# Patient Record
Sex: MALE | Race: WHITE | NOT HISPANIC OR LATINO | Employment: OTHER | URBAN - METROPOLITAN AREA
[De-identification: names, ages, dates, MRNs, and addresses within clinical notes are randomized per-mention and may not be internally consistent; named-entity substitution may affect disease eponyms.]

---

## 2018-01-15 ENCOUNTER — OFFICE VISIT (OUTPATIENT)
Dept: ORTHOPEDICS | Facility: CLINIC | Age: 63
End: 2018-01-15
Payer: COMMERCIAL

## 2018-01-15 VITALS — RESPIRATION RATE: 16 BRPM | DIASTOLIC BLOOD PRESSURE: 89 MMHG | SYSTOLIC BLOOD PRESSURE: 142 MMHG | HEART RATE: 83 BPM

## 2018-01-15 DIAGNOSIS — M25.531 ARTHRALGIA OF RIGHT FOREARM: Primary | ICD-10-CM

## 2018-01-15 NOTE — LETTER
1/15/2018      RE: Aníbal Goldstein  [address] [facility] [address]        Subjective:   Aníbal Goldstein is a 62 year old male who is c/o right wrist pain and numbness into whole hand. Overuse 3-4 weeks ago. He drives trucks for a living. Hx of carpal tunnel release in left wrist about 3 years ago. Has tried using braces but this is not fully effective. Sachin lifts heavy objects as part of his job and he has had difficulty lifting large bulky object due to the R hand symptoms.  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  He  has a past surgical history that includes hernia repair, inguinal rt/lt; KNEE SCOPE,SINGLE MENISECTOMY (2/9/2009); and rotator cuff repair rt/lt (02/18/10).  His family history includes DIABETES in his father.  He reports that he has never smoked. He has never used smokeless tobacco. He reports that he drinks about 1.0 oz of alcohol per week  He reports that he does not use illicit drugs.      ALLERGIES: He is allergic to no known allergies.    CURRENT MEDICATIONS: He currently has no medications in their medication list.     REVIEW OF SYSTEMS: 3 point review of systems is negative except as noted above.     Exam:   /89 (BP Location: Right arm, Patient Position: Sitting, Cuff Size: Adult Large)  Pulse 83  Resp 16           CONSTITUTIONAL: healthy, alert and no distress  HEAD: Normocephalic. No masses,  lesions, tenderness or abnormalities  SKIN: no suspicious lesions or rashes  GAIT: normal  NEUROLOGIC: Non-focal  PSYCHIATRIC: affect normal/bright and mentation appears normal.    MUSCULOSKELETAL:   R wrist, no wasting  AROM is full, perhaps subjective decrease in  strength  + tinnels and + phalens with numbness to the volar thumb and index finger    No paracervical tenderess AROM full with neg spurlings.    L elbow no swelling AROM without deficit  Tender lateral epicondyle and worse with ext.     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    Driss Chu MD CAQ

## 2018-01-15 NOTE — MR AVS SNAPSHOT
After Visit Summary   1/15/2018    Aníbal Goldstein    MRN: [id number]           Patient Information     Date Of Birth          1955        Visit Information        Provider Department      1/15/2018 6:15 PM Driss Chu MD [facility]        Today's Diagnoses     Arthralgia of right forearm    -  1       Follow-ups after your visit        Additional Services     ORTHOPEDICS ADULT REFERRAL       Your provider has referred you to: ortho hand to discuss median neuropathy post release  Please be aware that coverage of these services is subject to the terms and limitations of your health insurance plan.  Call member services at your health plan with any benefit or coverage questions.      Please bring the following to your appointment:    >>   Any x-rays, CTs or MRIs which have been performed.  Contact the facility where they were done to arrange for  prior to your scheduled appointment.    >>   List of current medications   >>   This referral request   >>   Any documents/labs given to you for this referral                  Future tests that were ordered for you today     Open Future Orders        Priority Expected Expires Ordered    EMG ([facility]) Routine  2/14/2018 1/15/2018            Who to contact     Please call your clinic at [phone] to:    Ask questions about your health    Make or cancel appointments    Discuss your medicines    Learn about your test results    Speak to your doctor   If you have compliments or concerns about an experience at your clinic, or if you wish to file a complaint, please contact [facility] Patient Relations at [phone] or email us at [email or web address]         Additional Information About Your Visit        MyChart Information     [employer] is an electronic gateway that provides easy, online access to your medical records. With [employer], you can request a clinic appointment, read your  test results, renew a prescription or communicate with your care team.     To sign up for [employer] visit the website at [email or web address]   You will be asked to enter the access code listed below, as well as some personal information. Please follow the directions to create your username and password.     Your access code is: [email or web address]  Expires: 2018  6:30 AM     Your access code will  in 90 days. If you need help or a new code, please contact your [facility] or call [phone] for assistance.        Care EveryWhere ID     This is your Care EveryWhere ID. This could be used by other organizations to access your [place] medical records  [id number]        Your Vitals Were     Pulse Respirations                83 16           Blood Pressure from Last 3 Encounters:   01/15/18 142/89   13 118/62   13 127/78    Weight from Last 3 Encounters:   13 196 lb (88.9 kg)   12 207 lb (93.9 kg)   01/10/12 207 lb (93.9 kg)              We Performed the Following     ORTHOPEDICS ADULT REFERRAL        Primary Care Provider Office Phone # Fax #    Morgan Omid Fierro [phone]        [facility] [address] [place] [id number]        Equal Access to Services     [facility]: Hadii aad ku hadasho Soomaali, waaxda luqadaha, qaybta kaalmada adeegyada, waxay idiin hayaan paul malik . So [facility] [phone].    ATENCIÓN: Si habla español, tiene a santos disposición servicios gratuitos de asistencia lingüística. Llame al [phone].    We comply with applicable federal civil rights laws and [place] laws. We do not discriminate on the basis of race, color, national origin, age, disability, sex, sexual orientation, or gender identity.            Thank you!     Thank you for choosing [facility]  for your care. Our goal is always to provide you with excellent care. Hearing back from our patients is one way we can  continue to improve our services. Please take a few minutes to complete the written survey that you may receive in the mail after your visit with us. Thank you!             Your Updated Medication List - Protect others around you: Learn how to safely use, store and throw away your medicines at www.disposemymeds.org.      Notice  As of 1/15/2018  7:09 PM    You have not been prescribed any medications.

## 2018-01-16 NOTE — PROGRESS NOTES
Subjective:   Aníbal Goldstein is a 62 year old male who is c/o right wrist pain and numbness into whole hand. Overuse 3-4 weeks ago. He drives trucks for a living. Hx of carpal tunnel release in left wrist about 3 years ago. Has tried using braces but this is not fully effective. Sachin lifts heavy objects as part of his job and he has had difficulty lifting large bulky object due to the R hand symptoms.  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  He  has a past surgical history that includes hernia repair, inguinal rt/lt; KNEE SCOPE,SINGLE MENISECTOMY (2/9/2009); and rotator cuff repair rt/lt (02/18/10).  His family history includes DIABETES in his father.  He reports that he has never smoked. He has never used smokeless tobacco. He reports that he drinks about 1.0 oz of alcohol per week  He reports that he does not use illicit drugs.      ALLERGIES: He is allergic to no known allergies.    CURRENT MEDICATIONS: He currently has no medications in their medication list.     REVIEW OF SYSTEMS: 3 point review of systems is negative except as noted above.     Exam:   /89 (BP Location: Right arm, Patient Position: Sitting, Cuff Size: Adult Large)  Pulse 83  Resp 16           CONSTITUTIONAL: healthy, alert and no distress  HEAD: Normocephalic. No masses, lesions, tenderness or abnormalities  SKIN: no suspicious lesions or rashes  GAIT:  normal  NEUROLOGIC: Non-focal  PSYCHIATRIC: affect normal/bright and mentation appears normal.    MUSCULOSKELETAL:   R wrist, no wasting  AROM is full, perhaps subjective decrease in  strength  + tinnels and + phalens with numbness to the volar thumb and index finger    No paracervical tenderess AROM full with neg spurlings.    L elbow no swelling AROM without deficit  Tender lateral epicondyle and worse with ext.     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    Driss Chu MD CAQ

## 2018-01-17 ENCOUNTER — TELEPHONE (OUTPATIENT)
Dept: ORTHOPEDICS | Facility: CLINIC | Age: 63
End: 2018-01-17

## 2018-01-17 NOTE — TELEPHONE ENCOUNTER
I was calling to let patient know there was a miscommunication in instructions I gave him the night prior. I told him previously to schedule with a hand surgeon here, however Dr. Chu recommends that he sees Dr. Delgado at the [facility] since it is closer to him. The pt verbalized understanding.

## 2018-01-22 ENCOUNTER — PRE VISIT (OUTPATIENT)
Dept: ORTHOPEDICS | Facility: CLINIC | Age: 63
End: 2018-01-22

## 2018-01-22 DIAGNOSIS — M79.641 RIGHT HAND PAIN: Primary | ICD-10-CM

## 2018-01-22 NOTE — TELEPHONE ENCOUNTER
Pt reports being seen for : Right wrist pain and numbness.  1. Do you have recent xrays (if not seen in EPIC)? No -  Patient informed that they will have x-rays done before appointment and to arrive at least 30 minutes before MD appointment. Xrays ordered per standing orders.  2. Do you have any MRI's (if not seen in EPIC)?No.   3. Have you had surgery in the past for the same issue?Had left side done in 2013  4. Are you being referred by another provider? Yes: Dr. Chu  If yes-Records in Epic.  5. Is this work comp or MVA related? No  No EMG.    Left message for patient to call back for previsit  Melisa Thornton RN

## 2018-01-25 ENCOUNTER — RADIANT APPOINTMENT (OUTPATIENT)
Dept: GENERAL RADIOLOGY | Facility: OTHER | Age: 63
End: 2018-01-25
Attending: ORTHOPAEDIC SURGERY
Payer: COMMERCIAL

## 2018-01-25 DIAGNOSIS — M79.641 RIGHT HAND PAIN: ICD-10-CM

## 2018-01-25 PROCEDURE — 73130 X-RAY EXAM OF HAND: CPT | Mod: RT

## 2018-02-13 NOTE — TELEPHONE ENCOUNTER
Pt had left side done about 3 years ago with [facility]. No complications. Left elbow pain is more like tennis elbow, no numbness or tingling. Only wanting to be seen for right hand/wrist.    Tiago Cline RN

## 2018-02-19 ENCOUNTER — TELEPHONE (OUTPATIENT)
Dept: ORTHOPEDICS | Facility: CLINIC | Age: 63
End: 2018-02-19

## 2018-02-19 ENCOUNTER — OFFICE VISIT (OUTPATIENT)
Dept: ORTHOPEDICS | Facility: CLINIC | Age: 63
End: 2018-02-19
Attending: FAMILY MEDICINE
Payer: COMMERCIAL

## 2018-02-19 VITALS
BODY MASS INDEX: 33.27 KG/M2 | SYSTOLIC BLOOD PRESSURE: 132 MMHG | HEIGHT: 67 IN | WEIGHT: 212 LBS | DIASTOLIC BLOOD PRESSURE: 74 MMHG | OXYGEN SATURATION: 97 % | HEART RATE: 83 BPM

## 2018-02-19 DIAGNOSIS — G56.01 CARPAL TUNNEL SYNDROME OF RIGHT WRIST: Primary | ICD-10-CM

## 2018-02-19 PROCEDURE — 99214 OFFICE O/P EST MOD 30 MIN: CPT | Performed by: ORTHOPAEDIC SURGERY

## 2018-02-19 ASSESSMENT — PAIN SCALES - GENERAL: PAINLEVEL: MILD PAIN (2)

## 2018-02-19 NOTE — MR AVS SNAPSHOT
After Visit Summary   2/19/2018    Aníbal Goldstein    MRN: [id number]           Patient Information     Date Of Birth          1955        Visit Information        Provider Department      2/19/2018 3:30 PM Britney Delgado MD [facility]        Care Instructions      [facility]  [address] [place] [address]  Phone [phone]  Fax [phone]    SURGICAL INFORMATION & INSTRUCTIONS  Dr. Britney Delgado  Name of Surgery: Right open carpal tunnel release    Date of Surgery:     A surgery scheduler will contact you within 1 week of your office visit with the surgeon.  If you don't receive a phone call, please call [phone].    Arrival Time:     Time of Surgery:      Please arrive early so that we can prepare you for surgery, if you arrive later than your scheduled arrival time your surgery may be cancelled.  Please note that scheduled times may change.      Location of Surgery:     ? [address] [address]  2nd floor check-in  Phone [phone]  Fax [phone]  [email or web address]    Prior to surgery    ? Call your insurance company  Ask if you need pre-approval for your surgery.  If pre-approval is needed, please call our surgery scheduler for assistance with the pre-approval process.   If you do not have insurance, please let us know.     ? Pre-Op Phone Call  You will receive a pre-op phone call 1-3 days before surgery to review your eating and drinking restrictions, review medications, and confirm surgery times.      ? 7-10 days BEFORE surgery  ? Stop taking aspirin, Plavix or aspirin products 10 days before surgery or as directed by your doctor.  ? Stop taking non-steroidal anti-inflammatory medications (naproxen/Aleve, ibuprofen/Advil/Motrin, celecoxib/Celebrex, meloxicam/Mobic) 1 week before surgery or as directed by your surgeon.  This will reduce the risk of bleeding during  surgery.  ? Stop taking fish oil (Omega-3-fatty acid) 1 week before surgery.  ? It is OK to take acetaminophen (Tylenol) up until 2 hours prior to surgery.  ? Take prescription medications as directed by your doctor. Discuss which medications to take or hold prior to your surgery, with your primary care doctor.  ? If you have diabetes, ask your primary care doctor or endocrinologist how you should take your medication(s).    ? 24 hours BEFORE surgery  Stop drinking alcohol (beer, wine, liquor) at least 24-hours before and after your surgery.     ? Evening BEFORE surgery  - Take a shower - to help wash away bacteria (germs) from your skin.  It s normal to have bacteria on your skin and skin protects us from these germs.  When you have surgery, we cut the skin.  Sometimes germs get into the cuts and cause infection (illness caused by germs).  By following the showering instructions and using the special soap, you will lower the number of germs on your skin.  This decreases your chance of an infection.    - Buy or get 8 ounces of antiseptic surgical soap called 4% CHG.  Common brands of this soap are Hibiclens and Exidine.    - You can find it this soap at your local pharmacy, clinic or retail store.  If you have trouble finding it, ask your pharmacist to help you find the right substitute.    - Do not shave within 12 inches of your incision (surgical cut) area for at least 3 days before surgery.  Shaving can make small cuts in the skin. This puts you at a higher risk of infection.    Items you will need for each shower:   - Newly washed towel  - 4 ounces of one of the recommended soaps    Follow these instructions, the evening before surgery  - Wash your hair and body with your regular shampoo and soap. Make sure you rinse the shampoo and soap from your hair and body.  - Using clean hands, apply about 2 ounces of soap gently on your skin from the neck to your toes. Use on your groin area last. Do not use this soap on  your face or head. If you get any soap in your eyes, ears or mouth, rinse right away.  - Once the soap has been on your skin for at least 1 minute, rinse off completely and repeat washing with the surgical soap one more time.  - Rinse well and dry off using a clean towel.  If you feel any tingling, itching or other irritation, rinse right away. It is normal to feel some coolness on the skin after using the antiseptic soap. Your skin may feel a bit dry after the shower, but do not use any lotions, creams or moisturizers. Do not use hair spray or other products in your hair.  - Dress in freshly washed clothes or pajamas. Use fresh pillowcases and sheets on your bed.    ? Day of Surgery  - Take another shower          Follow these instructions:      - Wash your hair and body with your regular shampoo and soap. Make sure you rinse the shampoo and soap from your hair and body.  - Using clean hands, apply about 2 ounces of soap gently on your skin from the neck to your toes. Use on your groin area last. Do not use this soap on your face or head. If you get any soap in your eyes, ears or mouth, rinse right away.  - Once the soap has been on your skin for at least 1 minute, rinse off completely and repeat washing with the surgical soap one more time.  - Rinse well and dry off using a clean towel.  If you feel any tingling, itching or other irritation, rinse right away. It is normal to feel some coolness on the skin after using the antiseptic soap. Your skin may feel a bit dry after the shower, but do not use any lotions, creams or moisturizers. Do not use hair spray or other products in your hair.  - Dress in freshly washed clothes.  - Do not wear deodorant, cologne, lotion, makeup, nail polish or jewelry to surgery.    ? If there is any change in your health PRIOR to your surgery, please contact your surgeon's office.  Such as a fever, body aches, fatigue, any flu-like symptoms, rash, or any new injury to related body  part.    ? Arrange for someone to drive you home after surgery.    will need to be a responsible adult (18 years or older) that will provide transportation to and from surgery and stay in the waiting room during your surgery. You may not drive yourself or take public transportation to and from surgery.    ? Arrange for someone to stay with you for 24 hours after you go home.   This person must be a responsible adult (18 years or older).    ? Bring these items to the hospital/surgery center:   ? Insurance card(s)  ? Money for parking and co-pays, if needed  ? A list of all the medications you take, including vitamins, minerals, herbs and over the counter medications.    ? A copy of your Advance Health Care Directive, if you have one.  This tells us what treatment(s) you would or would not want, and who would make health care decisions, if you could no longer speak for yourself.    ? A case for glasses, contact lenses, hearing aids or dentures.   ? Your inhaler or CPAP machine, if you use these at home    ? Leave extra cash, jewelry and other valuables at home.       ? Other information:   Sleep Apnea: Let your nurse know if you have a history of sleep apnea, only if you are having surgery at the [facility].    When you arrive for surgery  When you get to the surgery center/hospital, you will:  - Check in. If you are under age 18, you must be with a parent or legal guardian.  - Sign consent forms, if you haven t already. These forms state that you know the risks and benefits of surgery. When you sign the forms, you give us permission to do the surgery. Do not sign them unless you understand what will happen during and after your surgery. If you have any questions about your surgery, ask to speak with your doctor before you sign the forms. If you don t understand the answers, ask again.  - Receive a copy of the Patient s Bill of Rights. If you do not receive a copy, please ask for one.  - Change  into hospital clothes. Your belongings will be placed in a bag. We will return them to you after surgery.  - Meet with the anesthesia provider. He or she will tell you what kind of anesthesia (medicine) will be used to keep you comfortable during surgery.  - Remember: it s okay to remind doctors and nurses to wash their hands before touching you.  - In most cases, your surgeon will use a marker to write his or her initials on the surgery site. This ensures that the exact site is operated on.  - For safety reasons, we will ask you the same questions many times. For example, we may ask your name and birth date over and over again.  - Friends and family can stay with you until it s time for surgery. While you re in surgery, they will be in the waiting area. Please note that cell phones are not allowed in some patient care areas.  - If you have questions about what will happen in the operating room, talk to your care team.  - You will meet with an anesthesiologist, before your surgery.  He or she may reference types of anesthesia commonly used for surgeries:   o General:  This involves the use of an IV for injection of medication and anesthesia. You are put into a sleep and have a breathing tube to assist you with breathing.  o Sedation:  You are asleep, but not so deply that you need a breathing tube.   o Local or Regional: a nerve is injected to numb the surgical area.  o Spinal: you are numbed from the waist down with medicine injected into your back.  o Femoral Nerve Block:  Anesthesia injected into the groin of leg which you are having surgery on.      After surgery  We will move you to a recovery room, where we will watch you closely. If you have any pain or discomfort, tell your nurse. He or she will try to make you comfortable.    - If you are staying overnight, we will move you to your hospital room after you are awake.  - If you are going home, we will move you to another room. Friends and family may be able  to join you. The length of time you spend in recovery depend on the type of medicine you received, your medical condition, the type of surgery you had, or your response to the anesthesia given during your procedure.  - When you are discharged from the recovery room, the nurses will review instructions with you and your caregiver.  - Please wash your hands every time you touch the wound or change bandages or dressings.  - Do not submerge the wound in water.  You may not use a bathtub or hot tub until the wound is closed. The wait time frame is generally 2-3 weeks, but any open area can be a source of incoming bacteria, so it is better to be on the safe side and avoid water submersion until your wound is fully healed.  Keep all dressings clean and dry.   - If you had surgery on your arm or leg, elevate it as much as possible to help reduce swelling.  - You may put ice on the surgical area for comfort, keeping ice on area for up to 20 minutes then off for 40 minutes.  You may do this the first few days after surgery to help reduce pain and swelling.    - Drink at least 8-10 glasses of liquid each day to help your body heal.  - Keep your lungs clear by coughing and taking deep breaths every couple hours.  This is especially important the first 48 hours after surgery.    - Notify your doctor if you have any of the following:   o Fever of 101 F or higher  o Numbness and/or tingling  o Increased pain, redness or swelling  o Drainage from wound  o Prolonged or uncontrolled bleeding  o Difficulty with movement    Follow-up Appointments, in Clinic  If you don't already have an appointment scheduled, please call to set up an appointment at [phone].      ? Nurse Only appointment (2 weeks post op)  ? Post-Op appointments with provider (6 weeks post op) as needed    Dealing with pain  A nurse will check your comfort level often during your stay. He or she will work with you to manage your pain.  It s expected that you will  have pain after surgery.  Our goal is to reduce or minimize pain by:   - Remember pain is real. There are many ways to control pain. We can help you decide what works best for you.  - Ask for pain medicine when you need it.  Don t try to  tough it out --this can make you feel worse. Always take your medicine as ordered.  - Medicine doesn t work the same for everyone. If your medicine isn t working, tell your nurse. There may be other medicines or treatments we can try.  - Medication Refills.  If you need refills on your pain medication, please call the clinic as soon as possible.  It may take 72-business hours to obtain a refill.  Refills must be picked up at check-in 2, [facility] or mailed to a pharmacy of your choice.    - It is expected that you will wean off the pain medications in a timely manner.   - Constipation is a common side effect of pain medication, decreased activity and anesthesia from surgery.  Take a stool softener as prescribed by your doctor at the time of discharge.  You may also use over the counter medications as needed.  Be sure to increase your fiber (fruits and vegetables) and your water intake.      Please call the clinic at [phone], if you experience any problems or have questions.  If you are having an emergency, always call [phone] or seek immediate evaluation at the Emergency Room.    Thank you for selecting the [facility] for your care!  ---------------------------------------------          Follow-ups after your visit        Who to contact     If you have questions or need follow up information about today's clinic visit or your schedule please contact [facility] directly at [phone].  Normal or non-critical lab and imaging results will be communicated to you by MyChart, letter or phone within 4 business days after the clinic has received the results. If you do not hear from us within 7 days, please contact the clinic  "through [address] or phone. If you have a critical or abnormal lab result, we will notify you by phone as soon as possible.  Submit refill requests through [address] or call your pharmacy and they will forward the refill request to us. Please allow 3 business days for your refill to be completed.          Additional Information About Your Visit        [address] Information     [address] is an electronic gateway that provides easy, online access to your medical records. With [address], you can request a clinic appointment, read your test results, renew a prescription or communicate with your care team.     To sign up for [address] visit the website at [email or web address]   You will be asked to enter the access code listed below, as well as some personal information. Please follow the directions to create your username and password.     Your access code is: [email or web address]  Expires: 2018  6:30 AM     Your access code will  in 90 days. If you need help or a new code, please contact your [facility] or call [phone] for assistance.        Care EveryWhere ID     This is your Care EveryWhere ID. This could be used by other organizations to access your [place] medical records  [id number]        Your Vitals Were     Pulse Height Pulse Oximetry BMI (Body Mass Index)          83 1.702 m (5' 7\") 97% 33.2 kg/m2         Blood Pressure from Last 3 Encounters:   18 132/74   01/15/18 142/89   13 118/62    Weight from Last 3 Encounters:   18 96.2 kg (212 lb)   13 88.9 kg (196 lb)   12 93.9 kg (207 lb)              Today, you had the following     No orders found for display       Primary Care Provider Office Phone # Fax #    Morgan Omid Fierro [phone]       [address] [place] [address]        Equal Access to Services     EARNEST AMES AH: Hadii jovany carsono Sorita, waaxda luqadaha, qaybta kaalmada adeegyada, isra mercado " derek lojaboboyo ayersasadsteven bar. So [facility] [phone].    ATENCIÓN: Si habla español, tiene a santos disposición servicios gratuitos de asistencia lingüística. Barbraame al [phone].    We comply with applicable federal civil rights laws and [place] laws. We do not discriminate on the basis of race, color, national origin, age, disability, sex, sexual orientation, or gender identity.            Thank you!     Thank you for choosing [facility]  for your care. Our goal is always to provide you with excellent care. Hearing back from our patients is one way we can continue to improve our services. Please take a few minutes to complete the written survey that you may receive in the mail after your visit with us. Thank you!             Your Updated Medication List - Protect others around you: Learn how to safely use, store and throw away your medicines at www.disposemymeds.org.      Notice  As of 2/19/2018  4:02 PM    You have not been prescribed any medications.

## 2018-02-19 NOTE — PROGRESS NOTES
Date of Service: Feb 19, 2018    Chief Complaint:   Chief Complaint   Patient presents with     Consult     Right wrist pain and numbness     History of Present Illness: Aníbal Goldstein is a 62 year old, right handed male who presents today for further evaluation of possible right sided carpal tunnel syndrome. The patient has a history of left sided carpal tunnel release approximately three years ago at [facility] after experiencing intermittent hand numbness. He states he has been experiencing similar symptoms of hand numbness on the right side that acutely worsened after he was chopping wood approximately one month ago. His numbness is present during the day.  He has been using a splint and specifically wearing it at night with some relief. He notes his symptoms are worst when doing heavy lifting or securing straps on his truck.    Review of Systems: A 14-point review of systems was obtained on the intake form and scanned into the medical record. Review of systems is negative.    Past Medical History:  Past Medical History:   Diagnosis Date     Cervicalgia 1/2008     Pain in joint, shoulder region 1/2008     Past Surgical History:  Past Surgical History:   Procedure Laterality Date     HC KNEE SCOPE,MED/LAT MENISECTOMY  2/9/2009    Right knee     HERNIA REPAIR, INGUINAL RT/LT      lt ?     ROTATOR CUFF REPAIR RT/LT  02/18/10    Right side. Done at [facility].   Previous left open carpal tunnel release 15 years ago.    MEDICATIONS:  No current outpatient prescriptions on file.    ALLERGIES:  Allergies   Allergen Reactions     No Known Allergies      Social History:  Patient lives with his wife.  Works as .  Negative tobacco use.  Occasional alcohol use.      Family History:  Family History   Problem Relation Age of Onset     DIABETES Father      Adult onset   Negative for bleeding or clotting disorders or adverse reactions to anesthesia.    Physical examination:  Blood pressure 132/74, pulse 83,  "height 1.702 m (5' 7\"), weight 96.2 kg (212 lb), SpO2 97 %.  Pain is rated 2 out of 10 on the visual analog scale.  QUICKDASH: 65.91   Strength: The patient's  strength at 3 levels is (55,70,50) pounds on the right, versus (75,85,60) pounds on the left.   GENERAL: Healthy-appearing adult male in no acute distress.  Alert and oriented times three.  HEENT: Head normocephalic and atraumatic.  Extra-ocular movements intact.  Neck: Full range of motion without pain.  Respiratory: Breathing regular and non-labored.  Right upper extremity: Full shoulder, elbow, forearm, and wrist range of motion. Positive for Tinel's at the wrist. Negative for carpal compression. Negative for Phalen's test. Two point discrimination is 6 mm in the small finger and 5 mm in all other digits. 5/5 finger abduction, EPL, and FPL. Patient's neurovascular exam is intact and all digits are warm and well perfused with capillary refill in less than 2 seconds. No thenar atrophy.  Skin: Intact without any rashes or abrasions.    Radiographs: Three views of the right hand were available for review, dated 1/25/18.  These demonstrated degenerative changes at the DIP joints of the index and small fingers.  No acute osseous abnormalities.    Assessment: 62 year old, right handed male with right sided carpal tunnel syndrome.    Plan: Mr. Goldstein and I discussed his ongoing hand numbness which I believe is secondary to carpal tunnel syndrome. I discussed non-operative and operative treatment options with the patient. Specifically, we discussed possibly attaining a nerve study, but I don't think it is necessary to confirm the carpal tunnel diagnosis. I have described the procedure, post-operative protocol, and expected outcomes. I also discussed the risks of surgery which include, but are not limited to, bleeding, infection, nerve or vessel damage, wound healing problems, persistent pain, persistet numbness, and the possibility for further surgery.  " Anesthetic risks are rare but include an adverse reaction to local anesthesia.  After a full discussion of risks, benefits, and alternatives to surgery, the patient has elected to proceed.  We will look for a mutually convenient date to schedule. He is agreeable to the plan and all of his questions were answered at this time.    I, Britney Delgado MD, have reviewed the above note and agree with the scribe's notation as written.   I, Kei Elkins, am serving as a scribe to document services personally performed by Britney Delgado MD, based upon my observations and the provider's statements to me. All documentation has been reviewed by the aforementioned doctor prior to being entered into the official medical record.

## 2018-02-19 NOTE — NURSING NOTE
"Aníbal Goldstein's goals for this visit include: Evaluate right hand CT  He requests these members of his care team be copied on today's visit information: yes    PCP: Morgan Fierro    Referring Provider:  Driss Chu MD  [address] [place] [address]    Chief Complaint   Patient presents with     Consult     Right wrist pain and numbness       Initial /74  Pulse 83  Ht 1.702 m (5' 7\")  Wt 96.2 kg (212 lb)  SpO2 97%  BMI 33.2 kg/m2 Estimated body mass index is 33.2 kg/(m^2) as calculated from the following:    Height as of this encounter: 1.702 m (5' 7\").    Weight as of this encounter: 96.2 kg (212 lb).  Medication Reconciliation: complete     Hand Evaluation    Pain Score (1-10): Mild Pain (2) (Right wrist )  Hand Dominance: Right  QuickDASH Disability Score: 65.91              Force:   R hand  level 1 force: 24.9 kg (55 lb)  R hand  level 3 force: 31.8 kg (70 lb)  R hand  level 5 force: 22.7 kg (50 lb)  L hand   level 1 force: 34 kg (75 lb)  L hand  level 3 force: 38.6 kg (85 lb)  L hand  level 5 force: 27.2 kg (60 lb)        "

## 2018-02-19 NOTE — PATIENT INSTRUCTIONS
[facility]  [address] [place] [address]  Phone [phone]  Fax [phone]    SURGICAL INFORMATION & INSTRUCTIONS  Dr. Britney Delgado  Name of Surgery: Right open carpal tunnel release    Date of Surgery:     A surgery scheduler will contact you within 1 week of your office visit with the surgeon.  If you don't receive a phone call, please call [phone].    Arrival Time:     Time of Surgery:      Please arrive early so that we can prepare you for surgery, if you arrive later than your scheduled arrival time your surgery may be cancelled.  Please note that scheduled times may change.      Location of Surgery:     ? [facility]  [address] [place] [address]  2nd floor check-in  Phone [phone]  Fax [phone]  [email or web address]    Prior to surgery    ? Call your insurance company  Ask if you need pre-approval for your surgery.  If pre-approval is needed, please call our surgery scheduler for assistance with the pre-approval process.   If you do not have insurance, please let us know.     ? Pre-Op Phone Call  You will receive a pre-op phone call 1-3 days before surgery to review your eating and drinking restrictions, review medications, and confirm surgery times.      ? 7-10 days BEFORE surgery  ? Stop taking aspirin, Plavix or aspirin products 10 days before surgery or as directed by your doctor.  ? Stop taking non-steroidal anti-inflammatory medications (naproxen/Aleve, ibuprofen/Advil/Motrin, celecoxib/Celebrex, meloxicam/Mobic) 1 week before surgery or as directed by your surgeon.  This will reduce the risk of bleeding during surgery.  ? Stop taking fish oil (Omega-3-fatty acid) 1 week before surgery.  ? It is OK to take acetaminophen (Tylenol) up until 2 hours prior to surgery.  ? Take prescription medications as directed by your doctor. Discuss which medications to take or hold prior to your surgery, with your primary care  doctor.  ? If you have diabetes, ask your primary care doctor or endocrinologist how you should take your medication(s).    ? 24 hours BEFORE surgery  Stop drinking alcohol (beer, wine, liquor) at least 24-hours before and after your surgery.     ? Evening BEFORE surgery  - Take a shower - to help wash away bacteria (germs) from your skin.  It s normal to have bacteria on your skin and skin protects us from these germs.  When you have surgery, we cut the skin.  Sometimes germs get into the cuts and cause infection (illness caused by germs).  By following the showering instructions and using the special soap, you will lower the number of germs on your skin.  This decreases your chance of an infection.    - Buy or get 8 ounces of antiseptic surgical soap called 4% CHG.  Common brands of this soap are Hibiclens and Exidine.    - You can find it this soap at your local pharmacy, clinic or retail store.  If you have trouble finding it, ask your pharmacist to help you find the right substitute.    - Do not shave within 12 inches of your incision (surgical cut) area for at least 3 days before surgery.  Shaving can make small cuts in the skin. This puts you at a higher risk of infection.    Items you will need for each shower:   - Newly washed towel  - 4 ounces of one of the recommended soaps    Follow these instructions, the evening before surgery  - Wash your hair and body with your regular shampoo and soap. Make sure you rinse the shampoo and soap from your hair and body.  - Using clean hands, apply about 2 ounces of soap gently on your skin from the neck to your toes. Use on your groin area last. Do not use this soap on your face or head. If you get any soap in your eyes, ears or mouth, rinse right away.  - Once the soap has been on your skin for at least 1 minute, rinse off completely and repeat washing with the surgical soap one more time.  - Rinse well and dry off using a clean towel.  If you feel any tingling,  itching or other irritation, rinse right away. It is normal to feel some coolness on the skin after using the antiseptic soap. Your skin may feel a bit dry after the shower, but do not use any lotions, creams or moisturizers. Do not use hair spray or other products in your hair.  - Dress in freshly washed clothes or pajamas. Use fresh pillowcases and sheets on your bed.    ? Day of Surgery  - Take another shower          Follow these instructions:      - Wash your hair and body with your regular shampoo and soap. Make sure you rinse the shampoo and soap from your hair and body.  - Using clean hands, apply about 2 ounces of soap gently on your skin from the neck to your toes. Use on your groin area last. Do not use this soap on your face or head. If you get any soap in your eyes, ears or mouth, rinse right away.  - Once the soap has been on your skin for at least 1 minute, rinse off completely and repeat washing with the surgical soap one more time.  - Rinse well and dry off using a clean towel.  If you feel any tingling, itching or other irritation, rinse right away. It is normal to feel some coolness on the skin after using the antiseptic soap. Your skin may feel a bit dry after the shower, but do not use any lotions, creams or moisturizers. Do not use hair spray or other products in your hair.  - Dress in freshly washed clothes.  - Do not wear deodorant, cologne, lotion, makeup, nail polish or jewelry to surgery.    ? If there is any change in your health PRIOR to your surgery, please contact your surgeon's office.  Such as a fever, body aches, fatigue, any flu-like symptoms, rash, or any new injury to related body part.    ? Arrange for someone to drive you home after surgery.    will need to be a responsible adult (18 years or older) that will provide transportation to and from surgery and stay in the waiting room during your surgery. You may not drive yourself or take public transportation to and from  surgery.    ? Arrange for someone to stay with you for 24 hours after you go home.   This person must be a responsible adult (18 years or older).    ? Bring these items to the hospital/surgery center:   ? Insurance card(s)  ? Money for parking and co-pays, if needed  ? A list of all the medications you take, including vitamins, minerals, herbs and over the counter medications.    ? A copy of your Advance Health Care Directive, if you have one.  This tells us what treatment(s) you would or would not want, and who would make health care decisions, if you could no longer speak for yourself.    ? A case for glasses, contact lenses, hearing aids or dentures.   ? Your inhaler or CPAP machine, if you use these at home    ? Leave extra cash, jewelry and other valuables at home.       ? Other information:   Sleep Apnea: Let your nurse know if you have a history of sleep apnea, only if you are having surgery at the [facility].    When you arrive for surgery  When you get to the surgery center/hospital, you will:  - Check in. If you are under age 18, you must be with a parent or legal guardian.  - Sign consent forms, if you haven t already. These forms state that you know the risks and benefits of surgery. When you sign the forms, you give us permission to do the surgery. Do not sign them unless you understand what will happen during and after your surgery. If you have any questions about your surgery, ask to speak with your doctor before you sign the forms. If you don t understand the answers, ask again.  - Receive a copy of the Patient s Bill of Rights. If you do not receive a copy, please ask for one.  - Change into hospital clothes. Your belongings will be placed in a bag. We will return them to you after surgery.  - Meet with the anesthesia provider. He or she will tell you what kind of anesthesia (medicine) will be used to keep you comfortable during surgery.  - Remember: it s okay to remind doctors and  nurses to wash their hands before touching you.  - In most cases, your surgeon will use a marker to write his or her initials on the surgery site. This ensures that the exact site is operated on.  - For safety reasons, we will ask you the same questions many times. For example, we may ask your name and birth date over and over again.  - Friends and family can stay with you until it s time for surgery. While you re in surgery, they will be in the waiting area. Please note that cell phones are not allowed in some patient care areas.  - If you have questions about what will happen in the operating room, talk to your care team.  - You will meet with an anesthesiologist, before your surgery.  He or she may reference types of anesthesia commonly used for surgeries:   o General:  This involves the use of an IV for injection of medication and anesthesia. You are put into a sleep and have a breathing tube to assist you with breathing.  o Sedation:  You are asleep, but not so deply that you need a breathing tube.   o Local or Regional: a nerve is injected to numb the surgical area.  o Spinal: you are numbed from the waist down with medicine injected into your back.  o Femoral Nerve Block:  Anesthesia injected into the groin of leg which you are having surgery on.      After surgery  We will move you to a recovery room, where we will watch you closely. If you have any pain or discomfort, tell your nurse. He or she will try to make you comfortable.    - If you are staying overnight, we will move you to your hospital room after you are awake.  - If you are going home, we will move you to another room. Friends and family may be able to join you. The length of time you spend in recovery depend on the type of medicine you received, your medical condition, the type of surgery you had, or your response to the anesthesia given during your procedure.  - When you are discharged from the recovery room, the nurses will review instructions  with you and your caregiver.  - Please wash your hands every time you touch the wound or change bandages or dressings.  - Do not submerge the wound in water.  You may not use a bathtub or hot tub until the wound is closed. The wait time frame is generally 2-3 weeks, but any open area can be a source of incoming bacteria, so it is better to be on the safe side and avoid water submersion until your wound is fully healed.  Keep all dressings clean and dry.   - If you had surgery on your arm or leg, elevate it as much as possible to help reduce swelling.  - You may put ice on the surgical area for comfort, keeping ice on area for up to 20 minutes then off for 40 minutes.  You may do this the first few days after surgery to help reduce pain and swelling.    - Drink at least 8-10 glasses of liquid each day to help your body heal.  - Keep your lungs clear by coughing and taking deep breaths every couple hours.  This is especially important the first 48 hours after surgery.    - Notify your doctor if you have any of the following:   o Fever of 101 F or higher  o Numbness and/or tingling  o Increased pain, redness or swelling  o Drainage from wound  o Prolonged or uncontrolled bleeding  o Difficulty with movement    Follow-up Appointments, in Clinic  If you don't already have an appointment scheduled, please call to set up an appointment at [phone].      ? Nurse Only appointment (2 weeks post op)  ? Post-Op appointments with provider (6 weeks post op) as needed    Dealing with pain  A nurse will check your comfort level often during your stay. He or she will work with you to manage your pain.  It s expected that you will have pain after surgery.  Our goal is to reduce or minimize pain by:   - Remember pain is real. There are many ways to control pain. We can help you decide what works best for you.  - Ask for pain medicine when you need it.  Don t try to  tough it out --this can make you feel worse. Always take your  medicine as ordered.  - Medicine doesn t work the same for everyone. If your medicine isn t working, tell your nurse. There may be other medicines or treatments we can try.  - Medication Refills.  If you need refills on your pain medication, please call the clinic as soon as possible.  It may take 72-business hours to obtain a refill.  Refills must be picked up at check-in 2, [facility] or mailed to a pharmacy of your choice.    - It is expected that you will wean off the pain medications in a timely manner.   - Constipation is a common side effect of pain medication, decreased activity and anesthesia from surgery.  Take a stool softener as prescribed by your doctor at the time of discharge.  You may also use over the counter medications as needed.  Be sure to increase your fiber (fruits and vegetables) and your water intake.      Please call the clinic at [phone], if you experience any problems or have questions.  If you are having an emergency, always call 911 or seek immediate evaluation at the Emergency Room.    Thank you for selecting the [facility] for your care!  ---------------------------------------------

## 2018-02-19 NOTE — TELEPHONE ENCOUNTER
Procedure: Right open carpal tunnel release  Facility: [facility]  Length: 30 minute(s)  Surgeon: Sandy CLARK  Anesthesia: Local Only  BMI: There is no height or weight on file to calculate BMI. (If over 43 for general or 45 for MAC cannot be scheduled at [facility])   Pre-op Appointments needed: none  Post-op appointments needed: 2 weeks nurse only (on a Monday when Sandy is in clinic)   needed:  No  Surgery packet/instructions given to patient?  Yes     Tiago Cline, RN

## 2018-02-20 NOTE — TELEPHONE ENCOUNTER
Date Scheduled: 3/1/18  Facility: [facility]  Surgeon: MD Sandy   Post-op appointment scheduled: Yes (2 week nurse only)   scheduled?: Not Applicable  Surgery packet/instructions confirmed received?  Yes  Special Considerations: none  Tiago Cline RN

## 2018-03-01 ENCOUNTER — HOSPITAL ENCOUNTER (OUTPATIENT)
Facility: AMBULATORY SURGERY CENTER | Age: 63
Discharge: HOME | End: 2018-03-01
Attending: ORTHOPAEDIC SURGERY | Admitting: ORTHOPAEDIC SURGERY
Payer: COMMERCIAL

## 2018-03-01 VITALS
SYSTOLIC BLOOD PRESSURE: 135 MMHG | TEMPERATURE: 98.1 F | OXYGEN SATURATION: 96 % | HEART RATE: 84 BPM | DIASTOLIC BLOOD PRESSURE: 70 MMHG | RESPIRATION RATE: 16 BRPM

## 2018-03-01 DIAGNOSIS — G56.01 CARPAL TUNNEL SYNDROME OF RIGHT WRIST: Primary | ICD-10-CM

## 2018-03-01 PROCEDURE — 64721 CARPAL TUNNEL SURGERY: CPT | Mod: RT

## 2018-03-01 PROCEDURE — G8918 PT W/O PREOP ORDER IV AB PRO: HCPCS

## 2018-03-01 PROCEDURE — G8907 PT DOC NO EVENTS ON DISCHARG: HCPCS

## 2018-03-01 PROCEDURE — 64721 CARPAL TUNNEL SURGERY: CPT | Mod: RT | Performed by: ORTHOPAEDIC SURGERY

## 2018-03-01 RX ORDER — HYDROCODONE BITARTRATE AND ACETAMINOPHEN 5; 325 MG/1; MG/1
1-2 TABLET ORAL EVERY 4 HOURS PRN
Qty: 10 TABLET | Refills: 0 | Status: SHIPPED | OUTPATIENT
Start: 2018-03-01 | End: 2021-01-04

## 2018-03-01 NOTE — IP AVS SNAPSHOT
MRN:[id number]                      After Visit Summary   3/1/2018    Aníbal Goldstein    MRN: [id number]           Thank you!     Thank you for choosing [facility] for your care. Our goal is always to provide you with excellent care. Hearing back from our patients is one way we can continue to improve our services. Please take a few minutes to complete the written survey that you may receive in the mail after you visit with us. Thank you!        Patient Information     Date Of Birth          1955        About your hospital stay     You were admitted on:  March 1, 2018 You last received care in the:  [facility]    You were discharged on:  March 1, 2018       Who to Call     For medical emergencies, please call 911.  For non-urgent questions about your medical care, please call your primary care provider or clinic, [phone]  For questions related to your surgery, please call your surgery clinic        Attending Provider     Provider Britney Moise MD Orthopedics       Primary Care Provider Office Phone # Fax #    Morgan Omid Fierro [phone]      After Care Instructions      Diet as Tolerated       Return to diet before surgery, unless instructed otherwise.            Discharge Instructions       Review outpatient procedure discharge instructions with patient as directed by Provider            Dressing Change       Keep dressing clean, dry, and intact for five days.  Then ok to remove dressing, get incision wet in shower, and transition to removable wrist brace.  No soaking incision until sutures removed.            Ice to affected area       Ice pack to surgical site every 15 minutes per hour for 24 hours            Notify Provider       For signs and symptoms of infection: Fever greater than 101, redness, swelling, heat at site, drainage, pus.            Return to clinic       Return to clinic in 2 weeks            Weight bearing -  "Partial       No lifting more than a coffee cup.  Finger ROM encouraged beginning today.                  Your next 10 appointments already scheduled     Mar 12, 2018  4:00 PM CDT   Nurse Only with MG NURSE ONLY ORTHO   [facility]    [address] [place] [id number] [phone]              Pending Results     No orders found from 2018 to 3/2/2018.            Admission Information     Date & Time Provider Department Dept. Phone    3/1/2018 Britney Delgado MD [facility] [phone]      Your Vitals Were     Blood Pressure Pulse Temperature Respirations Pulse Oximetry       135/70 84 98.1  F (36.7  C) (Temporal) 16 96%       MyChart Information     [employer] lets you send messages to your doctor, view your test results, renew your prescriptions, schedule appointments and more. To sign up, go to [email or web address] . Click on \"Log in\" on the left side of the screen, which will take you to the Welcome page. Then click on \"Sign up Now\" on the right side of the page.     You will be asked to enter the access code listed below, as well as some personal information. Please follow the directions to create your username and password.     Your access code is: [email or web address]  Expires: 2018  6:30 AM     Your access code will  in 90 days. If you need help or a new code, please call your [facility] clinic or [phone].        Care EveryWhere ID     This is your Care EveryWhere ID. This could be used by other organizations to access your [facility] medical records  [id number]        Equal Access to Services     [facility]: Hadii jovany Rawls, wamateoda ciaran, qaybta kaalisra sanchez. So [facility] [phone].    ATENCIÓN: Si habla español, tiene a santos disposición servicios gratuitos de asistencia lingüística. Llame al [phone].    We comply with applicable federal civil " rights laws and [place] laws. We do not discriminate on the basis of race, color, national origin, age, disability, sex, sexual orientation, or gender identity.               Review of your medicines      START taking        Dose / Directions    HYDROcodone-acetaminophen 5-325 MG per tablet   Commonly known as:  NORCO   Used for:  Carpal tunnel syndrome of right wrist        Dose:  1-2 tablet   Take 1-2 tablets by mouth every 4 hours as needed for other (Moderate to Severe Pain)   Quantity:  10 tablet   Refills:  0            Where to get your medicines      Some of these will need a paper prescription and others can be bought over the counter. Ask your nurse if you have questions.     Bring a paper prescription for each of these medications     HYDROcodone-acetaminophen 5-325 MG per tablet                Protect others around you: Learn how to safely use, store and throw away your medicines at www.disposemymeds.org.        Information about OPIOIDS     PRESCRIPTION OPIOIDS: WHAT YOU NEED TO KNOW    Prescription opioids can be used to help relieve moderate to severe pain and are often prescribed following a surgery or injury, or for certain health conditions. These medications can be an important part of treatment but also come with serious risks. It is important to work with your health care provider to make sure you are getting the safest, most effective care.    WHAT ARE THE RISKS AND SIDE EFFECTS OF OPIOID USE?  Prescription opioids carry serious risks of addiction and overdose, especially with prolonged use. An opioid overdose, often marked by slowed breathing can cause sudden death. The use of prescription opioids can have a number of side effects as well, even when taken as directed:      Tolerance - meaning you might need to take more of a medication for the same pain relief    Physical dependence - meaning you have symptoms of withdrawal when a medication is stopped    Increased sensitivity to  pain    Constipation    Nausea, vomiting, and dry mouth    Sleepiness and dizziness    Confusion    Depression    Low levels of testosterone that can result in lower sex drive, energy, and strength    Itching and sweating    RISKS ARE GREATER WITH:    History of drug misuse, substance use disorder, or overdose    Mental health conditions (such as depression or anxiety)    Sleep apnea    Older age (65 years or older)    Pregnancy    Avoid alcohol while taking prescription opioids.   Also, unless specifically advised by your health care provider, medications to avoid include:    Benzodiazepines (such as Xanax or Valium)    Muscle relaxants (such as Soma or Flexeril)    Hypnotics (such as Ambien or Lunesta)    Other prescription opioids    KNOW YOUR OPTIONS:  Talk to your health care provider about ways to manage your pain that do not involve prescription opioids. Some of these options may actually work better and have fewer risks and side effects:    Pain relievers such as acetaminophen, ibuprofen, and naproxen    Some medications that are also used for depression or seizures    Physical therapy and exercise    Cognitive behavioral therapy, a psychological, goal-directed approach, in which patients learn how to modify physical, behavioral, and emotional triggers of pain and stress    IF YOU ARE PRESCRIBED OPIOIDS FOR PAIN:    Never take opioids in greater amounts or more often than prescribed    Follow up with your primary health care provider and work together to create a plan on how to manage your pain.    Talk about ways to help manage your pain that do not involve prescription opioids    Talk about all concerns and side effects    Help prevent misuse and abuse    Never sell or share prescription opioids    Never use another person's prescription opioids    Store prescription opioids in a secure place and out of reach of others (this may include visitors, children, friends, and family)    Visit  www.cdc.gov/drugoverdose to learn about risks of opioid abuse and overdose    If you believe you may be struggling with addiction, tell your health care provider and ask for guidance or call [facility]'s National Helpline at 8-003-894-HELP    LEARN MORE / www.cdc.gov/drugoverdose/prescribing/guideline.html    Safely dispose of unused prescription opioids: Find your local drug take-back programs and more information about the importance of safe disposal at www.doseofreality.mn.gov             Medication List: This is a list of all your medications and when to take them. Check marks below indicate your daily home schedule. Keep this list as a reference.      Medications           Morning Afternoon Evening Bedtime As Needed    HYDROcodone-acetaminophen 5-325 MG per tablet   Commonly known as:  NORCO   Take 1-2 tablets by mouth every 4 hours as needed for other (Moderate to Severe Pain)

## 2018-03-01 NOTE — BRIEF OP NOTE
Orthopedic Brief Operative Note    March 1, 2018    Pre-operative diagnosis: Carpal tunnel syndrome, right   Post-operative diagnosis: Same   Procedure: Right open carpal tunnel release   Surgeon: Britney Delgado   Assistant(s): None   Anesthesia: Local anesthesia   Estimated blood loss: 1cc   Total IV fluids: None   Drains: None   Specimens: None   Implants: None   Findings: Hyperemic nerve   Complications: None   Condition: Stable   Weight bearing status: Partial weight bearing (30 - 50%)   Activity: Activity as tolerated  Patient may move about with assist as indicated or with supervision   Plan: Elevate  Ice  Finger ROM  Splint x 4 days   Follow-up: 3/12/18    Britney Delgado MD  [phone]

## 2018-03-01 NOTE — IP AVS SNAPSHOT
[facility]    [address] [place] [address]    Phone:  [phone]                                       After Visit Summary   3/1/2018    Aníbal Goldstein    MRN: [id number]           After Visit Summary Signature Page     I have received my discharge instructions, and my questions have been answered. I have discussed any challenges I see with this plan with the nurse or doctor.    ..........................................................................................................................................  Patient/Patient Representative Signature      ..........................................................................................................................................  Patient Representative Print Name and Relationship to Patient    ..................................................               ................................................  Date                                            Time    ..........................................................................................................................................  Reviewed by Signature/Title    ...................................................              ..............................................  Date                                                            Time

## 2018-03-01 NOTE — OP NOTE
PREOPERATIVE DIAGNOSIS:  Right carpal tunnel syndrome.      POSTOPERATIVE DIAGNOSIS:  Right carpal tunnel syndrome.      PROCEDURE:  Right open carpal tunnel release.      ATTENDING SURGEON:  Britney Delgado MD      ASSISTANT:  None.      ANESTHESIA:  Local anesthesia only consisting of 6 mL of 0.5% Marcaine plain and 1% lidocaine plain in a 1:1 ratio.      TOURNIQUET TIME:  12 minutes.      ESTIMATED BLOOD LOSS:  1 mL.      SPECIMENS:  None.      DRAINS:  None.      IMPLANTS:  None.      COMPLICATIONS:  None apparent.      BRIEF HISTORY:  Aníbal Goldstein is a 62-year-old right-hand dominant male who presented with a history of numbness and tingling in a median nerve distribution.  He has undergone a trial of splinting and anti-inflammatories, but has had persistence of symptoms.  I had a discussion with the patient regarding open carpal tunnel release.  I described the procedure, post-operative protocol and expected outcomes.  We discussed the risks, benefits and alternatives to surgery.  Risks discussed include bleeding, infection, nerve or vessel damage, wound healing problems, persistent pain, persistent numbness, and the possibility for further surgery.  Anesthetic risks are rare but include an adverse systemic reaction to local anesthesia.  After a full discussion of risks, benefits, and alternatives to surgery, the patient elected to proceed and informed consent was obtained.    INTRAOPERATIVE FINDINGS: There was no hook of hamate fractures or retained lumbricals.  The median nerve was hyperemic.  The ligament was thickened.     DESCRIPTION OF PROCEDURE:  The patient was identified in the preoperative holding area.  The consent was reviewed and signed and the operative site was identified and marked.  The patient was brought to the operating room and transferred to the operating table in a supine position.  A tourniquet was applied to the operative forearm and the arm was placed onto an arm table.  A timeout  was performed, verifying the correct patient, procedure, site and side.  No preoperative prophylactic antibiotics were given.  The skin and subcutaneous tissue were injected with local anesthesia in line with the planned surgical incision.  The right arm was then prepped and draped in a standard sterile fashion.  A second timeout was performed, verifying the correct operative site.  An Esmarch was used to exsanguinate the limb and the tourniquet was inflated to 250 mmHg. A longitudinal incision was made in line with the radial aspect of the ring finger from the distal wrist flexion crease to James's line.  This was taken down sharply to the level of the palmar fascia.  The palmar fascia was incised in line with the skin incision.  Hemostasis was achieved.  The transverse fibers of the transverse carpal ligament were identified and released from their ulnar attachment in both a proximal and distal direction.  Distally, the release was completed until the palmar fat was present and there was greater than 1 cm diastasis of the flaps.  Proximally, the tenotomy scissors were used to bluntly dissect above and below the transverse carpal ligament and it was released under direct visualization into the distal forearm.  Palpation and visualization confirmed complete release of the ligament.  The carpal tunnel was then inspected with findings as listed above.  The tourniquet was deflated.  Hemostasis was achieved.  The wound was thoroughly irrigated with normal saline.  The skin was closed with interrupted 4-0 nylon.  A soft sterile dressing was applied followed by a well-padded volar resting splint.  The patient tolerated the procedure well and there were no immediate complications.  He was brought to the recovery room in stable condition.  All sponge and needle counts were correct at the end of the case.      POSTOPERATIVE PLAN:  The patient will be discharged to home today with oral pain medications.  The patient will  elevate and ice.  The splint should remain clean, dry, and intact for 5 days.  After that time, the splint may be removed and the incision may get wet in the shower. The removable wrist brace should be worn.  Finger range of motion is encouraged. The patient will follow-up with my nurse in 11 days for a wound check and suture removal.         KEAGAN ARROYO MD

## 2018-03-12 ENCOUNTER — ALLIED HEALTH/NURSE VISIT (OUTPATIENT)
Dept: NURSING | Facility: CLINIC | Age: 63
End: 2018-03-12
Payer: COMMERCIAL

## 2018-03-12 DIAGNOSIS — Z48.02 ENCOUNTER FOR REMOVAL OF SUTURES: ICD-10-CM

## 2018-03-12 DIAGNOSIS — G56.01 CARPAL TUNNEL SYNDROME OF RIGHT WRIST: Primary | ICD-10-CM

## 2018-03-12 PROCEDURE — 99024 POSTOP FOLLOW-UP VISIT: CPT

## 2018-03-12 NOTE — MR AVS SNAPSHOT
"              After Visit Summary   3/12/2018    Aníbal Goldstein    MRN: [id number]           Patient Information     Date Of Birth          1955        Visit Information        Provider Department      3/12/2018 4:00 PM MG NURSE ONLY [facility]        Today's Diagnoses     Carpal tunnel syndrome of right wrist    -  1    Encounter for removal of sutures          Care Instructions      Suture or Staple Removal  You were seen today for a suture or staple removal. Your wound is healing as expected. The wound has healed well enough that the sutures or staples can be removed. The wound will continue to heal for the next few months.  At this time there is no sign of infection.   Home care    If you have pain, take pain medicine as advised by your healthcare provider.     Keep your wound clean and protected by covering it with a bandage for the next week or so.     Wash your hands with soap and warm water before and after caring for your wound. This helps prevent infection.    Clean the wound gently with soap and warm water daily or as directed by your child s health care provider. Do not use iodine, alcohol, or other cleansers on the wound.  Gently pat it dry. Put on a new bandage, if needed. Do not reuse bandages.    If the area gets wet, gently pat it dry with a clean cloth. Replace the wet bandage with a dry one.    Check the wound daily for signs of infection. (These are listed under \"When to seek medical advice\" below.)    You may shower and bathe as usual. Swimming is now permitted.  Follow-up care  Follow up with your healthcare provider as advised.  When to seek medical advice   Call your healthcare provider if any of the following occur:    Wound reopens or bleeds    Signs of an infection, such as:    Increasing redness or swelling around the wound    Increased warmth from the wound    Worsening pain    Red streaking lines away from the wound    Fluid draining from the " wound    Fever of 100.4 F (38 C) or higher, or as directed by your child's healthcare provider  Date Last Reviewed: 2015-2017 [employer]. [address] [place] [address], [place] [address]. All rights reserved. This information is not intended as a substitute for professional medical care. Always follow your healthcare professional's instructions.    Thanks for coming today.  [facility]  [address] [place] [address]    To schedule future appointments in Ortho Clinic, you may call [phone].    To schedule ordered imaging by your provider:   Call Central Imaging [phone]    To schedule an injection ordered by your provider:  Call Central Imaging Injection scheduling line: [phone]  [employer] available online at:  [email or web address]    Please call if any further questions or concerns ([phone]).  Clinic hours 8 am to 5 pm.    Return to clinic (call) if symptoms worsen or fail to improve.              Follow-ups after your visit        Who to contact     If you have questions or need follow up information about today's clinic visit or your schedule please contact [facility] directly at [phone].  Normal or non-critical lab and imaging results will be communicated to you by Thoughtlyhart, letter or phone within 4 business days after the clinic has received the results. If you do not hear from us within 7 days, please contact the clinic through Thoughtlyhart or phone. If you have a critical or abnormal lab result, we will notify you by phone as soon as possible.  Submit refill requests through Goojitsu or call your pharmacy and they will forward the refill request to us. Please allow 3 business days for your refill to be completed.          Additional Information About Your Visit        Goojitsu Information     Goojitsu is an electronic gateway that provides easy, online access to your medical records. With Goojitsu, you can request  a clinic appointment, read your test results, renew a prescription or communicate with your care team.     To sign up for FlxOnehart visit the website at [email or web address]   You will be asked to enter the access code listed below, as well as some personal information. Please follow the directions to create your username and password.     Your access code is: [email or web address]  Expires: 2018  7:30 AM     Your access code will  in 90 days. If you need help or a new code, please contact your [facility] or call [phone] for assistance.        Care EveryWhere ID     This is your Care EveryWhere ID. This could be used by other organizations to access your [facility] medical records  [id number]         Blood Pressure from Last 3 Encounters:   18 135/70   18 132/74   01/15/18 142/89    Weight from Last 3 Encounters:   18 96.2 kg (212 lb)   13 88.9 kg (196 lb)   12 93.9 kg (207 lb)              Today, you had the following     No orders found for display       Primary Care Provider Office Phone # Fax #    Morgan Omid Fierro [phone]       [address] [place]            [place] [address]        Equal Access to Services     EARNEST AMES AH: Hadii aad ku hadasho Soomaali, waaxda luqadaha, qaybta kaalmada adeegyada, waxay idiin hayaan paul malik . So [facility] [phone].    ATENCIÓN: Si habla español, tiene a santos disposición servicios gratuitos de asistencia lingüística. Llame al [phone].    We comply with applicable federal civil rights laws and [place] laws. We do not discriminate on the basis of race, color, national origin, age, disability, sex, sexual orientation, or gender identity.            Thank you!     Thank you for choosing [facility]  for your care. Our goal is always to provide you with excellent care. Hearing back from our patients is one way we can continue to improve our services.  Please take a few minutes to complete the written survey that you may receive in the mail after your visit with us. Thank you!             Your Updated Medication List - Protect others around you: Learn how to safely use, store and throw away your medicines at www.disposemymeds.org.          This list is accurate as of 3/12/18 11:59 PM.  Always use your most recent med list.                   Brand Name Dispense Instructions for use Diagnosis    HYDROcodone-acetaminophen 5-325 MG per tablet    NORCO    10 tablet    Take 1-2 tablets by mouth every 4 hours as needed for other (Moderate to Severe Pain)    Carpal tunnel syndrome of right wrist

## 2018-03-13 NOTE — PATIENT INSTRUCTIONS
"  Suture or Staple Removal  You were seen today for a suture or staple removal. Your wound is healing as expected. The wound has healed well enough that the sutures or staples can be removed. The wound will continue to heal for the next few months.  At this time there is no sign of infection.   Home care    If you have pain, take pain medicine as advised by your healthcare provider.     Keep your wound clean and protected by covering it with a bandage for the next week or so.     Wash your hands with soap and warm water before and after caring for your wound. This helps prevent infection.    Clean the wound gently with soap and warm water daily or as directed by your child s health care provider. Do not use iodine, alcohol, or other cleansers on the wound.  Gently pat it dry. Put on a new bandage, if needed. Do not reuse bandages.    If the area gets wet, gently pat it dry with a clean cloth. Replace the wet bandage with a dry one.    Check the wound daily for signs of infection. (These are listed under \"When to seek medical advice\" below.)    You may shower and bathe as usual. Swimming is now permitted.  Follow-up care  Follow up with your healthcare provider as advised.  When to seek medical advice   Call your healthcare provider if any of the following occur:    Wound reopens or bleeds    Signs of an infection, such as:    Increasing redness or swelling around the wound    Increased warmth from the wound    Worsening pain    Red streaking lines away from the wound    Fluid draining from the wound    Fever of 100.4 F (38 C) or higher, or as directed by your child's healthcare provider  Date Last Reviewed: 9/27/2015 2000-2017 [employer]. [address] [place] [address], [place] [address]. All rights reserved. This information is not intended as a substitute for professional medical care. Always follow your healthcare professional's instructions.    Thanks for coming today.  Ortho/Sports Medicine " [address] [address]    To schedule future appointments in Ortho Clinic, you may call [phone].    To schedule ordered imaging by your provider:   Call Central Imaging [phone]    To schedule an injection ordered by your provider:  Call Central Imaging Injection scheduling line: [phone]  MyChart available online at:  [email or web address]    Please call if any further questions or concerns ([phone]).  Clinic hours 8 am to 5 pm.    Return to clinic (call) if symptoms worsen or fail to improve.

## 2018-03-13 NOTE — PROGRESS NOTES
Aníbal DAVIES Javondmitriyjosé miguel comes into clinic today at the request of Dr. Delgado for removal of suture(s).    The patient is status post right open carpal tunnel release, DOS: 3/1/18.   There has been no history of infection or drainage.    O: 4 suture(s) are seen located at the base of the right palm. The wound is healing well with no signs of infection.    A: Suture removal.    P: All sutures were easily removed today. Routine wound care discussed. The patient will follow up as needed, per post op plan. If there are any concerns or signs and symptoms of infection, patient will follow up in clinic. Patient is agreeable with plan.    Tiago Cline RN

## 2019-01-15 ENCOUNTER — OFFICE VISIT (OUTPATIENT)
Dept: URGENT CARE | Facility: RETAIL CLINIC | Age: 64
End: 2019-01-15
Payer: COMMERCIAL

## 2019-01-15 VITALS
TEMPERATURE: 97.7 F | OXYGEN SATURATION: 99 % | HEART RATE: 78 BPM | SYSTOLIC BLOOD PRESSURE: 149 MMHG | DIASTOLIC BLOOD PRESSURE: 88 MMHG

## 2019-01-15 DIAGNOSIS — J20.9 ACUTE BRONCHITIS WITH SYMPTOMS GREATER THAN 10 DAYS: Primary | ICD-10-CM

## 2019-01-15 PROCEDURE — 99213 OFFICE O/P EST LOW 20 MIN: CPT | Performed by: FAMILY MEDICINE

## 2019-01-15 RX ORDER — CEPHALEXIN 500 MG/1
500 CAPSULE ORAL 2 TIMES DAILY
Qty: 20 CAPSULE | Refills: 0 | Status: SHIPPED | OUTPATIENT
Start: 2019-01-15 | End: 2019-01-25

## 2019-01-15 NOTE — PROGRESS NOTES
SUBJECTIVE:  Aníbal Goldstein is a 63 year old male who presents to the clinic today with a chief complaint of cough  for 2 week(s).  His cough is described as productive of yellow sputum.    The patient's symptoms are moderate and worsening.  Associated symptoms include nasal congestion and malaise. The patient's symptoms are exacerbated by lying down  Patient has been using OTC cough suppressants  to improve symptoms.    Past Medical History:   Diagnosis Date     Cervicalgia 1/2008     Pain in joint, shoulder region 1/2008     Current Outpatient Medications   Medication Sig Dispense Refill     Acetaminophen (TYLENOL PO)        cephALEXin (KEFLEX) 500 MG capsule Take 1 capsule (500 mg) by mouth 2 times daily for 10 days 20 capsule 0     Pseudoeph-Doxylamine-DM-APAP (NYQUIL PO)        HYDROcodone-acetaminophen (NORCO) 5-325 MG per tablet Take 1-2 tablets by mouth every 4 hours as needed for other (Moderate to Severe Pain) (Patient not taking: Reported on 1/15/2019) 10 tablet 0     History   Smoking Status     Never Smoker   Smokeless Tobacco     Never Used       ROS  Review of systems negative except as stated above.    OBJECTIVE:  /88 (BP Location: Left arm)   Pulse 78   Temp 97.7  F (36.5  C) (Oral)   SpO2 99%   GENERAL APPEARANCE: healthy, alert and no distress  EYES: EOMI,  PERRL, conjunctiva clear  HENT: ear canals and TM's normal.  Nose and mouth without ulcers, erythema or lesions  NECK: supple, nontender, no lymphadenopathy  RESP: rhonchi throughout  CV: regular rates and rhythm, normal S1 S2, no murmur noted  ABDOMEN:  soft, nontender, no HSM or masses and bowel sounds normal  NEURO: Normal strength and tone, sensory exam grossly normal,  normal speech and mentation  SKIN: no suspicious lesions or rashes    ASSESSMENT:    Acute Bronchitis    PLAN:  Cephalexin, fluids, rest.  Symptomatic measures encouraged, humidified air, plenty of fluids.  Follow up with primary care provider if no  improvement.

## 2021-01-04 ENCOUNTER — OFFICE VISIT (OUTPATIENT)
Dept: FAMILY MEDICINE | Facility: CLINIC | Age: 66
End: 2021-01-04
Payer: COMMERCIAL

## 2021-01-04 ENCOUNTER — ANCILLARY PROCEDURE (OUTPATIENT)
Dept: GENERAL RADIOLOGY | Facility: CLINIC | Age: 66
End: 2021-01-04
Attending: NURSE PRACTITIONER
Payer: COMMERCIAL

## 2021-01-04 VITALS
DIASTOLIC BLOOD PRESSURE: 68 MMHG | HEIGHT: 67 IN | TEMPERATURE: 98.1 F | WEIGHT: 213 LBS | SYSTOLIC BLOOD PRESSURE: 132 MMHG | BODY MASS INDEX: 33.43 KG/M2 | HEART RATE: 92 BPM | OXYGEN SATURATION: 96 %

## 2021-01-04 DIAGNOSIS — M25.562 CHRONIC PAIN OF LEFT KNEE: ICD-10-CM

## 2021-01-04 DIAGNOSIS — M62.89 HAMSTRING TIGHTNESS: ICD-10-CM

## 2021-01-04 DIAGNOSIS — G89.29 CHRONIC PAIN OF LEFT KNEE: ICD-10-CM

## 2021-01-04 DIAGNOSIS — M25.562 CHRONIC PAIN OF LEFT KNEE: Primary | ICD-10-CM

## 2021-01-04 DIAGNOSIS — Z23 NEED FOR VACCINATION: ICD-10-CM

## 2021-01-04 DIAGNOSIS — G89.29 CHRONIC PAIN OF LEFT KNEE: Primary | ICD-10-CM

## 2021-01-04 PROCEDURE — 90471 IMMUNIZATION ADMIN: CPT | Performed by: NURSE PRACTITIONER

## 2021-01-04 PROCEDURE — 73562 X-RAY EXAM OF KNEE 3: CPT | Mod: LT | Performed by: RADIOLOGY

## 2021-01-04 PROCEDURE — 99214 OFFICE O/P EST MOD 30 MIN: CPT | Mod: 25 | Performed by: NURSE PRACTITIONER

## 2021-01-04 PROCEDURE — 90715 TDAP VACCINE 7 YRS/> IM: CPT | Performed by: NURSE PRACTITIONER

## 2021-01-04 PROCEDURE — 90472 IMMUNIZATION ADMIN EACH ADD: CPT | Performed by: NURSE PRACTITIONER

## 2021-01-04 PROCEDURE — 90750 HZV VACC RECOMBINANT IM: CPT | Performed by: NURSE PRACTITIONER

## 2021-01-04 ASSESSMENT — PAIN SCALES - GENERAL: PAINLEVEL: NO PAIN (1)

## 2021-01-04 ASSESSMENT — MIFFLIN-ST. JEOR: SCORE: 1714.79

## 2021-01-04 NOTE — PROGRESS NOTES
"  Subjective     Aníbal Goldstein is a 65 year old male who presents to clinic today for the following health issues accompanied by his self:    HPI     Concern - Worsening hip pain   Onset: years   Description: bilateral hip pain   Intensity: moderate  Therapies tried and outcome: None    Began having bilateral hip pain a couple of years ago. Pain is worse in the mornings when getting out of bed. Walking for a longer period of time also makes the pain worse. Says the more he uses it the more he feels it.     Has hip discomfort almost all day, every day. Feels like a dull constant pain. On a 0-10 pain scale, pain is hardly a 1 today and some days it gets up to a 5.   Pain does not wake him up at night. Is a  so sits most days when working.   Denies any trauma or injury.    Tylenol and ibuprofen doesn't help the discomfort. Heat helps temporarily.   Rest helps, but then it is hard to move after sitting all day.     Has also been having bilateral knee pain for years. He says knee pain almost bothers him more than the hips. Left knee is worse than his right.    No numbness/tingling down legs. No swelling or warmth. No fevers.    Review of Systems   Constitutional, HEENT, cardiovascular, pulmonary, gi and gu systems are negative, except as otherwise noted.      Objective    /68   Pulse 92   Temp 98.1  F (36.7  C) (Temporal)   Ht 1.702 m (5' 7\")   Wt 96.6 kg (213 lb)   SpO2 96%   BMI 33.36 kg/m    Body mass index is 33.36 kg/m .     Physical Exam   GENERAL: healthy, alert and no distress  RESP: lungs clear to auscultation - no rales, rhonchi or wheezes  CV: regular rate and rhythm, normal S1 S2, no S3 or S4, no murmur, click or rub, no peripheral edema  MS: decreased range of motion with bilateral hip flexion, joint line tenderness with moderate palpation and patellar grinding to left knee. Medial left knee has mild swelling. Bilateral gluteal and hamstring muscle tightness.Bilateral lower extremity " exam shows normal strength and muscle mass without  erythema, induration, or nodules, no evidence of joint instability. Mild ataxic gait.  SKIN: no suspicious lesions or rashes  NEURO: Normal strength and tone, mentation intact and speech normal  BACK: no CVA tenderness, no paralumbar tenderness  PSYCH: mentation appears normal, affect normal/bright      Xray - Reviewed and interpreted by me and Anaya Cervantes.   Left knee- mild soft tissue swelling and joint space narrowing with mild arthritic changes.      Assessment & Plan   Aníbal was seen today for musculoskeletal problem.    Diagnoses and all orders for this visit:    Chronic pain of left knee  -     Cancel: XR Knee Standing 1v Ap Bilateral & 2v Left; Future  -     XR Knee Standing AP Bilat [place] Bilat Lat Left; Future    Tdap boosted today and second dose of Shingrax given.    Reviewed x-ray imaging with him in the room.    Educated on the importance of weight loss and the impact it has on bilateral lower extremities, both knees and hips.   Healthy diet and exercise discussed with patient due to BMI. Mediterranean diet and DASH diet encouraged and to limit salt and trans fat intake. Increase fruit and vegetable intake. Increase in exercise routine to 150 minutes of exercise per week and 2 sessions of strength training. Can also do 10 minute intervals of exercise throughout the day.     Encouraged daily stretching for gluteal and hamstring tightness. Plans to do exercises at home.  Suggested the use of heat or ice for comfort. Can take ibuprofen for pain or tylenol.     If left knee becomes red and swollen-advised to rest, use ice, compression sleeve, and elevation.     Plans to call if he would like a physical therapy referral for bilateral knee/hip pain. He will also follow up if pain does not get better with weight loss and at home stretching exercises. Will then further consider orthopedic referral for joint injection or synvisc.    BMI:   Estimated body  "mass index is 33.36 kg/m  as calculated from the following:    Height as of this encounter: 1.702 m (5' 7\").    Weight as of this encounter: 96.6 kg (213 lb).   Weight management plan: Discussed healthy diet and exercise guidelines       Follow up for annual health maintenance exam. Will get fasting labs at that time.       Patient seen by Abbey Collins RN, FNP student. Patient examined and note authored by BRIAN Walter, CNP.    BRIAN Herrera CNP  [facility]    "

## 2021-01-05 NOTE — PROGRESS NOTES
"  Aníbal Goldstein is a 65 year old male who presents to clinic today for the following health issues accompanied by his self:         HPI          Concern - Worsening hip pain     Onset: years     Description: bilateral hip pain     Intensity: moderate    Therapies tried and outcome: None         Began having bilateral hip pain a couple of years ago. Pain is worse in the mornings when getting out of bed. Walking for a longer period of time also makes the pain worse. Says the more he uses it the more he feels it.          Has hip discomfort almost all day, every day. Feels like a dull constant pain. On a 0-10 pain scale, pain is hardly a 1 today and some days it gets up to a 5.     Pain does not wake him up at night. Is a  so sits most days when working.     Denies any trauma or injury.         Tylenol and ibuprofen doesn't help the discomfort. Heat helps temporarily.     Rest helps, but then it is hard to move after sitting all day.          Has also been having bilateral knee pain for years. He says knee pain almost bothers him more than the hips. Left knee is worse than his right.         No numbness/tingling down legs. No swelling or warmth. No fevers.         Review of Systems     Constitutional, HEENT, cardiovascular, pulmonary, gi and gu systems are negative, except as otherwise noted.                   Objective           /68   Pulse 92   Temp 98.1  F (36.7  C) (Temporal)   Ht 1.702 m (5' 7\")   Wt 96.6 kg (213 lb)   SpO2 96%   BMI 33.36 kg/m    Body mass index is 33.36 kg/m .          Physical Exam     GENERAL: healthy, alert and no distress    RESP: lungs clear to auscultation - no rales, rhonchi or wheezes    CV: regular rate and rhythm, normal S1 S2, no S3 or S4, no murmur, click or rub, no peripheral edema    MS: decreased range of motion with bilateral hip flexion, joint line tenderness with moderate palpation and patellar grinding to left knee. Medial left knee has mild swelling. " Bilateral gluteal and hamstring muscle tightness.Bilateral lower extremity exam shows normal strength and muscle mass without  erythema, induration, or nodules, no evidence of joint instability. Mild ataxic gait.    SKIN: no suspicious lesions or rashes    NEURO: Normal strength and tone, mentation intact and speech normal    BACK: no CVA tenderness, no paralumbar tenderness    PSYCH: mentation appears normal, affect normal/bright              Xray - Reviewed and interpreted by me and Anaya Cervantes.   Left knee- mild soft tissue swelling and joint space narrowing with mild arthritic changes.              Assessment & Plan     Aníbal was seen today for musculoskeletal problem.         Diagnoses and all orders for this visit:     Hamstring tightness    Chronic pain of left knee    -     Cancel: XR Knee Standing 1v Ap Bilateral & 2v Left; Future    -     XR Knee Standing AP Bilat [place] Bilat Lat Left; Future         Tdap boosted today and second dose of Shingrax given.         Reviewed x-ray imaging with him in the room.         Educated on the importance of weight loss and the impact it has on bilateral lower extremities, both knees and hips.     Healthy diet and exercise discussed with patient due to BMI. Mediterranean diet and DASH diet encouraged and to limit salt and trans fat intake. Increase fruit and vegetable intake. Increase in exercise routine to 150 minutes of exercise per week and 2 sessions of strength training. Can also do 10 minute intervals of exercise throughout the day.          Encouraged daily stretching for gluteal and hamstring tightness. Plans to do exercises at home.    Suggested the use of heat or ice for comfort. Can take ibuprofen for pain or tylenol.          If left knee becomes red and swollen-advised to rest, use ice, compression sleeve, and elevation.          Plans to call if he would like a physical therapy referral for bilateral knee/hip pain. He will also follow up if pain does not  "get better with weight loss and at home stretching exercises. Will then further consider orthopedic referral for joint injection or synvisc.         BMI:     Estimated body mass index is 33.36 kg/m  as calculated from the following:      Height as of this encounter: 1.702 m (5' 7\").      Weight as of this encounter: 96.6 kg (213 lb).     Weight management plan: Discussed healthy diet and exercise guidelines               Follow up for annual health maintenance exam. Will get fasting labs at that time.               Patient seen by Abbey Collins RN, FNP student. Patient examined and note authored by BRIAN Walter, CNP.         BRIAN Herrera CNP    [employer] [facility]  "

## 2021-03-13 ENCOUNTER — HEALTH MAINTENANCE LETTER (OUTPATIENT)
Age: 66
End: 2021-03-13

## 2021-03-23 ENCOUNTER — OFFICE VISIT (OUTPATIENT)
Dept: FAMILY MEDICINE | Facility: OTHER | Age: 66
End: 2021-03-23
Payer: COMMERCIAL

## 2021-03-23 VITALS
TEMPERATURE: 98.7 F | DIASTOLIC BLOOD PRESSURE: 88 MMHG | OXYGEN SATURATION: 95 % | HEART RATE: 84 BPM | SYSTOLIC BLOOD PRESSURE: 134 MMHG

## 2021-03-23 DIAGNOSIS — K21.9 GASTROESOPHAGEAL REFLUX DISEASE, UNSPECIFIED WHETHER ESOPHAGITIS PRESENT: ICD-10-CM

## 2021-03-23 DIAGNOSIS — R05.9 COUGH: Primary | ICD-10-CM

## 2021-03-23 PROCEDURE — 99213 OFFICE O/P EST LOW 20 MIN: CPT | Performed by: PHYSICIAN ASSISTANT

## 2021-03-23 NOTE — PROGRESS NOTES
Assessment & Plan     Cough  Symptoms are consistent with Viral URI, cough initially started at least 10 days ago and has been improving since then, much less coughing however now has some reflux symptoms he is treating with PPI, see below.  As far as cold, offered his COVID testing, he declined at this time.  He is at end of his isolation requirements any ways and with no fever and his symptoms significantly improved he can resume normal activities, encouraged to continue to mask. Discussed post infectious coughs can last 3-5 weeks afterwards but should continue to improve.     Wife has upcoming procedure but that is not for > 2 weeks still and she will be tested for COVID prior to procedure and her testing will fall 14 days from the end of his 10 day isolation period so it should be accurate, encouraged them to mask in common spaces, continue to sanitize and wipe down surfaces and distance if possible in the meantime.     Gastroesophageal reflux disease, unspecified whether esophagitis present  Continue PPI, can increase OTC PPI to BID or 40 mg daily.  If not improving follow-up in clinic.       Return in about 1 week (around 3/30/2021) for If not improving, sooner if worse or new concerns.    Options for treatment and follow-up care were reviewed with the patient and/or guardian. Patient and/or guardian engaged in the decision making process and verbalized understanding of the options discussed and agreed with the final plan.    CORONA Enciso [facility]AR[facility] Spangler is a 65 year old who presents for the following health issues     HPI     Acute Illness  Acute illness concerns: cough  Onset/Duration: 10 days  Symptoms:  Fever: no, feels warm in the morning at times.   Chills/Sweats: no  Headache (location?): no  Sinus Pressure: no  Conjunctivitis:  no  Ear Pain: no  Rhinorrhea: no  Congestion: no  Sore Throat: no  Cough: YES, productive, less and less over time since  it started. It is improving. Maybe worse in the morning, not really sure, hasn't really coughed much today at all.   Wheeze: no, no shortness of breath   Decreased Appetite: no  Nausea: no  Vomiting: no  Diarrhea: no  Loss of taste/smell: no  Rashes: no  Fatigue/Achiness: no - it's all normal for him.   Sick/Strep Exposure: no  Therapies tried and outcome: Prilosec    - Noticed more reflux after eating.  Started to take some prilosec. No vomiting.  No changes to bowel movements.       Review of Systems   Constitutional, HEENT, cardiovascular, pulmonary, gi and gu systems are negative, except as otherwise noted.      Objective    /88   Pulse 84   Temp 98.7  F (37.1  C)   SpO2 95%   There is no height or weight on file to calculate BMI.  Physical Exam   GENERAL: healthy, alert and no distress  EYES: Eyes grossly normal to inspection, PERRL and conjunctivae and sclerae normal  HENT: ear canals and TM's normal, nose and mouth without ulcers or lesions  NECK: no adenopathy, no asymmetry, masses, or scars and thyroid normal to palpation  RESP: lungs clear to auscultation - no rales, rhonchi or wheezes  CV: regular rate and rhythm, normal S1 S2, no S3 or S4, no murmur, click or rub, no peripheral edema and peripheral pulses strong  MS: no gross musculoskeletal defects noted, no edema

## 2021-03-31 ENCOUNTER — OFFICE VISIT (OUTPATIENT)
Dept: FAMILY MEDICINE | Facility: CLINIC | Age: 66
End: 2021-03-31
Payer: COMMERCIAL

## 2021-03-31 ENCOUNTER — ANCILLARY PROCEDURE (OUTPATIENT)
Dept: GENERAL RADIOLOGY | Facility: CLINIC | Age: 66
End: 2021-03-31
Attending: FAMILY MEDICINE
Payer: COMMERCIAL

## 2021-03-31 VITALS
BODY MASS INDEX: 32.49 KG/M2 | HEIGHT: 67 IN | SYSTOLIC BLOOD PRESSURE: 136 MMHG | TEMPERATURE: 98.3 F | WEIGHT: 207 LBS | HEART RATE: 82 BPM | OXYGEN SATURATION: 94 % | RESPIRATION RATE: 16 BRPM | DIASTOLIC BLOOD PRESSURE: 84 MMHG

## 2021-03-31 DIAGNOSIS — R05.9 COUGH: ICD-10-CM

## 2021-03-31 DIAGNOSIS — R05.9 COUGH: Primary | ICD-10-CM

## 2021-03-31 PROCEDURE — 99213 OFFICE O/P EST LOW 20 MIN: CPT | Performed by: FAMILY MEDICINE

## 2021-03-31 PROCEDURE — 71046 X-RAY EXAM CHEST 2 VIEWS: CPT | Performed by: RADIOLOGY

## 2021-03-31 RX ORDER — FLUTICASONE PROPIONATE 110 UG/1
1 AEROSOL, METERED RESPIRATORY (INHALATION) 2 TIMES DAILY
Qty: 12 G | Refills: 0 | Status: SHIPPED | OUTPATIENT
Start: 2021-03-31 | End: 2022-10-03

## 2021-03-31 RX ORDER — FAMOTIDINE 20 MG/1
20 TABLET, FILM COATED ORAL
Qty: 30 TABLET | Refills: 1 | Status: SHIPPED | OUTPATIENT
Start: 2021-03-31 | End: 2021-04-07

## 2021-03-31 ASSESSMENT — MIFFLIN-ST. JEOR: SCORE: 1687.58

## 2021-03-31 NOTE — PROGRESS NOTES
Assessment & Plan   1. Cough: Multiple weeks duration of cough.  Wheezing on exam.  No history of COPD or asthma.  Patient is non-smoker.  Possibly due to reflux as previously thought especially since symptoms are worse at night and when lying flat.  Recommend adding Pepcid at night.  Could consider testing for H. pylori.  Given wheezing on exam we will also will trial Flovent inhaler for 1 month.  Follow-up if not improving within 2 weeks.  At which time would consider formal PFTs and endoscopy.  Patient agreeable plan.  - XR Chest 2 Views; Future  - fluticasone (FLOVENT HFA) 110 MCG/ACT inhaler; Inhale 1 puff into the lungs 2 times daily  Dispense: 12 g; Refill: 0  - famotidine (PEPCID) 20 MG tablet; Take 1 tablet (20 mg) by mouth nightly as needed (reflux)  Dispense: 30 tablet; Refill: 1     Return in about 2 weeks (around 4/14/2021), or if symptoms worsen or fail to improve.    Jaime Navarrete MD  [facility]     This chart is completed utilizing dictation software; typos and/or incorrect word substitutions may unintentionally occur.      Subjective     Aníbal Goldstein is a 65 year old male who presents to clinic today for the following health issues    HPI     GERD/Heartburn  Onset/Duration: 2 weeks   Description: Pain in the center of chest. Sharp pains with coughing.  Intensity: severe pain while laying down.   Progression of Symptoms: worsening  Accompanying Signs & Symptoms:  Does it feel like food gets stuck or trouble swallowing: no  Nausea: no  Vomiting (bloody?): no  Abdominal Pain: no  Black-Tarry stools: no  Bloody stools: no  History:  Previous similar episodes: no  Previous ulcers: no  Precipitating factors:   Caffeine use: YES. 1-2 cups of coffee per day, but patient quit for 1 week.   Alcohol use: 0-1 per week.   NSAID/Aspirin use: no  Tobacco use: no  Worse with  Spaghetti Sauce.    Therapies tried and outcome:             Medications: Omeprazole (Prilosec)  ", Tums, and angel seizer     Patient has ongoing cough.  Feels a deep in his central chest.  Worse with lying down.  Is not having any significant heartburn sensation however.  Has only had 2 good nights without coughing.  Is getting distressed due to lack of sleep.  Has tried omeprazole twice daily instead of once daily without much relief.    Patient will hear some wheezing.    No other specific concerns.    Review of Systems   Constitutional, HEENT, lymph, Derm, cardiovascular, pulmonary, gi and gu systems are negative, except as otherwise noted.      Objective    /84   Pulse 82   Temp 98.3  F (36.8  C) (Temporal)   Resp 16   Ht 1.702 m (5' 7\")   Wt 93.9 kg (207 lb)   SpO2 94%   BMI 32.42 kg/m    Body mass index is 32.42 kg/m .  Physical Exam   General: Appears well and in no acute distress.  Cardiovascular: Regular rate and rhythm, normal S1 and S2 without murmur. No extra heartsounds or friction rub. Radial pulses present and equal bilaterally.  Respiratory: Bilateral wheezing over lower lobes and right upper lobe.  Cough with exhalation.  Musculoskeletal: No gross extremity deformities. No peripheral edema. Normal muscle bulk.     X-ray: Appears clear overall.  No significant opacity concerning for pneumonia, pneumothorax, pleural effusion.  Wait formal radiology read.      "

## 2021-03-31 NOTE — PATIENT INSTRUCTIONS
Patient Education     Chronic Cough with Uncertain Cause (Adult)  Everyone has had a cough as part of the common cold, flu, or bronchitis. This kind of cough occurs along with an achy feeling, low-grade fever, nasal and sinus congestion, and a scratchy or sore throat. This usually gets better in 2 to 3 weeks. A cough that lasts longer than 3 weeks may be due to other causes. Your healthcare provider may refer to this as a chronic cough.   If your cough does not improve over the next 2 weeks, further testing may be needed. Follow up with your healthcare provider as advised. Cough suppressants may be recommended. Based on your exam today, the exact cause of your cough is not certain. Below are some common causes for persistent cough.   Smoker's cough  Smoker s cough doesn t go away. If you continue to smoke, it only gets worse. The cough is from irritation in the air passages. Talk to your healthcare provider about quitting. Medicines or nicotine-replacement products, like gum or the patch, may make quitting easier.   Postnasal drip  A cough that is worse at night may be due to postnasal drip. Excess mucus in the nose drains from the back of your nose to your throat. This triggers the cough reflex. Postnasal drip may be due to a sinus infection or allergy. Common allergens include dust, tobacco smoke (both inhaled and secondhand smoke), environmental pollutants, pollen, mold, pets, cleaning agents, room deodorizers, and chemical fumes. Over-the-counter antihistamines or decongestants may be helpful for allergies. A sinus infection may requires antibiotic treatment. See your healthcare provider if symptoms continue.     Medicines  Certain prescribed medicines can cause a chronic cough in some people:    ACE inhibitors for high blood pressure. These include benazepril, captopril, enalapril, fosinopril, lisinopril, quinapril, ramipril, and others.    Beta-blockers for high blood pressure and other conditions. These  include propranolol, atenolol, metoprolol, nadolol, and others.  Let your healthcare provider know if you are taking any of these. The chronic cough may mean your medicine needs to be changed.   Asthma  Cough may be the only sign of mild asthma. You may have tests to find out if asthma is causing your cough. You may also take asthma medicine on a trial basis.   Acid reflux (heartburn, GERD)  The esophagus is the tube that carries food from the mouth to the stomach. A valve at its lower end prevents stomach acids from flowing upward. If this valve does not work properly, acid from the stomach enters the esophagus. This may cause a burning pain in the upper abdomen or lower chest, belching, or cough. Symptoms are often worse when lying flat. Avoid eating or drinking before bedtime. Try using extra pillows to raise your upper body, or place 4-inch blocks under the head of your bed. You may try an over-the-counter (OTC) antacid or an acid-blocking medicine such as famotidine, cimetidine, esomeprazole, lansoprazole, or omeprazole. Stronger medicines for this condition can be prescribed by your healthcare provider. Ask your healthcare provider which OTC medicine to use. Depending on your current medicines, some OTC medicines may cause drug interactions and should be avoided.   Follow-up care  Follow up with your healthcare provider, or as advised, if your cough does not improve. Further testing may be needed.   Note: If an X-ray was taken, a specialist will review it. You will be notified of any new findings that may affect your care.   When to seek medical advice  Call your healthcare provider right away if any of these occur:    Mild wheezing or difficulty breathing    Fever of 100.4 F (38 C) or higher, or as directed by your healthcare provider    Unexpected weight loss    Coughing up large amounts of colored sputum or blood-tinged sputum    Night sweats (sheets and pajamas get soaking wet)  Call 911  Call 911 if any  of these occur:     Coughing up blood    Moderate to severe trouble breathing or wheezing  Henry last reviewed this educational content on 6/1/2018 2000-2020 [employer]. All rights reserved. This information is not intended as a substitute for professional medical care. Always follow your healthcare professional's instructions.

## 2021-04-02 ENCOUNTER — TELEPHONE (OUTPATIENT)
Dept: FAMILY MEDICINE | Facility: OTHER | Age: 66
End: 2021-04-02

## 2021-04-02 DIAGNOSIS — R05.9 COUGH: Primary | ICD-10-CM

## 2021-04-02 RX ORDER — INHALER, ASSIST DEVICES
SPACER (EA) MISCELLANEOUS
Qty: 1 EACH | Refills: 0 | Status: ON HOLD | OUTPATIENT
Start: 2021-04-02 | End: 2022-10-06

## 2021-04-02 NOTE — TELEPHONE ENCOUNTER
Pt requested Chamber for Inhaler  We have Aerochamber or Opticedsonber  Araceli , [facility] [phone]

## 2021-04-07 ENCOUNTER — OFFICE VISIT (OUTPATIENT)
Dept: FAMILY MEDICINE | Facility: CLINIC | Age: 66
End: 2021-04-07
Payer: COMMERCIAL

## 2021-04-07 VITALS
SYSTOLIC BLOOD PRESSURE: 120 MMHG | TEMPERATURE: 97.1 F | HEART RATE: 79 BPM | BODY MASS INDEX: 33.27 KG/M2 | WEIGHT: 207 LBS | RESPIRATION RATE: 14 BRPM | OXYGEN SATURATION: 96 % | DIASTOLIC BLOOD PRESSURE: 72 MMHG | HEIGHT: 66 IN

## 2021-04-07 DIAGNOSIS — Z12.11 SPECIAL SCREENING FOR MALIGNANT NEOPLASMS, COLON: ICD-10-CM

## 2021-04-07 DIAGNOSIS — E78.5 HYPERLIPIDEMIA LDL GOAL <100: ICD-10-CM

## 2021-04-07 DIAGNOSIS — Z12.5 SCREENING FOR PROSTATE CANCER: ICD-10-CM

## 2021-04-07 DIAGNOSIS — Z00.00 ANNUAL PHYSICAL EXAM: Primary | ICD-10-CM

## 2021-04-07 DIAGNOSIS — Z13.1 SCREENING FOR DIABETES MELLITUS: ICD-10-CM

## 2021-04-07 LAB
ANION GAP SERPL CALCULATED.3IONS-SCNC: 3 MMOL/L (ref 3–14)
BUN SERPL-MCNC: 17 MG/DL (ref 7–30)
CALCIUM SERPL-MCNC: 8.9 MG/DL (ref 8.5–10.1)
CHLORIDE SERPL-SCNC: 104 MMOL/L (ref 94–109)
CHOLEST SERPL-MCNC: 206 MG/DL
CO2 SERPL-SCNC: 28 MMOL/L (ref 20–32)
CREAT SERPL-MCNC: 0.86 MG/DL (ref 0.66–1.25)
GFR SERPL CREATININE-BSD FRML MDRD: >90 ML/MIN/{1.73_M2}
GLUCOSE SERPL-MCNC: 91 MG/DL (ref 70–99)
HDLC SERPL-MCNC: 65 MG/DL
LDLC SERPL CALC-MCNC: 110 MG/DL
NONHDLC SERPL-MCNC: 141 MG/DL
POTASSIUM SERPL-SCNC: 4 MMOL/L (ref 3.4–5.3)
PSA SERPL-ACNC: 0.93 UG/L (ref 0–4)
SODIUM SERPL-SCNC: 135 MMOL/L (ref 133–144)
TRIGL SERPL-MCNC: 154 MG/DL

## 2021-04-07 PROCEDURE — G0103 PSA SCREENING: HCPCS | Performed by: FAMILY MEDICINE

## 2021-04-07 PROCEDURE — 99396 PREV VISIT EST AGE 40-64: CPT | Performed by: FAMILY MEDICINE

## 2021-04-07 PROCEDURE — 80061 LIPID PANEL: CPT | Performed by: FAMILY MEDICINE

## 2021-04-07 PROCEDURE — 36415 COLL VENOUS BLD VENIPUNCTURE: CPT | Performed by: FAMILY MEDICINE

## 2021-04-07 PROCEDURE — 80048 BASIC METABOLIC PNL TOTAL CA: CPT | Performed by: FAMILY MEDICINE

## 2021-04-07 ASSESSMENT — ENCOUNTER SYMPTOMS
HEMATURIA: 0
DIARRHEA: 0
CONSTIPATION: 0
HEMATOCHEZIA: 0
EYE PAIN: 0
DIZZINESS: 0
CHILLS: 0
NERVOUS/ANXIOUS: 0
COUGH: 1
ABDOMINAL PAIN: 0

## 2021-04-07 ASSESSMENT — MIFFLIN-ST. JEOR: SCORE: 1674.57

## 2021-04-07 NOTE — PROGRESS NOTES
SUBJECTIVE:   CC: Aníbal Goldstein is an 65 year old male who presents for preventative health visit.     Patient has been advised of split billing requirements and indicates understanding: Yes  Healthy Habits:     Getting at least 3 servings of Calcium per day:  Yes    Bi-annual eye exam:  NO    Dental care twice a year:  Yes    Sleep apnea or symptoms of sleep apnea:  None    Diet:  Regular (no restrictions)    Frequency of exercise:  None    Taking medications regularly:  Yes    PHQ-2 Total Score: 0    Additional concerns today:  No    Today's PHQ-2 Score:   PHQ-2 ( 1999 Pfizer) 4/7/2021   Q1: Little interest or pleasure in doing things 0   Q2: Feeling down, depressed or hopeless 0   PHQ-2 Score 0   Q1: Little interest or pleasure in doing things Not at all   Q2: Feeling down, depressed or hopeless Not at all   PHQ-2 Score 0     Abuse: Current or Past(Physical, Sexual or Emotional)- No  Do you feel safe in your environment? Yes    Have you ever done Advance Care Planning? (For example, a Health Directive, POLST, or a discussion with a medical provider or your loved ones about your wishes): No, advance care planning information given to patient to review.  Patient plans to discuss their wishes with loved ones or provider.       Social History     Tobacco Use     Smoking status: Never Smoker     Smokeless tobacco: Never Used   Substance Use Topics     Alcohol use: Yes     Alcohol/week: 1.7 standard drinks     Comment: 0-1 per week      Alcohol Use 4/7/2021   Prescreen: >3 drinks/day or >7 drinks/week? No   Prescreen: >3 drinks/day or >7 drinks/week? -   No flowsheet data found.    Last PSA:   PSA   Date Value Ref Range Status   01/09/2009 0.69 0 - 4 ug/L Final     Reviewed orders with patient. Reviewed health maintenance and updated orders accordingly - Yes  BP Readings from Last 3 Encounters:   04/07/21 120/72   03/31/21 136/84   03/23/21 134/88    Wt Readings from Last 3 Encounters:   04/07/21 93.9 kg (207 lb)    03/31/21 93.9 kg (207 lb)   01/04/21 96.6 kg (213 lb)            Patient Active Problem List   Diagnosis     Cervicalgia     Pain in joint, shoulder region     CARDIOVASCULAR SCREENING; LDL GOAL LESS THAN 160     Past Surgical History:   Procedure Laterality Date     HC KNEE SCOPE,MED/LAT MENISECTOMY  2/9/2009    Right knee     HERNIA REPAIR, INGUINAL RT/LT      lt ?     RELEASE CARPAL TUNNEL Right 3/1/2018    Procedure: RELEASE CARPAL TUNNEL;  Right carpal tunnel release;  Surgeon: Britney Delgado MD;  Location: [facility] OR     ROTATOR CUFF REPAIR RT/LT  02/18/10    Right side. Done at [facility].       Social History     Tobacco Use     Smoking status: Never Smoker     Smokeless tobacco: Never Used   Substance Use Topics     Alcohol use: Yes     Alcohol/week: 1.7 standard drinks     Comment: 0-1 per week      Family History   Problem Relation Age of Onset     Diabetes Father         Adult onset     Alzheimer Disease Father      Alzheimer Disease Mother      Colon Cancer No family hx of      Prostate Cancer No family hx of      Breast Cancer No family hx of      Ulcerative Colitis No family hx of      Crohn's Disease No family hx of          Current Outpatient Medications   Medication Sig Dispense Refill     Acetaminophen (TYLENOL PO)        fluticasone (FLOVENT HFA) 110 MCG/ACT inhaler Inhale 1 puff into the lungs 2 times daily 12 g 0     spacer (OPTICHAMBER FOX) holding chamber Use with inhaler 1 each 0     Allergies   Allergen Reactions     No Known Allergies      Reviewed and updated as needed this visit by clinical staff  Tobacco  Allergies  Meds  Problems  Med Hx  Surg Hx  Fam Hx  Soc Hx        Reviewed and updated as needed this visit by Provider  Tobacco  Allergies  Meds  Problems  Med Hx  Surg Hx  Fam Hx         Past Medical History:   Diagnosis Date     Cervicalgia 1/2008     Pain in joint, shoulder region 1/2008      Past Surgical History:   Procedure Laterality Date      "HC KNEE SCOPE,MED/LAT MENISECTOMY  2/9/2009    Right knee     HERNIA REPAIR, INGUINAL RT/LT      lt ?     RELEASE CARPAL TUNNEL Right 3/1/2018    Procedure: RELEASE CARPAL TUNNEL;  Right carpal tunnel release;  Surgeon: Britney Delgado MD;  Location: [facility] OR     ROTATOR CUFF REPAIR RT/LT  02/18/10    Right side. Done at [facility].       Review of Systems   Constitutional: Negative for chills.   HENT: Negative for congestion and ear pain.    Eyes: Negative for pain.   Respiratory: Positive for cough.    Cardiovascular: Negative for chest pain.   Gastrointestinal: Negative for abdominal pain, constipation, diarrhea and hematochezia.   Genitourinary: Negative for hematuria.   Neurological: Negative for dizziness.   Psychiatric/Behavioral: The patient is not nervous/anxious.      Coughing improved with trial inhaler given.    OBJECTIVE:   /72   Pulse 79   Temp 97.1  F (36.2  C) (Temporal)   Resp 14   Ht 1.681 m (5' 6.18\")   Wt 93.9 kg (207 lb)   SpO2 96%   BMI 33.23 kg/m      Physical Exam  GENERAL: Obese male. Healthy, alert and no distress  EYES: Eyes grossly normal to inspection, PERRL and conjunctivae and sclerae normal  HENT: ear canals and TM's normal, nose and mouth without ulcers or lesions  NECK: no adenopathy, no asymmetry, masses, or scars and thyroid normal to palpation  RESP: lungs clear to auscultation - no rales, rhonchi or wheezes  CV: regular rate and rhythm, normal S1 S2, no S3 or S4, no murmur, click or rub, no peripheral edema and peripheral pulses strong  ABDOMEN: soft, nontender, no hepatosplenomegaly, no masses and bowel sounds normal  RECTAL: normal sphincter tone, no rectal masses, prostate normal size, smooth, nontender without nodules or masses  MS: no gross musculoskeletal defects noted, no edema  SKIN: no suspicious lesions or rashes  NEURO: Normal strength and tone, mentation intact and speech normal  PSYCH: mentation appears normal, affect " "normal/bright    Diagnostic Test Results: Labs pending    ASSESSMENT/PLAN:   1. Annual physical exam: Sal[place] personal health and safety. Up-to-date on vaccines. Working getting Covid vaccine. Routine screenings ordered. Elects to undergo colonoscopy and PSA screenings. Plan for diabetes and check his cholesterol. Follow-up in 1 year for physical exam.  - GASTROENTEROLOGY ADULT REF PROCEDURE ONLY; Future  - Lipid panel reflex to direct LDL Non-fasting  - Basic metabolic panel  (Ca, Cl, CO2, Creat, Gluc, K, Na, BUN)  - PSA, screen    2. Hyperlipidemia LDL goal <100: Previously elevated LDL in the 170s. This was back in 2009. No check since that time.  - Lipid panel reflex to direct LDL Non-fasting    3. Screening for diabetes mellitus:   - Basic metabolic panel  (Ca, Cl, CO2, Creat, Gluc, K, Na, BUN)    4. Special screening for malignant neoplasms, colon:   - GASTROENTEROLOGY ADULT REF PROCEDURE ONLY; Future    5. Screening for prostate cancer: After informed decision-making process patient elects to undergo prostate cancer screening with JUANITA and PSA screen.  - PSA, screen    Patient has been advised of split billing requirements and indicates understanding: Yes  COUNSELING:   Reviewed preventive health counseling, as reflected in patient instructions       Regular exercise       Healthy diet/nutrition       Vision screening       Hearing screening       Colon cancer screening       Prostate cancer screening    Estimated body mass index is 33.23 kg/m  as calculated from the following:    Height as of this encounter: 1.681 m (5' 6.18\").    Weight as of this encounter: 93.9 kg (207 lb).     Weight management plan: Discussed healthy diet and exercise guidelines    He reports that he has never smoked. He has never used smokeless tobacco.    Counseling Resources:  ATP IV Guidelines  Pooled Cohorts Equation Calculator  FRAX Risk Assessment  ICSI Preventive Guidelines  Dietary Guidelines for Americans, 2010  USDA's " MyPlate  ASA Prophylaxis  Lung CA Screening    Jaime Navarrete MD  [facility]    This chart is completed utilizing dictation software; typos and/or incorrect word substitutions may unintentionally occur.

## 2021-04-09 ENCOUNTER — MYC MEDICAL ADVICE (OUTPATIENT)
Dept: FAMILY MEDICINE | Facility: CLINIC | Age: 66
End: 2021-04-09

## 2021-04-09 ENCOUNTER — TELEPHONE (OUTPATIENT)
Dept: SURGERY | Facility: CLINIC | Age: 66
End: 2021-04-09

## 2021-04-09 NOTE — TELEPHONE ENCOUNTER
Location: [facility]  Is this a cancellation or reschedule? No  The name of the procedure: Colonoscopy  Provider scheduled with: [facility]  Date 5/17/21, Time 7:45am

## 2021-04-10 ENCOUNTER — MYC MEDICAL ADVICE (OUTPATIENT)
Dept: FAMILY MEDICINE | Facility: CLINIC | Age: 66
End: 2021-04-10

## 2021-05-02 DIAGNOSIS — Z11.59 ENCOUNTER FOR SCREENING FOR OTHER VIRAL DISEASES: Primary | ICD-10-CM

## 2021-05-10 RX ORDER — SODIUM, POTASSIUM,MAG SULFATES 17.5-3.13G
2 SOLUTION, RECONSTITUTED, ORAL ORAL SEE ADMIN INSTRUCTIONS
Qty: 354 ML | Refills: 0 | Status: ON HOLD | OUTPATIENT
Start: 2021-05-10 | End: 2022-10-06

## 2021-05-14 DIAGNOSIS — Z11.59 ENCOUNTER FOR SCREENING FOR OTHER VIRAL DISEASES: ICD-10-CM

## 2021-05-14 LAB
LABORATORY COMMENT REPORT: NORMAL
SARS-COV-2 RNA RESP QL NAA+PROBE: NEGATIVE
SARS-COV-2 RNA RESP QL NAA+PROBE: NORMAL
SPECIMEN SOURCE: NORMAL
SPECIMEN SOURCE: NORMAL

## 2021-05-14 PROCEDURE — U0005 INFEC AGEN DETEC AMPLI PROBE: HCPCS | Performed by: SURGERY

## 2021-05-14 PROCEDURE — U0003 INFECTIOUS AGENT DETECTION BY NUCLEIC ACID (DNA OR RNA); SEVERE ACUTE RESPIRATORY SYNDROME CORONAVIRUS 2 (SARS-COV-2) (CORONAVIRUS DISEASE [COVID-19]), AMPLIFIED PROBE TECHNIQUE, MAKING USE OF HIGH THROUGHPUT TECHNOLOGIES AS DESCRIBED BY CMS-2020-01-R: HCPCS | Performed by: SURGERY

## 2021-05-17 ENCOUNTER — HOSPITAL ENCOUNTER (OUTPATIENT)
Facility: AMBULATORY SURGERY CENTER | Age: 66
Discharge: HOME | End: 2021-05-17
Attending: SURGERY | Admitting: SURGERY
Payer: COMMERCIAL

## 2021-05-17 VITALS
SYSTOLIC BLOOD PRESSURE: 133 MMHG | OXYGEN SATURATION: 97 % | TEMPERATURE: 97.1 F | RESPIRATION RATE: 22 BRPM | HEART RATE: 70 BPM | DIASTOLIC BLOOD PRESSURE: 82 MMHG

## 2021-05-17 DIAGNOSIS — Z12.11 COLON CANCER SCREENING: Primary | ICD-10-CM

## 2021-05-17 LAB — COLONOSCOPY: NORMAL

## 2021-05-17 PROCEDURE — 45385 COLONOSCOPY W/LESION REMOVAL: CPT

## 2021-05-17 PROCEDURE — 45385 COLONOSCOPY W/LESION REMOVAL: CPT | Mod: PT | Performed by: SURGERY

## 2021-05-17 PROCEDURE — 99152 MOD SED SAME PHYS/QHP 5/>YRS: CPT | Mod: 59 | Performed by: SURGERY

## 2021-05-17 PROCEDURE — G8907 PT DOC NO EVENTS ON DISCHARG: HCPCS

## 2021-05-17 PROCEDURE — G8918 PT W/O PREOP ORDER IV AB PRO: HCPCS

## 2021-05-17 PROCEDURE — 88305 TISSUE EXAM BY PATHOLOGIST: CPT | Performed by: PATHOLOGY

## 2021-05-17 RX ORDER — NALOXONE HYDROCHLORIDE 0.4 MG/ML
0.2 INJECTION, SOLUTION INTRAMUSCULAR; INTRAVENOUS; SUBCUTANEOUS
Status: DISCONTINUED | OUTPATIENT
Start: 2021-05-17 | End: 2021-05-18 | Stop reason: HOSPADM

## 2021-05-17 RX ORDER — FLUMAZENIL 0.1 MG/ML
0.2 INJECTION, SOLUTION INTRAVENOUS
Status: ACTIVE | OUTPATIENT
Start: 2021-05-17 | End: 2021-05-17

## 2021-05-17 RX ORDER — NALOXONE HYDROCHLORIDE 0.4 MG/ML
0.4 INJECTION, SOLUTION INTRAMUSCULAR; INTRAVENOUS; SUBCUTANEOUS
Status: DISCONTINUED | OUTPATIENT
Start: 2021-05-17 | End: 2021-05-18 | Stop reason: HOSPADM

## 2021-05-17 RX ORDER — FENTANYL CITRATE 50 UG/ML
INJECTION, SOLUTION INTRAMUSCULAR; INTRAVENOUS PRN
Status: DISCONTINUED | OUTPATIENT
Start: 2021-05-17 | End: 2021-05-17 | Stop reason: HOSPADM

## 2021-05-17 RX ORDER — LIDOCAINE 40 MG/G
CREAM TOPICAL
Status: DISCONTINUED | OUTPATIENT
Start: 2021-05-17 | End: 2021-05-18 | Stop reason: HOSPADM

## 2021-05-17 RX ORDER — ONDANSETRON 4 MG/1
4 TABLET, ORALLY DISINTEGRATING ORAL EVERY 6 HOURS PRN
Status: DISCONTINUED | OUTPATIENT
Start: 2021-05-17 | End: 2021-05-18 | Stop reason: HOSPADM

## 2021-05-17 RX ORDER — ONDANSETRON 2 MG/ML
4 INJECTION INTRAMUSCULAR; INTRAVENOUS EVERY 6 HOURS PRN
Status: DISCONTINUED | OUTPATIENT
Start: 2021-05-17 | End: 2021-05-18 | Stop reason: HOSPADM

## 2021-05-17 RX ORDER — ONDANSETRON 2 MG/ML
4 INJECTION INTRAMUSCULAR; INTRAVENOUS
Status: DISCONTINUED | OUTPATIENT
Start: 2021-05-17 | End: 2021-05-18 | Stop reason: HOSPADM

## 2021-05-17 RX ORDER — PROCHLORPERAZINE MALEATE 10 MG
10 TABLET ORAL EVERY 6 HOURS PRN
Status: DISCONTINUED | OUTPATIENT
Start: 2021-05-17 | End: 2021-05-18 | Stop reason: HOSPADM

## 2021-05-19 LAB — COPATH REPORT: NORMAL

## 2021-10-23 ENCOUNTER — HEALTH MAINTENANCE LETTER (OUTPATIENT)
Age: 66
End: 2021-10-23

## 2022-06-04 ENCOUNTER — HEALTH MAINTENANCE LETTER (OUTPATIENT)
Age: 67
End: 2022-06-04

## 2022-10-03 ENCOUNTER — VIRTUAL VISIT (OUTPATIENT)
Dept: FAMILY MEDICINE | Facility: CLINIC | Age: 67
End: 2022-10-03
Payer: COMMERCIAL

## 2022-10-03 DIAGNOSIS — U07.1 INFECTION DUE TO 2019 NOVEL CORONAVIRUS: Primary | ICD-10-CM

## 2022-10-03 PROCEDURE — 99213 OFFICE O/P EST LOW 20 MIN: CPT | Mod: 95 | Performed by: FAMILY MEDICINE

## 2022-10-03 NOTE — PROGRESS NOTES
Aníbal is a 67 year old who is being evaluated via a billable video visit.  Video dysfunctional.  Converted to telephone visit    How would you like to obtain your AVS? MyChart  If the video visit is dropped, the invitation should be resent by: Text to cell phone: [phone]  Will anyone else be joining your video visit? No      Assessment & Plan   Problem List Items Addressed This Visit     Infection due to 2019 novel coronavirus - Primary     Discussed treatment isolation masking future immunizations           Relevant Medications    nirmatrelvir and ritonavir (PAXLOVID) therapy pack                        No follow-ups on file.   Follow-up Visit   Expected date:  Dec 03, 2022 (Approximate)      Follow Up Appointment Details:     Follow-up with whom?: PCP    Follow-Up for what?: Medicare Wellness    Welcome or Annual?: Annual Wellness    How?: In Person    Is this an as-needed follow-up?: No                    Pepe Irwin MD  [facility]    Subjective   Aníbal is a 67 year old, presenting for the following health issues:  Covid Concern    HPI     COVID-19 Symptom Review  How many days ago did these symptoms start? Saturday    Are any of the following symptoms significant for you?    New or worsening difficulty breathing? No    Worsening cough? Yes, I am coughing up mucus once in awhilr    Fever or chills? Yes, I felt feverish or had chills.    Headache: YES    Sore throat: YES    Chest pain: No    Diarrhea: No    Body aches? YES- real bad Saturday and sunday    What treatments has patient tried? Asa, tylenol and suddafed   Does patient live in a nursing home, group home, or shelter? YES  Does patient have a way to get food/medications during quarantined? Yes, I have a friend or family member who can help me.                  Review of Systems   As described      Objective    Vitals - Patient Reported  Weight (Patient Reported): 86.2 kg (190 lb)  Height (Patient Reported): 170.2 cm  "(5' 7\")  BMI (Based on Pt Reported Ht/Wt): 29.76    Vitals:  No vitals were obtained today due to virtual visit.    Physical Exam   No respiratory abnormalities heard                Video-Visit Details        Telephone visit.  10 minutes    Pepe Irwin MD      "

## 2022-10-03 NOTE — PATIENT INSTRUCTIONS
[facility]: [phone] (M-F 9a-5p)    Isolate for 5 days total  You can go out and about with a mask for 5 days after that  Get the updated booster after 2 months

## 2022-10-04 ENCOUNTER — APPOINTMENT (OUTPATIENT)
Dept: MRI IMAGING | Facility: CLINIC | Age: 67
End: 2022-10-04
Attending: EMERGENCY MEDICINE
Payer: COMMERCIAL

## 2022-10-04 ENCOUNTER — APPOINTMENT (OUTPATIENT)
Dept: CT IMAGING | Facility: CLINIC | Age: 67
End: 2022-10-04
Attending: EMERGENCY MEDICINE
Payer: COMMERCIAL

## 2022-10-04 ENCOUNTER — HOSPITAL ENCOUNTER (EMERGENCY)
Facility: CLINIC | Age: 67
Discharge: ANOTHER HOSPITAL | End: 2022-10-05
Attending: EMERGENCY MEDICINE | Admitting: EMERGENCY MEDICINE
Payer: COMMERCIAL

## 2022-10-04 DIAGNOSIS — U07.1 INFECTION DUE TO 2019 NOVEL CORONAVIRUS: ICD-10-CM

## 2022-10-04 DIAGNOSIS — H53.9 VISION CHANGES: ICD-10-CM

## 2022-10-04 DIAGNOSIS — H53.9 MONOCULAR VISUAL DISTURBANCE: ICD-10-CM

## 2022-10-04 LAB
ANION GAP SERPL CALCULATED.3IONS-SCNC: 2 MMOL/L (ref 3–14)
APTT PPP: 26 SECONDS (ref 22–38)
BASOPHILS # BLD AUTO: 0 10E3/UL (ref 0–0.2)
BASOPHILS NFR BLD AUTO: 1 %
BUN SERPL-MCNC: 17 MG/DL (ref 7–30)
CALCIUM SERPL-MCNC: 8.1 MG/DL (ref 8.5–10.1)
CHLORIDE BLD-SCNC: 106 MMOL/L (ref 94–109)
CO2 SERPL-SCNC: 29 MMOL/L (ref 20–32)
CREAT SERPL-MCNC: 0.77 MG/DL (ref 0.66–1.25)
CRP SERPL-MCNC: 13.2 MG/L (ref 0–8)
EOSINOPHIL # BLD AUTO: 0.2 10E3/UL (ref 0–0.7)
EOSINOPHIL NFR BLD AUTO: 6 %
ERYTHROCYTE [DISTWIDTH] IN BLOOD BY AUTOMATED COUNT: 12.8 % (ref 10–15)
ERYTHROCYTE [SEDIMENTATION RATE] IN BLOOD BY WESTERGREN METHOD: 10 MM/HR (ref 0–20)
FLUAV RNA SPEC QL NAA+PROBE: NEGATIVE
FLUBV RNA RESP QL NAA+PROBE: NEGATIVE
GFR SERPL CREATININE-BSD FRML MDRD: >90 ML/MIN/1.73M2
GLUCOSE BLD-MCNC: 90 MG/DL (ref 70–99)
GLUCOSE BLDC GLUCOMTR-MCNC: 118 MG/DL (ref 70–99)
HCT VFR BLD AUTO: 43.9 % (ref 40–53)
HGB BLD-MCNC: 15 G/DL (ref 13.3–17.7)
IMM GRANULOCYTES # BLD: 0 10E3/UL
IMM GRANULOCYTES NFR BLD: 0 %
INR PPP: 0.99 (ref 0.85–1.15)
LYMPHOCYTES # BLD AUTO: 1.5 10E3/UL (ref 0.8–5.3)
LYMPHOCYTES NFR BLD AUTO: 34 %
MCH RBC QN AUTO: 30.9 PG (ref 26.5–33)
MCHC RBC AUTO-ENTMCNC: 34.2 G/DL (ref 31.5–36.5)
MCV RBC AUTO: 90 FL (ref 78–100)
MONOCYTES # BLD AUTO: 0.8 10E3/UL (ref 0–1.3)
MONOCYTES NFR BLD AUTO: 19 %
NEUTROPHILS # BLD AUTO: 1.8 10E3/UL (ref 1.6–8.3)
NEUTROPHILS NFR BLD AUTO: 40 %
NRBC # BLD AUTO: 0 10E3/UL
NRBC BLD AUTO-RTO: 0 /100
PLATELET # BLD AUTO: 211 10E3/UL (ref 150–450)
POTASSIUM BLD-SCNC: 3.9 MMOL/L (ref 3.4–5.3)
RBC # BLD AUTO: 4.86 10E6/UL (ref 4.4–5.9)
SARS-COV-2 RNA RESP QL NAA+PROBE: POSITIVE
SODIUM SERPL-SCNC: 137 MMOL/L (ref 133–144)
TROPONIN I SERPL HS-MCNC: 6 NG/L
WBC # BLD AUTO: 4.4 10E3/UL (ref 4–11)

## 2022-10-04 PROCEDURE — 250N000009 HC RX 250: Performed by: EMERGENCY MEDICINE

## 2022-10-04 PROCEDURE — C9803 HOPD COVID-19 SPEC COLLECT: HCPCS | Performed by: EMERGENCY MEDICINE

## 2022-10-04 PROCEDURE — 85652 RBC SED RATE AUTOMATED: CPT | Performed by: EMERGENCY MEDICINE

## 2022-10-04 PROCEDURE — 99207 PR SERVICE NOT STAFFED W/SUPERV PROV: CPT | Performed by: STUDENT IN AN ORGANIZED HEALTH CARE EDUCATION/TRAINING PROGRAM

## 2022-10-04 PROCEDURE — 85025 COMPLETE CBC W/AUTO DIFF WBC: CPT | Performed by: EMERGENCY MEDICINE

## 2022-10-04 PROCEDURE — 250N000009 HC RX 250: Performed by: FAMILY MEDICINE

## 2022-10-04 PROCEDURE — 250N000011 HC RX IP 250 OP 636: Performed by: EMERGENCY MEDICINE

## 2022-10-04 PROCEDURE — 93010 ELECTROCARDIOGRAM REPORT: CPT | Performed by: EMERGENCY MEDICINE

## 2022-10-04 PROCEDURE — 70450 CT HEAD/BRAIN W/O DYE: CPT

## 2022-10-04 PROCEDURE — 99285 EMERGENCY DEPT VISIT HI MDM: CPT | Mod: CS,25 | Performed by: EMERGENCY MEDICINE

## 2022-10-04 PROCEDURE — 99285 EMERGENCY DEPT VISIT HI MDM: CPT | Mod: CS | Performed by: EMERGENCY MEDICINE

## 2022-10-04 PROCEDURE — 70496 CT ANGIOGRAPHY HEAD: CPT

## 2022-10-04 PROCEDURE — 84484 ASSAY OF TROPONIN QUANT: CPT | Performed by: EMERGENCY MEDICINE

## 2022-10-04 PROCEDURE — 86140 C-REACTIVE PROTEIN: CPT | Performed by: EMERGENCY MEDICINE

## 2022-10-04 PROCEDURE — 80048 BASIC METABOLIC PNL TOTAL CA: CPT | Performed by: EMERGENCY MEDICINE

## 2022-10-04 PROCEDURE — 70498 CT ANGIOGRAPHY NECK: CPT

## 2022-10-04 PROCEDURE — 96374 THER/PROPH/DIAG INJ IV PUSH: CPT | Mod: 59 | Performed by: EMERGENCY MEDICINE

## 2022-10-04 PROCEDURE — 70551 MRI BRAIN STEM W/O DYE: CPT

## 2022-10-04 PROCEDURE — 85730 THROMBOPLASTIN TIME PARTIAL: CPT | Performed by: EMERGENCY MEDICINE

## 2022-10-04 PROCEDURE — 250N000013 HC RX MED GY IP 250 OP 250 PS 637: Performed by: EMERGENCY MEDICINE

## 2022-10-04 PROCEDURE — 87636 SARSCOV2 & INF A&B AMP PRB: CPT | Performed by: EMERGENCY MEDICINE

## 2022-10-04 PROCEDURE — 85610 PROTHROMBIN TIME: CPT | Performed by: EMERGENCY MEDICINE

## 2022-10-04 PROCEDURE — 250N000013 HC RX MED GY IP 250 OP 250 PS 637: Performed by: FAMILY MEDICINE

## 2022-10-04 PROCEDURE — 93005 ELECTROCARDIOGRAM TRACING: CPT | Performed by: EMERGENCY MEDICINE

## 2022-10-04 PROCEDURE — 36415 COLL VENOUS BLD VENIPUNCTURE: CPT | Performed by: EMERGENCY MEDICINE

## 2022-10-04 RX ORDER — ASPIRIN 81 MG/1
325 TABLET, CHEWABLE ORAL ONCE
Status: COMPLETED | OUTPATIENT
Start: 2022-10-04 | End: 2022-10-04

## 2022-10-04 RX ORDER — IOPAMIDOL 755 MG/ML
500 INJECTION, SOLUTION INTRAVASCULAR ONCE
Status: COMPLETED | OUTPATIENT
Start: 2022-10-04 | End: 2022-10-04

## 2022-10-04 RX ORDER — TETRACAINE HYDROCHLORIDE 5 MG/ML
1-2 SOLUTION OPHTHALMIC ONCE
Status: COMPLETED | OUTPATIENT
Start: 2022-10-04 | End: 2022-10-04

## 2022-10-04 RX ADMIN — IOPAMIDOL 70 ML: 755 INJECTION, SOLUTION INTRAVENOUS at 16:38

## 2022-10-04 RX ADMIN — BENZOCAINE 6 MG-MENTHOL 10 MG LOZENGES 1 LOZENGE: at 22:50

## 2022-10-04 RX ADMIN — SODIUM CHLORIDE 100 ML: 9 INJECTION, SOLUTION INTRAVENOUS at 16:38

## 2022-10-04 RX ADMIN — TETRACAINE HYDROCHLORIDE 1 DROP: 5 SOLUTION OPHTHALMIC at 22:41

## 2022-10-04 RX ADMIN — ASPIRIN 81 MG CHEWABLE TABLET 324 MG: 81 TABLET CHEWABLE at 19:28

## 2022-10-04 RX ADMIN — PROCHLORPERAZINE EDISYLATE 10 MG: 5 INJECTION INTRAMUSCULAR; INTRAVENOUS at 20:24

## 2022-10-04 ASSESSMENT — ACTIVITIES OF DAILY LIVING (ADL)
ADLS_ACUITY_SCORE: 35

## 2022-10-04 NOTE — ED TRIAGE NOTES
Pt presents with right eye pain and vision change at 1200. Pt states he saw gray curtain across eye. Pt alert and orientated. Pt states 1.5 hours ago eye feels fine. Pt mentions he did test POSITIVE for covid on Saturday.      Triage Assessment     Row Name 10/04/22 [id number]       Triage Assessment (Adult)    Airway WDL WDL       Respiratory WDL    Respiratory WDL WDL       Skin Circulation/Temperature WDL    Skin Circulation/Temperature WDL WDL       Cardiac WDL    Cardiac WDL WDL       Peripheral/Neurovascular WDL    Peripheral Neurovascular WDL WDL       Cognitive/Neuro/Behavioral WDL    Cognitive/Neuro/Behavioral WDL WDL

## 2022-10-04 NOTE — CONSULTS
"      [facility]    Stroke Telephone Note    I was called by Salvador Carcamo on 10/04/22 regarding patient Aníbal Goldstein. The patient is a 67 year old male with no vascular risk factors that we know of. Patient describes visual changes that took place today noon time in right eye, described as curtain coming down and whole vision in right eye was obscured. This was transient episode of 2 hours then resolved and now at baseline. No associated focal weakness or numbness or language issues.    Imaging Findings   IMPRESSION:   HEAD CT:  1.  No acute intracranial process.  2.  Incidentally noted right middle cranial fossa arachnoid cyst.  3.  Moderate mucosal thickening of the ethmoid air cells and left frontal sinus.     HEAD CTA:   1.  Normal CTA [place].     NECK CTA:    Impression  Transient episode of mono ocular vision loss    Duration 2.5 hours. Description: Curtain gradually coming down right eye obscuring whole vision. Then gradually resolving and back at baseline with no deficits.    Recommendations   Please admit patient for observation  Aspirin 325 mg now  Please obtain brain MRI  Please obtain TTE  Please do neuro checks q 4 hours  Normo tension blood pressure goals   Sugar less than 180  Please place tel consult for patient to be seen tomorrow by tele stroke    My recommendations are based on the information provided over the phone by Aníbal Goldstein's in-person providers. They are not intended to replace the clinical judgment of his in-person providers. I was not requested to personally see or examine the patient at this time.    The Stroke Staff is Dr. Loera.    Belinda Bradshaw MD  Vascular Neurology Fellow  To page me or covering stroke neurology team member, click here: AMCOM   Choose \"On Call\" tab at top, then search dropdown box for \"Neurology Adult\", select location, press Enter, then look for stroke/neuro ICU/telestroke.      "

## 2022-10-04 NOTE — ED NOTES
Patient reports approximately 2 hours of R eye problems, started around 1200 and lasted until 1400. Vision back to normal as of now. No neuro deficits noted at this time. Covid positive x 3 days.

## 2022-10-04 NOTE — ED PROVIDER NOTES
History     Chief Complaint   Patient presents with     Vision Changes Od     HPI  Aníbal Goldstein is a 67 year old male who presents for evaluation of a visual change.  At noon today he started to develop some right periorbital discomfort.  He then went on to have visual changes.  He states it felt like up gray curtain came over his visual field in the right eye.  This continued for about 2 to 2-1/2 hours he reports.  He could barely see a small rim inferiorly out of the right eye.  Vision of left eye was normal.  No speech difficulties.  Arms and legs have worked fine.  No history of stroke.  Vision is back to normal now.    Also with recent COVID.  4 days ago he began symptoms with subjective fevers, cough and malaise.  Tested +3 days ago.    Allergies:  Allergies   Allergen Reactions     No Known Allergies        Problem List:    Patient Active Problem List    Diagnosis Date Noted     Infection due to 2019 novel coronavirus 10/03/2022     Priority: Medium     CARDIOVASCULAR SCREENING; LDL GOAL LESS THAN 160 10/31/2010     Priority: Medium     Pain in joint, shoulder region      Priority: Medium     Cervicalgia      Priority: Medium        Past Medical History:    Past Medical History:   Diagnosis Date     Cervicalgia 1/2008     Pain in joint, shoulder region 1/2008       Past Surgical History:    Past Surgical History:   Procedure Laterality Date     COLONOSCOPY       COLONOSCOPY WITH CO2 INSUFFLATION N/A 5/17/2021    Procedure: COLONOSCOPY, WITH CO2 INSUFFLATION;  Surgeon: Elder Bates DO;  Location: [facility] OR     HC KNEE SCOPE,MED/LAT MENISECTOMY  2/9/2009    Right knee     HERNIA REPAIR, INGUINAL RT/LT      lt ?     RELEASE CARPAL TUNNEL Right 3/1/2018    Procedure: RELEASE CARPAL TUNNEL;  Right carpal tunnel release;  Surgeon: Britney Delgado MD;  Location: [facility] OR     ROTATOR CUFF REPAIR RT/LT  02/18/10    Right side. Done at [facility].       Family History:    Family History   Problem  Relation Age of Onset     Diabetes Father         Adult onset     Alzheimer Disease Father      Alzheimer Disease Mother      Colon Cancer No family hx of      Prostate Cancer No family hx of      Breast Cancer No family hx of      Ulcerative Colitis No family hx of      Crohn's Disease No family hx of        Social History:  Marital Status:   [2]  Social History     Tobacco Use     Smoking status: Never Smoker     Smokeless tobacco: Never Used   Vaping Use     Vaping Use: Never used   Substance Use Topics     Alcohol use: Yes     Alcohol/week: 1.7 standard drinks     Comment: 0-1 per week      Drug use: No        Medications:    Acetaminophen (TYLENOL PO)  Na Sulfate-K Sulfate-Mg Sulf (SUPREP BOWEL PREP KIT) solution  nirmatrelvir and ritonavir (PAXLOVID) therapy pack  Simethicone 125 MG TABS  spacer (OPTICHAMBER FOX) holding chamber          Review of Systems  All other systems are reviewed and are negative    Physical Exam   BP: 124/63  Pulse: 79  Temp: 98.5  F (36.9  C)  Resp: 16  Weight: 96.2 kg (212 lb)  SpO2: 97 %      Physical Exam  Vitals and nursing note reviewed.   Constitutional:       General: He is not in acute distress.     Appearance: He is well-developed. He is not diaphoretic.   HENT:      Head: Normocephalic and atraumatic.   Eyes:      General: No scleral icterus.        Right eye: No discharge.         Left eye: No discharge.      Conjunctiva/sclera: Conjunctivae normal.   Cardiovascular:      Rate and Rhythm: Normal rate and regular rhythm.      Heart sounds: Normal heart sounds. No murmur heard.  Pulmonary:      Effort: Pulmonary effort is normal. No respiratory distress.      Breath sounds: Normal breath sounds. No stridor.   Abdominal:      Palpations: Abdomen is soft.      Tenderness: There is no abdominal tenderness.   Musculoskeletal:         General: Normal range of motion.      Cervical back: Normal range of motion and neck supple.   Skin:     General: Skin is warm and dry.       Coloration: Skin is not pale.      Findings: No erythema or rash.   Neurological:      Mental Status: He is alert.      Cranial Nerves: No cranial nerve deficit.      Motor: No abnormal muscle tone.         ED Course              ED Course as of 10/04/22 2122   Tue Oct 04, 2022   1923 Patient reporting recurrence of some initial cloudy upper vision out of the right eye like things started earlier today at noon.  No other changes currently.  Have ordered aspirin and MRI.   1935 Case reviewed with Dr. Gamez with the stroke neurology service to update on the recurrence of visual changes.  She recommended an ESR and CRP to rule out things like giant cell arteritis.  Did not recommend any further treatment at this time.  We will continue with the plan for MRI and neurochecks.   2022 Upon returning from MRI, patient reports vision more dark in upper quadrants.  No quadrant deficit appreciated with finger wiggle in quadrants at this time.      Procedures          EKG: Normal sinus rhythm, normal axis.  Rate of 72 beats per minute.  No acute ST or T wave changes.  Interpreted by myself.      Critical Care time:  none               Results for orders placed or performed during the hospital encounter of 10/04/22 (from the past 24 hour(s))   Glucose by meter   Result Value Ref Range    GLUCOSE BY METER POCT 118 (H) 70 - 99 mg/dL   CBC with Platelets & Differential    Narrative    The following orders were created for panel order CBC with Platelets & Differential.  Procedure                               Abnormality         Status                     ---------                               -----------         ------                     CBC with platelets and d...[[id number]]                      Final result                 Please view results for these tests on the individual orders.   Basic metabolic panel   Result Value Ref Range    Sodium 137 133 - 144 mmol/L    Potassium 3.9 3.4 - 5.3 mmol/L    Chloride 106 94 - 109  mmol/L    Carbon Dioxide (CO2) 29 20 - 32 mmol/L    Anion Gap 2 (L) 3 - 14 mmol/L    Urea Nitrogen 17 7 - 30 mg/dL    Creatinine 0.77 0.66 - 1.25 mg/dL    Calcium 8.1 (L) 8.5 - 10.1 mg/dL    Glucose 90 70 - 99 mg/dL    GFR Estimate >90 >60 mL/min/1.73m2   INR   Result Value Ref Range    INR 0.99 0.85 - 1.15   Partial thromboplastin time   Result Value Ref Range    aPTT 26 22 - 38 Seconds   Troponin I   Result Value Ref Range    Troponin I High Sensitivity 6 <79 ng/L   CBC with platelets and differential   Result Value Ref Range    WBC Count 4.4 4.0 - 11.0 10e3/uL    RBC Count 4.86 4.40 - 5.90 10e6/uL    Hemoglobin 15.0 13.3 - 17.7 g/dL    Hematocrit 43.9 40.0 - 53.0 %    MCV 90 78 - 100 fL    MCH 30.9 26.5 - 33.0 pg    MCHC 34.2 31.5 - 36.5 g/dL    RDW 12.8 10.0 - 15.0 %    Platelet Count 211 150 - 450 10e3/uL    % Neutrophils 40 %    % Lymphocytes 34 %    % Monocytes 19 %    % Eosinophils 6 %    % Basophils 1 %    % Immature Granulocytes 0 %    NRBCs per 100 WBC 0 <1 /100    Absolute Neutrophils 1.8 1.6 - 8.3 10e3/uL    Absolute Lymphocytes 1.5 0.8 - 5.3 10e3/uL    Absolute Monocytes 0.8 0.0 - 1.3 10e3/uL    Absolute Eosinophils 0.2 0.0 - 0.7 10e3/uL    Absolute Basophils 0.0 0.0 - 0.2 10e3/uL    Absolute Immature Granulocytes 0.0 <=0.4 10e3/uL    Absolute NRBCs 0.0 10e3/uL   Erythrocyte sedimentation rate auto   Result Value Ref Range    Erythrocyte Sedimentation Rate 10 0 - 20 mm/hr   CRP inflammation   Result Value Ref Range    CRP Inflammation 13.2 (H) 0.0 - 8.0 mg/L   CT Head w/o Contrast    Narrative    EXAM: CT HEAD W/O CONTRAST, CTA HEAD NECK W CONTRAST  LOCATION: [facility]  DATE/TIME: 10/4/2022 4:53 PM    INDICATION: visual changes  COMPARISON: None.  CONTRAST: 70ml isovue 370  TECHNIQUE: Head and neck CT angiogram with IV contrast. Noncontrast head CT followed by axial helical CT images of the head and neck vessels obtained during the arterial phase of intravenous contrast  administration. Axial 2D reconstructed images and   multiplanar 3D MIP reconstructed images of the head and neck vessels were performed by the technologist. Dose reduction techniques were used. All stenosis measurements made according to NASCET criteria unless otherwise specified.    FINDINGS:   NONCONTRAST HEAD CT:   INTRACRANIAL CONTENTS: No intracranial hemorrhage, extraaxial collection, or mass effect.  No CT evidence of acute infarct. Normal parenchymal attenuation. Normal ventricles. 3.4 x 4.1 cm fluid density collection at the right middle cranial fossa likely   represents arachnoid cyst. There is mild mass effect upon the underlying right temporal lobe.    VISUALIZED ORBITS/SINUSES/MASTOIDS: No intraorbital abnormality. Moderate mucosal thickening scattered about the paranasal sinuses. No middle ear or mastoid effusion.    BONES/SOFT TISSUES: No acute abnormality.    HEAD CTA:  ANTERIOR CIRCULATION: No stenosis/occlusion, aneurysm, or high flow vascular malformation. Standard [place] anatomy.    POSTERIOR CIRCULATION: No stenosis/occlusion, aneurysm, or high flow vascular malformation. Balanced vertebral arteries supply a normal basilar artery.     DURAL VENOUS SINUSES: Expected enhancement of the major dural venous sinuses.    NECK CTA:  RIGHT CAROTID: Atherosclerotic plaque results in less than 50% stenosis in the right ICA. No dissection.    LEFT CAROTID: Atherosclerotic plaque results in less than 50% stenosis in the left ICA. No dissection.    VERTEBRAL ARTERIES: No focal stenosis or dissection. Balanced vertebral arteries.    AORTIC ARCH: Classic aortic arch anatomy with no significant stenosis at the origin of the great vessels.    NONVASCULAR STRUCTURES: Unremarkable.      Impression    IMPRESSION:   HEAD CT:  1.  No acute intracranial process.  2.  Incidentally noted right middle cranial fossa arachnoid cyst.  3.  Moderate mucosal thickening of the ethmoid air cells and left frontal  sinus.    HEAD CTA:   1.  Normal CTA [place].    NECK CTA:  1.  Normal neck CTA.   CTA Head Neck with Contrast    Narrative    EXAM: CT HEAD W/O CONTRAST, CTA HEAD NECK W CONTRAST  LOCATION: [facility]  DATE/TIME: 10/4/2022 4:53 PM    INDICATION: visual changes  COMPARISON: None.  CONTRAST: 70ml isovue 370  TECHNIQUE: Head and neck CT angiogram with IV contrast. Noncontrast head CT followed by axial helical CT images of the head and neck vessels obtained during the arterial phase of intravenous contrast administration. Axial 2D reconstructed images and   multiplanar 3D MIP reconstructed images of the head and neck vessels were performed by the technologist. Dose reduction techniques were used. All stenosis measurements made according to NASCET criteria unless otherwise specified.    FINDINGS:   NONCONTRAST HEAD CT:   INTRACRANIAL CONTENTS: No intracranial hemorrhage, extraaxial collection, or mass effect.  No CT evidence of acute infarct. Normal parenchymal attenuation. Normal ventricles. 3.4 x 4.1 cm fluid density collection at the right middle cranial fossa likely   represents arachnoid cyst. There is mild mass effect upon the underlying right temporal lobe.    VISUALIZED ORBITS/SINUSES/MASTOIDS: No intraorbital abnormality. Moderate mucosal thickening scattered about the paranasal sinuses. No middle ear or mastoid effusion.    BONES/SOFT TISSUES: No acute abnormality.    HEAD CTA:  ANTERIOR CIRCULATION: No stenosis/occlusion, aneurysm, or high flow vascular malformation. Standard [place] anatomy.    POSTERIOR CIRCULATION: No stenosis/occlusion, aneurysm, or high flow vascular malformation. Balanced vertebral arteries supply a normal basilar artery.     DURAL VENOUS SINUSES: Expected enhancement of the major dural venous sinuses.    NECK CTA:  RIGHT CAROTID: Atherosclerotic plaque results in less than 50% stenosis in the right ICA. No dissection.    LEFT CAROTID:  Atherosclerotic plaque results in less than 50% stenosis in the left ICA. No dissection.    VERTEBRAL ARTERIES: No focal stenosis or dissection. Balanced vertebral arteries.    AORTIC ARCH: Classic aortic arch anatomy with no significant stenosis at the origin of the great vessels.    NONVASCULAR STRUCTURES: Unremarkable.      Impression    IMPRESSION:   HEAD CT:  1.  No acute intracranial process.  2.  Incidentally noted right middle cranial fossa arachnoid cyst.  3.  Moderate mucosal thickening of the ethmoid air cells and left frontal sinus.    HEAD CTA:   1.  Normal CTA [place].    NECK CTA:  1.  Normal neck CTA.   MR Brain w/o Contrast    Narrative    EXAM: MR BRAIN W/O CONTRAST  LOCATION: [facility]  DATE/TIME: 10/4/2022 8:11 PM    INDICATION: Right-sided visual changes.  COMPARISON: CT head without contrast 10/04/2022.  TECHNIQUE: Routine multiplanar multisequence head MRI without intravenous contrast.    FINDINGS:  INTRACRANIAL CONTENTS: No acute or subacute infarct. There is an area of central T1 hyperintensity and peripheral T2 hypointensity with associated blooming artifact on gradient echo imaging along the superior aspect of the left cerebellar hemisphere.   There is a CSF signal region along the anterior right middle cranial fossa, most consistent with an arachnoid cyst. Scattered nonspecific T2/FLAIR hyperintensities within the cerebral white matter most consistent with mild chronic microvascular ischemic   change. Mild generalized cerebral atrophy. No hydrocephalus. Mild cerebellar atrophy.     SELLA: No abnormality accounting for technique.    OSSEOUS STRUCTURES/SOFT TISSUES: Normal marrow signal. The major intracranial vascular flow voids are maintained.     ORBITS: No abnormality accounting for technique.     SINUSES/MASTOIDS: Mild mucosal thickening scattered about the paranasal sinuses. No middle ear or mastoid effusion.       Impression     IMPRESSION:  1.  No acute infarct or intracranial hemorrhage.  2.  T2 heterogeneous focus with associated susceptibility blooming artifact on gradient echo imaging, most consistent with a cavernous malformation.  3.  Arachnoid cyst along the right anterior cranial fossa.  4.  Generalized brain atrophy and presumed microvascular ischemic changes as detailed above.   Symptomatic; Unknown Influenza A/B & SARS-CoV2 (COVID-19) Virus PCR Multiplex Nose    Specimen: Nose; Swab   Result Value Ref Range    Influenza A PCR Negative Negative    Influenza B PCR Negative Negative    SARS CoV2 PCR Positive (A) Negative    Narrative    Testing was performed using the buck SARS-CoV-2 & Influenza A/B Assay on the buck [employer] System. This test should be ordered for the detection of SARS-CoV-2 and influenza viruses in individuals who meet clinical and/or epidemiological criteria. Test performance is unknown in asymptomatic patients. This test is for in vitro diagnostic use under the FDA EUA for laboratories certified under CLIA to perform moderate and/or high complexity testing. This test has not been FDA cleared or approved. A negative result does not rule out the presence of PCR inhibitors in the specimen or target RNA in concentration below the limit of detection for the assay. If only one viral target is positive but coinfection with multiple targets is suspected, the sample should be re-tested with another FDA cleared, approved or authorized test, if coinfection would change clinical management. [facility] are certified under the Clinical Laboratory Improvement Amendments of 1988 (CLIA-88) as  qualified to perform moderate and/or high complexity laboratory testing.       Medications   Saline 100mL Bag (100 mLs Intravenous Given 10/4/22 1638)   iopamidol (ISOVUE-370) solution 500 mL (70 mLs Intravenous Given 10/4/22 1638)   aspirin (ASA) chewable tablet 325 mg (324 mg Oral Given 10/4/22 1928)   prochlorperazine  (COMPAZINE) injection 10 mg (10 mg Intravenous Given 10/4/22 2024)       Assessments & Plan (with Medical Decision Making)  67-year-old male who presented with visual changes of the right eye as described above.  Symptoms had resolved by the time he initially came here.  Case was reviewed with [facility], who felt this did not sound at the time ophthalmologic and recommended pursuing stroke or other alternative diagnoses.  He does have current COVID of note.  CT CTA of the head and neck do not reveal any significant occlusive disease.  Stroke neurology was also consulted who initially recommended admission to observation status, stroke work-up and MRI.  Patient had recurrence of cloudy vision out of the upper portion of his right eye.  MRI revealed no acute stroke.  Stroke neurology, Dr. Elmore, called back and recommended transfer more acutely where ophthalmology could see the patient.  He is endorsing cloudy vision out of the upper visual fields of his right eye alone.  No other deficits at 9 PM on exam.  He is signed out to Dr. Pierre will attempt to find ophthalmological evaluation [facility].     I have reviewed the nursing notes.    I have reviewed the findings, diagnosis, plan and need for follow up with the patient.       New Prescriptions    No medications on file       Final diagnoses:   Vision changes   Infection due to 2019 novel coronavirus       10/4/2022   [facility] EMERGENCY DEPT     Salvador Carcamo MD  10/04/22 2124

## 2022-10-05 ENCOUNTER — HOSPITAL ENCOUNTER (EMERGENCY)
Facility: CLINIC | Age: 67
End: 2022-10-05
Attending: HOSPITALIST
Payer: COMMERCIAL

## 2022-10-05 ENCOUNTER — HOSPITAL ENCOUNTER (OUTPATIENT)
Facility: CLINIC | Age: 67
Setting detail: OBSERVATION
Discharge: HOME | End: 2022-10-06
Attending: EMERGENCY MEDICINE | Admitting: STUDENT IN AN ORGANIZED HEALTH CARE EDUCATION/TRAINING PROGRAM
Payer: COMMERCIAL

## 2022-10-05 ENCOUNTER — TELEPHONE (OUTPATIENT)
Dept: OPHTHALMOLOGY | Facility: CLINIC | Age: 67
End: 2022-10-05

## 2022-10-05 VITALS
SYSTOLIC BLOOD PRESSURE: 151 MMHG | HEART RATE: 83 BPM | DIASTOLIC BLOOD PRESSURE: 73 MMHG | BODY MASS INDEX: 34.03 KG/M2 | TEMPERATURE: 98.2 F | OXYGEN SATURATION: 97 % | WEIGHT: 212 LBS | RESPIRATION RATE: 16 BRPM

## 2022-10-05 DIAGNOSIS — E78.2 MIXED HYPERLIPIDEMIA: Primary | Chronic | ICD-10-CM

## 2022-10-05 DIAGNOSIS — I10 BENIGN ESSENTIAL HYPERTENSION: Chronic | ICD-10-CM

## 2022-10-05 DIAGNOSIS — H53.121 TRANSIENT VISUAL LOSS, RIGHT: ICD-10-CM

## 2022-10-05 LAB
CHOLEST SERPL-MCNC: 199 MG/DL
GLUCOSE BLDC GLUCOMTR-MCNC: 86 MG/DL (ref 70–99)
HBA1C MFR BLD: 5.9 %
HDLC SERPL-MCNC: 51 MG/DL
LDLC SERPL CALC-MCNC: 120 MG/DL
NONHDLC SERPL-MCNC: 148 MG/DL
TRIGL SERPL-MCNC: 139 MG/DL
TROPONIN T SERPL HS-MCNC: <6 NG/L

## 2022-10-05 PROCEDURE — 84484 ASSAY OF TROPONIN QUANT: CPT | Performed by: STUDENT IN AN ORGANIZED HEALTH CARE EDUCATION/TRAINING PROGRAM

## 2022-10-05 PROCEDURE — 84484 ASSAY OF TROPONIN QUANT: CPT

## 2022-10-05 PROCEDURE — 83036 HEMOGLOBIN GLYCOSYLATED A1C: CPT

## 2022-10-05 PROCEDURE — 80061 LIPID PANEL: CPT

## 2022-10-05 PROCEDURE — C9803 HOPD COVID-19 SPEC COLLECT: HCPCS | Performed by: EMERGENCY MEDICINE

## 2022-10-05 PROCEDURE — 999N000127 HC STATISTIC PERIPHERAL IV START W US GUIDANCE

## 2022-10-05 PROCEDURE — 250N000013 HC RX MED GY IP 250 OP 250 PS 637: Performed by: EMERGENCY MEDICINE

## 2022-10-05 PROCEDURE — 120N000002 HC R&B MED SURG/OB UMMC

## 2022-10-05 PROCEDURE — 99285 EMERGENCY DEPT VISIT HI MDM: CPT | Performed by: EMERGENCY MEDICINE

## 2022-10-05 PROCEDURE — 99285 EMERGENCY DEPT VISIT HI MDM: CPT | Mod: 25 | Performed by: EMERGENCY MEDICINE

## 2022-10-05 PROCEDURE — 36415 COLL VENOUS BLD VENIPUNCTURE: CPT

## 2022-10-05 RX ORDER — ACETAMINOPHEN 325 MG/1
650 TABLET ORAL EVERY 4 HOURS PRN
Status: DISCONTINUED | OUTPATIENT
Start: 2022-10-05 | End: 2022-10-06 | Stop reason: HOSPADM

## 2022-10-05 RX ORDER — LABETALOL HYDROCHLORIDE 5 MG/ML
10-20 INJECTION, SOLUTION INTRAVENOUS EVERY 10 MIN PRN
Status: DISCONTINUED | OUTPATIENT
Start: 2022-10-05 | End: 2022-10-06 | Stop reason: HOSPADM

## 2022-10-05 RX ORDER — LIDOCAINE 40 MG/G
CREAM TOPICAL
Status: DISCONTINUED | OUTPATIENT
Start: 2022-10-05 | End: 2022-10-06 | Stop reason: HOSPADM

## 2022-10-05 RX ORDER — ASPIRIN 81 MG/1
81 TABLET, CHEWABLE ORAL ONCE
Status: COMPLETED | OUTPATIENT
Start: 2022-10-05 | End: 2022-10-05

## 2022-10-05 RX ORDER — ASPIRIN 81 MG/1
81 TABLET ORAL DAILY
Status: DISCONTINUED | OUTPATIENT
Start: 2022-10-05 | End: 2022-10-06

## 2022-10-05 RX ORDER — HYDRALAZINE HYDROCHLORIDE 20 MG/ML
10-20 INJECTION INTRAMUSCULAR; INTRAVENOUS
Status: DISCONTINUED | OUTPATIENT
Start: 2022-10-05 | End: 2022-10-06 | Stop reason: HOSPADM

## 2022-10-05 RX ORDER — ASPIRIN 81 MG/1
81 TABLET, CHEWABLE ORAL DAILY
Status: DISCONTINUED | OUTPATIENT
Start: 2022-10-05 | End: 2022-10-06

## 2022-10-05 RX ADMIN — ASPIRIN 81 MG CHEWABLE TABLET 81 MG: 81 TABLET CHEWABLE at 20:30

## 2022-10-05 ASSESSMENT — ACTIVITIES OF DAILY LIVING (ADL)
DIFFICULTY_EATING/SWALLOWING: NO
ADLS_ACUITY_SCORE: 35
TOILETING_ISSUES: NO
CONCENTRATING,_REMEMBERING_OR_MAKING_DECISIONS_DIFFICULTY: NO
DOING_ERRANDS_INDEPENDENTLY_DIFFICULTY: NO
CHANGE_IN_FUNCTIONAL_STATUS_SINCE_ONSET_OF_CURRENT_ILLNESS/INJURY: NO
ADLS_ACUITY_SCORE: 35
ADLS_ACUITY_SCORE: 35
ADLS_ACUITY_SCORE: 33
DRESSING/BATHING_DIFFICULTY: NO
ADLS_ACUITY_SCORE: 35
FALL_HISTORY_WITHIN_LAST_SIX_MONTHS: NO
WEAR_GLASSES_OR_BLIND: NO
ADLS_ACUITY_SCORE: 18
ADLS_ACUITY_SCORE: 35
WALKING_OR_CLIMBING_STAIRS_DIFFICULTY: NO
ADLS_ACUITY_SCORE: 35

## 2022-10-05 ASSESSMENT — ENCOUNTER SYMPTOMS
CONSTITUTIONAL NEGATIVE: 1
RESPIRATORY NEGATIVE: 1
EYE PAIN: 0
GASTROINTESTINAL NEGATIVE: 1
CARDIOVASCULAR NEGATIVE: 1

## 2022-10-05 ASSESSMENT — VISUAL ACUITY
OU: BASELINE
OD: 20/40
OS: 20/40

## 2022-10-05 NOTE — ED PROVIDER NOTES
SIGN OUT NOTE    Patient signed out to me at change of shift by Dr. Pierre.  He has currently been in the ED for 16 hours.    This is a 67-year-old male who presented with right periorbital discomfort (described as a pressure) and vision changes (described as a thick fog over the right eye ) for 2 to 2-1/2 hours.    He has had a CT/CTA, MRI of the brain, stroke neuro has been involved.  He has received aspirin.  Initial plan was to discharge home but in the ED he had recurrence of symptoms that again lasted for about an hour.  Stroke neurology recommended patient be transferred to facility with ophthalmology availability.    Of note, patient also COVID-positive with symptoms starting 4 days ago, positive test 3 days ago.  Overnight, ocular pressures check were noted to be 14 on the right, 20 on the left.  Ocular ultrasound did not show any obvious abnormality.  He was given a dose of Compazine for possible ocular migraine.  Otherwise he was stable overnight.    Dr. Pierre spoke with Dr. Kauffman, hospitalist, [facility].  Patient has been accepted for transfer but we are awaiting bed availability.    The patient has been questioning if he needs to be transferred versus follow-up as an outpatient.  He has not had any further vision symptoms.  He still has a little discomfort around the eye.  He usually drinks caffeine so he is going to have some cola.    I spoke with ophthalmology at [facility].  They noted they would not be able to make a formal recommendation but would see the patient either in the ED or in the hospital if he was transferred.    I spoke with the ER at [facility].  I think it would be a good idea for the patient to be transferred to be seen by ophthalmology today in the ED and then potentially discharged for follow-up.  He could also be seen by neurology at the same time.  Patient was graciously excepted for transfer to the ED.  He is going to transfer via private car with his  wife.  He is completely stable.    Clinical impression:  (H53.9) Vision changes    (U07.1) Infection due to 2019 novel coronavirus    (H53.9) Monocular visual disturbance           Cecille Powers MD  10/05/22 1555

## 2022-10-05 NOTE — ED TRIAGE NOTES
Pt presents to ER with C/O sudden onset right eye pain, blurred vision followed by almost complete loss of eyesight yesterday around noon. Was evaluated at [facility] and told to come here for Neuro and Opthalmology consult.

## 2022-10-05 NOTE — TELEPHONE ENCOUNTER
Telephone Note    I was contacted by Dr. Chaves from [facility] regarding this patient on concern for right eye vision changes and pain around the right eye. The following information was obtained via phone call with this provider.      Patient is a 67-year-old male who presents with right eye vision change in which the vision became decreased like a curtain was over the vision or a grey spot. This lasted for 2.5 hours and then the vision returned to normal while in the ED. Patient underwent stroke workup with CT and MRI imaging during this time period which was negative. The patient had another episode while in the ED several hours later and this episode lasted 1.5 hours but has since resolved.    Patient's past ocular history:   Unknown    Patient's past medical history:   No relevant medical history       I conveyed to the consulting physician that I could provide some general thoughts regarding this patient but that a formal consult and evaluation would be necessary for me to provide an active role in the patient's care. Given the reported examination findings, I emphasized the importance of ophthalmology evaluation and I offered to see the patient in the ED today at [facility].I conveyed to the provider that if there was any concern about the patient's care or my suggestions, the patient should be sent to the ED for evaluation right away.      Following communication with the patient, the provider and the patient selected to transfer the patient to [facility] or [facility] for evaluation.    Yokasta Hernandez MD  Ophthalmology Resident, PGY-2

## 2022-10-05 NOTE — ED PROVIDER NOTES
ED Provider Note  [facility]      History     Chief Complaint   Patient presents with     Eye Pain     Loss of Vision     HPI  Aníbal Goldstein is a 67 year old male who was referred from outside hospital for transient vision loss in the right eye.  Does not have a history of glaucoma has had 2 spells that sent him to the local emergency department he did not have elevated IOP he did complain of some tenderness over the right temporal artery.  He had labs done yesterday and was diagnosed with Covid-19 his sed rate at that time was 10.  He was sent here to be seen by ophthalmology and neuro stroke.  Currently his vision is at normal baseline.    Past Medical History  Past Medical History:   Diagnosis Date     Cervicalgia 1/2008     Pain in joint, shoulder region 1/2008     Past Surgical History:   Procedure Laterality Date     COLONOSCOPY       COLONOSCOPY WITH CO2 INSUFFLATION N/A 5/17/2021    Procedure: COLONOSCOPY, WITH CO2 INSUFFLATION;  Surgeon: Elder Bates DO;  Location: [facility] OR     HC KNEE SCOPE,MED/LAT MENISECTOMY  2/9/2009    Right knee     HERNIA REPAIR, INGUINAL RT/LT      lt ?     RELEASE CARPAL TUNNEL Right 3/1/2018    Procedure: RELEASE CARPAL TUNNEL;  Right carpal tunnel release;  Surgeon: Britney Delgado MD;  Location: [facility] OR     ROTATOR CUFF REPAIR RT/LT  02/18/10    Right side. Done at [facility].     Acetaminophen (TYLENOL PO)  Na Sulfate-K Sulfate-Mg Sulf (SUPREP BOWEL PREP KIT) solution  nirmatrelvir and ritonavir (PAXLOVID) therapy pack  Simethicone 125 MG TABS  spacer (OPTICHAMBER FOX) holding chamber      Allergies   Allergen Reactions     No Known Allergies      Family History  Family History   Problem Relation Age of Onset     Diabetes Father         Adult onset     Alzheimer Disease Father      Alzheimer Disease Mother      Colon Cancer No family hx of      Prostate Cancer No family hx of      Breast Cancer No family hx of       "Ulcerative Colitis No family hx of      Crohn's Disease No family hx of      Social History   Social History     Tobacco Use     Smoking status: Never Smoker     Smokeless tobacco: Never Used   Vaping Use     Vaping Use: Never used   Substance Use Topics     Alcohol use: Yes     Alcohol/week: 1.7 standard drinks     Comment: 0-1 per week      Drug use: No      Past medical history, past surgical history, medications, allergies, family history, and social history were reviewed with the patient. No additional pertinent items.       Review of Systems   Constitutional: Negative.    HENT: Negative.    Eyes: Positive for visual disturbance. Negative for pain.   Respiratory: Negative.    Cardiovascular: Negative.    Gastrointestinal: Negative.    All other systems reviewed and are negative.    A complete review of systems was performed with pertinent positives and negatives noted in the HPI, and all other systems negative.    Physical Exam   BP: (!) 161/99  Pulse: 82  Temp: 97.9  F (36.6  C)  Resp: 16  Height: 170.2 cm (5' 7\")  Weight: 98 kg (216 lb)  SpO2: 96 %  Physical Exam  Vitals and nursing note reviewed.   Constitutional:       General: He is not in acute distress.  Eyes:      Extraocular Movements: Extraocular movements intact.      Pupils: Pupils are equal, round, and reactive to light.      Comments: No temporal artery tenderness   Cardiovascular:      Rate and Rhythm: Normal rate and regular rhythm.   Musculoskeletal:      Cervical back: Neck supple.   Neurological:      General: No focal deficit present.      Mental Status: He is alert and oriented to person, place, and time.      Cranial Nerves: No cranial nerve deficit.   Psychiatric:         Mood and Affect: Mood normal.         Behavior: Behavior normal.         ED Course      Procedures       Ophthalmology and neuro stroke are aware              Results for orders placed or performed during the hospital encounter of 10/04/22   CT Head w/o Contrast     " Status: None    Narrative    EXAM: CT HEAD W/O CONTRAST, CTA HEAD NECK W CONTRAST  LOCATION: [facility]  DATE/TIME: 10/4/2022 4:53 PM    INDICATION: visual changes  COMPARISON: None.  CONTRAST: 70ml isovue 370  TECHNIQUE: Head and neck CT angiogram with IV contrast. Noncontrast head CT followed by axial helical CT images of the head and neck vessels obtained during the arterial phase of intravenous contrast administration. Axial 2D reconstructed images and   multiplanar 3D MIP reconstructed images of the head and neck vessels were performed by the technologist. Dose reduction techniques were used. All stenosis measurements made according to NASCET criteria unless otherwise specified.    FINDINGS:   NONCONTRAST HEAD CT:   INTRACRANIAL CONTENTS: No intracranial hemorrhage, extraaxial collection, or mass effect.  No CT evidence of acute infarct. Normal parenchymal attenuation. Normal ventricles. 3.4 x 4.1 cm fluid density collection at the right middle cranial fossa likely   represents arachnoid cyst. There is mild mass effect upon the underlying right temporal lobe.    VISUALIZED ORBITS/SINUSES/MASTOIDS: No intraorbital abnormality. Moderate mucosal thickening scattered about the paranasal sinuses. No middle ear or mastoid effusion.    BONES/SOFT TISSUES: No acute abnormality.    HEAD CTA:  ANTERIOR CIRCULATION: No stenosis/occlusion, aneurysm, or high flow vascular malformation. Standard [place] anatomy.    POSTERIOR CIRCULATION: No stenosis/occlusion, aneurysm, or high flow vascular malformation. Balanced vertebral arteries supply a normal basilar artery.     DURAL VENOUS SINUSES: Expected enhancement of the major dural venous sinuses.    NECK CTA:  RIGHT CAROTID: Atherosclerotic plaque results in less than 50% stenosis in the right ICA. No dissection.    LEFT CAROTID: Atherosclerotic plaque results in less than 50% stenosis in the left ICA. No dissection.    VERTEBRAL  ARTERIES: No focal stenosis or dissection. Balanced vertebral arteries.    AORTIC ARCH: Classic aortic arch anatomy with no significant stenosis at the origin of the great vessels.    NONVASCULAR STRUCTURES: Unremarkable.      Impression    IMPRESSION:   HEAD CT:  1.  No acute intracranial process.  2.  Incidentally noted right middle cranial fossa arachnoid cyst.  3.  Moderate mucosal thickening of the ethmoid air cells and left frontal sinus.    HEAD CTA:   1.  Normal CTA [place].    NECK CTA:  1.  Normal neck CTA.   CTA Head Neck with Contrast     Status: None    Narrative    EXAM: CT HEAD W/O CONTRAST, CTA HEAD NECK W CONTRAST  LOCATION: [facility]  DATE/TIME: 10/4/2022 4:53 PM    INDICATION: visual changes  COMPARISON: None.  CONTRAST: 70ml isovue 370  TECHNIQUE: Head and neck CT angiogram with IV contrast. Noncontrast head CT followed by axial helical CT images of the head and neck vessels obtained during the arterial phase of intravenous contrast administration. Axial 2D reconstructed images and   multiplanar 3D MIP reconstructed images of the head and neck vessels were performed by the technologist. Dose reduction techniques were used. All stenosis measurements made according to NASCET criteria unless otherwise specified.    FINDINGS:   NONCONTRAST HEAD CT:   INTRACRANIAL CONTENTS: No intracranial hemorrhage, extraaxial collection, or mass effect.  No CT evidence of acute infarct. Normal parenchymal attenuation. Normal ventricles. 3.4 x 4.1 cm fluid density collection at the right middle cranial fossa likely   represents arachnoid cyst. There is mild mass effect upon the underlying right temporal lobe.    VISUALIZED ORBITS/SINUSES/MASTOIDS: No intraorbital abnormality. Moderate mucosal thickening scattered about the paranasal sinuses. No middle ear or mastoid effusion.    BONES/SOFT TISSUES: No acute abnormality.    HEAD CTA:  ANTERIOR CIRCULATION: No  stenosis/occlusion, aneurysm, or high flow vascular malformation. Standard [place] anatomy.    POSTERIOR CIRCULATION: No stenosis/occlusion, aneurysm, or high flow vascular malformation. Balanced vertebral arteries supply a normal basilar artery.     DURAL VENOUS SINUSES: Expected enhancement of the major dural venous sinuses.    NECK CTA:  RIGHT CAROTID: Atherosclerotic plaque results in less than 50% stenosis in the right ICA. No dissection.    LEFT CAROTID: Atherosclerotic plaque results in less than 50% stenosis in the left ICA. No dissection.    VERTEBRAL ARTERIES: No focal stenosis or dissection. Balanced vertebral arteries.    AORTIC ARCH: Classic aortic arch anatomy with no significant stenosis at the origin of the great vessels.    NONVASCULAR STRUCTURES: Unremarkable.      Impression    IMPRESSION:   HEAD CT:  1.  No acute intracranial process.  2.  Incidentally noted right middle cranial fossa arachnoid cyst.  3.  Moderate mucosal thickening of the ethmoid air cells and left frontal sinus.    HEAD CTA:   1.  Normal CTA [place].    NECK CTA:  1.  Normal neck CTA.   MR Brain w/o Contrast     Status: None    Narrative    EXAM: MR BRAIN W/O CONTRAST  LOCATION: [facility]  DATE/TIME: 10/4/2022 8:11 PM    INDICATION: Right-sided visual changes.  COMPARISON: CT head without contrast 10/04/2022.  TECHNIQUE: Routine multiplanar multisequence head MRI without intravenous contrast.    FINDINGS:  INTRACRANIAL CONTENTS: No acute or subacute infarct. There is an area of central T1 hyperintensity and peripheral T2 hypointensity with associated blooming artifact on gradient echo imaging along the superior aspect of the left cerebellar hemisphere.   There is a CSF signal region along the anterior right middle cranial fossa, most consistent with an arachnoid cyst. Scattered nonspecific T2/FLAIR hyperintensities within the cerebral white matter most consistent with mild  chronic microvascular ischemic   change. Mild generalized cerebral atrophy. No hydrocephalus. Mild cerebellar atrophy.     SELLA: No abnormality accounting for technique.    OSSEOUS STRUCTURES/SOFT TISSUES: Normal marrow signal. The major intracranial vascular flow voids are maintained.     ORBITS: No abnormality accounting for technique.     SINUSES/MASTOIDS: Mild mucosal thickening scattered about the paranasal sinuses. No middle ear or mastoid effusion.       Impression    IMPRESSION:  1.  No acute infarct or intracranial hemorrhage.  2.  T2 heterogeneous focus with associated susceptibility blooming artifact on gradient echo imaging, most consistent with a cavernous malformation.  3.  Arachnoid cyst along the right anterior cranial fossa.  4.  Generalized brain atrophy and presumed microvascular ischemic changes as detailed above.   Glucose by meter     Status: Abnormal   Result Value Ref Range    GLUCOSE BY METER POCT 118 (H) 70 - 99 mg/dL   Basic metabolic panel     Status: Abnormal   Result Value Ref Range    Sodium 137 133 - 144 mmol/L    Potassium 3.9 3.4 - 5.3 mmol/L    Chloride 106 94 - 109 mmol/L    Carbon Dioxide (CO2) 29 20 - 32 mmol/L    Anion Gap 2 (L) 3 - 14 mmol/L    Urea Nitrogen 17 7 - 30 mg/dL    Creatinine 0.77 0.66 - 1.25 mg/dL    Calcium 8.1 (L) 8.5 - 10.1 mg/dL    Glucose 90 70 - 99 mg/dL    GFR Estimate >90 >60 mL/min/1.73m2   INR     Status: Normal   Result Value Ref Range    INR 0.99 0.85 - 1.15   Partial thromboplastin time     Status: Normal   Result Value Ref Range    aPTT 26 22 - 38 Seconds   Troponin I     Status: Normal   Result Value Ref Range    Troponin I High Sensitivity 6 <79 ng/L   CBC with platelets and differential     Status: None   Result Value Ref Range    WBC Count 4.4 4.0 - 11.0 10e3/uL    RBC Count 4.86 4.40 - 5.90 10e6/uL    Hemoglobin 15.0 13.3 - 17.7 g/dL    Hematocrit 43.9 40.0 - 53.0 %    MCV 90 78 - 100 fL    MCH 30.9 26.5 - 33.0 pg    MCHC 34.2 31.5 - 36.5 g/dL     RDW 12.8 10.0 - 15.0 %    Platelet Count 211 150 - 450 10e3/uL    % Neutrophils 40 %    % Lymphocytes 34 %    % Monocytes 19 %    % Eosinophils 6 %    % Basophils 1 %    % Immature Granulocytes 0 %    NRBCs per 100 WBC 0 <1 /100    Absolute Neutrophils 1.8 1.6 - 8.3 10e3/uL    Absolute Lymphocytes 1.5 0.8 - 5.3 10e3/uL    Absolute Monocytes 0.8 0.0 - 1.3 10e3/uL    Absolute Eosinophils 0.2 0.0 - 0.7 10e3/uL    Absolute Basophils 0.0 0.0 - 0.2 10e3/uL    Absolute Immature Granulocytes 0.0 <=0.4 10e3/uL    Absolute NRBCs 0.0 10e3/uL   Erythrocyte sedimentation rate auto     Status: Normal   Result Value Ref Range    Erythrocyte Sedimentation Rate 10 0 - 20 mm/hr   CRP inflammation     Status: Abnormal   Result Value Ref Range    CRP Inflammation 13.2 (H) 0.0 - 8.0 mg/L   Symptomatic; Unknown Influenza A/B & SARS-CoV2 (COVID-19) Virus PCR Multiplex Nose     Status: Abnormal    Specimen: Nose; Swab   Result Value Ref Range    Influenza A PCR Negative Negative    Influenza B PCR Negative Negative    SARS CoV2 PCR Positive (A) Negative    Narrative    Testing was performed using the buck SARS-CoV-2 & Influenza A/B Assay on the buck Cammy System. This test should be ordered for the detection of SARS-CoV-2 and influenza viruses in individuals who meet clinical and/or epidemiological criteria. Test performance is unknown in asymptomatic patients. This test is for in vitro diagnostic use under the FDA EUA for laboratories certified under CLIA to perform moderate and/or high complexity testing. This test has not been FDA cleared or approved. A negative result does not rule out the presence of PCR inhibitors in the specimen or target RNA in concentration below the limit of detection for the assay. If only one viral target is positive but coinfection with multiple targets is suspected, the sample should be re-tested with another FDA cleared, approved or authorized test, if coinfection would change clinical managementYadi PARR  [facility] are certified under the Clinical Laboratory Improvement Amendments of 1988 (CLIA-88) as  qualified to perform moderate and/or high complexity laboratory testing.   CBC with Platelets & Differential     Status: None    Narrative    The following orders were created for panel order CBC with Platelets & Differential.  Procedure                               Abnormality         Status                     ---------                               -----------         ------                     CBC with platelets and d...[[id number]]                      Final result                 Please view results for these tests on the individual orders.     Medications   aspirin (ASA) chewable tablet 81 mg (has no administration in time range)        Assessments & Plan (with Medical Decision Making)   67-year-old male with transient right vision loss with 2 such episodes.  Now back to normal evaluated by ophthalmology and by stroke neurology.  Plan is for him to receive 81 mg of aspirin and neurology will admit him for a complete stroke work-up.  He is currently asymptomatic.    I have reviewed the nursing notes. I have reviewed the findings, diagnosis, plan and need for follow up with the patient.    New Prescriptions    No medications on file       Final diagnoses:   Transient visual loss, right       --  Vishal Montana MD  [facility] EMERGENCY DEPARTMENT  10/5/2022     Vishal Montana MD  10/05/22 183

## 2022-10-05 NOTE — ED NOTES
"   ED SIGNOUT Note    CC:      Chief Complaint   Patient presents with     Vision Changes Od        Sign-out received from Dr. Salvador Carcamo  HPI: Aníbal Goldstein is a 67 year old male seen in the emergency department and signed out to me at shift change. Please refer to the ED provider note.  Patient presented to the ED with 2 episodes of visual disturbance with a thick \"fog\", over the right eye that started around 11:30 AM today.  It was associated with right periorbital pain described as pressure.  He reports that the symptoms lasted for about 2 hours and then started to dissipate.  He still had slight blurry vision.  He states that he was able to see a little bit of light through the lower portion of his visual field.  He did not have any other associated symptoms such as speech difficulty, numbness, tingling, weakness, etc.  Symptoms recurred again while in the ED, and again vision change associated with right periorbital discomfort occurring almost at the same time.  We did note that his blood pressure is elevated at this time.  Patient started develop COVID symptoms Saturday, and tested positive for COVID on Sunday.  He started a full dose aspirin on that day.  Patient had been prescribed Paxilovid, but did not pick it up.  Signout received from Dr. Carcamo at the change of shift.  We were on a conference call with the stroke neurologist at the [facility].  They recommended that we obtain a ocular pressure and consider doing an ocular ultrasound.  Meanwhile, patient received a dose of Compazine for possible ocular migraine.  At the time of my visit with the patient, his vision was quickly improving and he only had mild periorbital pain.      Medical records reviewed     Physical Exam: Please see ED Provider Note  Initial vitals were reviewed  Last vitals: Blood pressure 115/69, pulse 73, temperature 98.5  F (36.9  C), resp. rate 16, weight 96.2 kg (212 lb), SpO2 96 %.      Labs/Imaging:  Results for " orders placed or performed during the hospital encounter of 10/04/22 (from the past 24 hour(s))   Glucose by meter   Result Value Ref Range    GLUCOSE BY METER POCT 118 (H) 70 - 99 mg/dL   CBC with Platelets & Differential    Narrative    The following orders were created for panel order CBC with Platelets & Differential.  Procedure                               Abnormality         Status                     ---------                               -----------         ------                     CBC with platelets and d...[[id number]]                      Final result                 Please view results for these tests on the individual orders.   Basic metabolic panel   Result Value Ref Range    Sodium 137 133 - 144 mmol/L    Potassium 3.9 3.4 - 5.3 mmol/L    Chloride 106 94 - 109 mmol/L    Carbon Dioxide (CO2) 29 20 - 32 mmol/L    Anion Gap 2 (L) 3 - 14 mmol/L    Urea Nitrogen 17 7 - 30 mg/dL    Creatinine 0.77 0.66 - 1.25 mg/dL    Calcium 8.1 (L) 8.5 - 10.1 mg/dL    Glucose 90 70 - 99 mg/dL    GFR Estimate >90 >60 mL/min/1.73m2   INR   Result Value Ref Range    INR 0.99 0.85 - 1.15   Partial thromboplastin time   Result Value Ref Range    aPTT 26 22 - 38 Seconds   Troponin I   Result Value Ref Range    Troponin I High Sensitivity 6 <79 ng/L   CBC with platelets and differential   Result Value Ref Range    WBC Count 4.4 4.0 - 11.0 10e3/uL    RBC Count 4.86 4.40 - 5.90 10e6/uL    Hemoglobin 15.0 13.3 - 17.7 g/dL    Hematocrit 43.9 40.0 - 53.0 %    MCV 90 78 - 100 fL    MCH 30.9 26.5 - 33.0 pg    MCHC 34.2 31.5 - 36.5 g/dL    RDW 12.8 10.0 - 15.0 %    Platelet Count 211 150 - 450 10e3/uL    % Neutrophils 40 %    % Lymphocytes 34 %    % Monocytes 19 %    % Eosinophils 6 %    % Basophils 1 %    % Immature Granulocytes 0 %    NRBCs per 100 WBC 0 <1 /100    Absolute Neutrophils 1.8 1.6 - 8.3 10e3/uL    Absolute Lymphocytes 1.5 0.8 - 5.3 10e3/uL    Absolute Monocytes 0.8 0.0 - 1.3 10e3/uL    Absolute Eosinophils 0.2 0.0 -  0.7 10e3/uL    Absolute Basophils 0.0 0.0 - 0.2 10e3/uL    Absolute Immature Granulocytes 0.0 <=0.4 10e3/uL    Absolute NRBCs 0.0 10e3/uL   Erythrocyte sedimentation rate auto   Result Value Ref Range    Erythrocyte Sedimentation Rate 10 0 - 20 mm/hr   CRP inflammation   Result Value Ref Range    CRP Inflammation 13.2 (H) 0.0 - 8.0 mg/L   CT Head w/o Contrast    Narrative    EXAM: CT HEAD W/O CONTRAST, CTA HEAD NECK W CONTRAST  LOCATION: [facility]  DATE/TIME: 10/4/2022 4:53 PM    INDICATION: visual changes  COMPARISON: None.  CONTRAST: 70ml isovue 370  TECHNIQUE: Head and neck CT angiogram with IV contrast. Noncontrast head CT followed by axial helical CT images of the head and neck vessels obtained during the arterial phase of intravenous contrast administration. Axial 2D reconstructed images and   multiplanar 3D MIP reconstructed images of the head and neck vessels were performed by the technologist. Dose reduction techniques were used. All stenosis measurements made according to NASCET criteria unless otherwise specified.    FINDINGS:   NONCONTRAST HEAD CT:   INTRACRANIAL CONTENTS: No intracranial hemorrhage, extraaxial collection, or mass effect.  No CT evidence of acute infarct. Normal parenchymal attenuation. Normal ventricles. 3.4 x 4.1 cm fluid density collection at the right middle cranial fossa likely   represents arachnoid cyst. There is mild mass effect upon the underlying right temporal lobe.    VISUALIZED ORBITS/SINUSES/MASTOIDS: No intraorbital abnormality. Moderate mucosal thickening scattered about the paranasal sinuses. No middle ear or mastoid effusion.    BONES/SOFT TISSUES: No acute abnormality.    HEAD CTA:  ANTERIOR CIRCULATION: No stenosis/occlusion, aneurysm, or high flow vascular malformation. Standard [place] anatomy.    POSTERIOR CIRCULATION: No stenosis/occlusion, aneurysm, or high flow vascular malformation. Balanced vertebral arteries supply a  normal basilar artery.     DURAL VENOUS SINUSES: Expected enhancement of the major dural venous sinuses.    NECK CTA:  RIGHT CAROTID: Atherosclerotic plaque results in less than 50% stenosis in the right ICA. No dissection.    LEFT CAROTID: Atherosclerotic plaque results in less than 50% stenosis in the left ICA. No dissection.    VERTEBRAL ARTERIES: No focal stenosis or dissection. Balanced vertebral arteries.    AORTIC ARCH: Classic aortic arch anatomy with no significant stenosis at the origin of the great vessels.    NONVASCULAR STRUCTURES: Unremarkable.      Impression    IMPRESSION:   HEAD CT:  1.  No acute intracranial process.  2.  Incidentally noted right middle cranial fossa arachnoid cyst.  3.  Moderate mucosal thickening of the ethmoid air cells and left frontal sinus.    HEAD CTA:   1.  Normal CTA [place].    NECK CTA:  1.  Normal neck CTA.   CTA Head Neck with Contrast    Narrative    EXAM: CT HEAD W/O CONTRAST, CTA HEAD NECK W CONTRAST  LOCATION: [facility]  DATE/TIME: 10/4/2022 4:53 PM    INDICATION: visual changes  COMPARISON: None.  CONTRAST: 70ml isovue 370  TECHNIQUE: Head and neck CT angiogram with IV contrast. Noncontrast head CT followed by axial helical CT images of the head and neck vessels obtained during the arterial phase of intravenous contrast administration. Axial 2D reconstructed images and   multiplanar 3D MIP reconstructed images of the head and neck vessels were performed by the technologist. Dose reduction techniques were used. All stenosis measurements made according to NASCET criteria unless otherwise specified.    FINDINGS:   NONCONTRAST HEAD CT:   INTRACRANIAL CONTENTS: No intracranial hemorrhage, extraaxial collection, or mass effect.  No CT evidence of acute infarct. Normal parenchymal attenuation. Normal ventricles. 3.4 x 4.1 cm fluid density collection at the right middle cranial fossa likely   represents arachnoid cyst. There is  mild mass effect upon the underlying right temporal lobe.    VISUALIZED ORBITS/SINUSES/MASTOIDS: No intraorbital abnormality. Moderate mucosal thickening scattered about the paranasal sinuses. No middle ear or mastoid effusion.    BONES/SOFT TISSUES: No acute abnormality.    HEAD CTA:  ANTERIOR CIRCULATION: No stenosis/occlusion, aneurysm, or high flow vascular malformation. Standard [place] anatomy.    POSTERIOR CIRCULATION: No stenosis/occlusion, aneurysm, or high flow vascular malformation. Balanced vertebral arteries supply a normal basilar artery.     DURAL VENOUS SINUSES: Expected enhancement of the major dural venous sinuses.    NECK CTA:  RIGHT CAROTID: Atherosclerotic plaque results in less than 50% stenosis in the right ICA. No dissection.    LEFT CAROTID: Atherosclerotic plaque results in less than 50% stenosis in the left ICA. No dissection.    VERTEBRAL ARTERIES: No focal stenosis or dissection. Balanced vertebral arteries.    AORTIC ARCH: Classic aortic arch anatomy with no significant stenosis at the origin of the great vessels.    NONVASCULAR STRUCTURES: Unremarkable.      Impression    IMPRESSION:   HEAD CT:  1.  No acute intracranial process.  2.  Incidentally noted right middle cranial fossa arachnoid cyst.  3.  Moderate mucosal thickening of the ethmoid air cells and left frontal sinus.    HEAD CTA:   1.  Normal CTA [place].    NECK CTA:  1.  Normal neck CTA.   MR Brain w/o Contrast    Narrative    EXAM: MR BRAIN W/O CONTRAST  LOCATION: [facility]  DATE/TIME: 10/4/2022 8:11 PM    INDICATION: Right-sided visual changes.  COMPARISON: CT head without contrast 10/04/2022.  TECHNIQUE: Routine multiplanar multisequence head MRI without intravenous contrast.    FINDINGS:  INTRACRANIAL CONTENTS: No acute or subacute infarct. There is an area of central T1 hyperintensity and peripheral T2 hypointensity with associated blooming artifact on gradient echo  imaging along the superior aspect of the left cerebellar hemisphere.   There is a CSF signal region along the anterior right middle cranial fossa, most consistent with an arachnoid cyst. Scattered nonspecific T2/FLAIR hyperintensities within the cerebral white matter most consistent with mild chronic microvascular ischemic   change. Mild generalized cerebral atrophy. No hydrocephalus. Mild cerebellar atrophy.     SELLA: No abnormality accounting for technique.    OSSEOUS STRUCTURES/SOFT TISSUES: Normal marrow signal. The major intracranial vascular flow voids are maintained.     ORBITS: No abnormality accounting for technique.     SINUSES/MASTOIDS: Mild mucosal thickening scattered about the paranasal sinuses. No middle ear or mastoid effusion.       Impression    IMPRESSION:  1.  No acute infarct or intracranial hemorrhage.  2.  T2 heterogeneous focus with associated susceptibility blooming artifact on gradient echo imaging, most consistent with a cavernous malformation.  3.  Arachnoid cyst along the right anterior cranial fossa.  4.  Generalized brain atrophy and presumed microvascular ischemic changes as detailed above.   Symptomatic; Unknown Influenza A/B & SARS-CoV2 (COVID-19) Virus PCR Multiplex Nose    Specimen: Nose; Swab   Result Value Ref Range    Influenza A PCR Negative Negative    Influenza B PCR Negative Negative    SARS CoV2 PCR Positive (A) Negative    Narrative    Testing was performed using the buck SARS-CoV-2 & Influenza A/B Assay on the buck Cam[employer] System. This test should be ordered for the detection of SARS-CoV-2 and influenza viruses in individuals who meet clinical and/or epidemiological criteria. Test performance is unknown in asymptomatic patients. This test is for in vitro diagnostic use under the FDA EUA for laboratories certified under CLIA to perform moderate and/or high complexity testing. This test has not been FDA cleared or approved. A negative result does not rule out the presence  of PCR inhibitors in the specimen or target RNA in concentration below the limit of detection for the assay. If only one viral target is positive but coinfection with multiple targets is suspected, the sample should be re-tested with another FDA cleared, approved or authorized test, if coinfection would change clinical management. [facility] are certified under the Clinical Laboratory Improvement Amendments of 1988 (CLIA-88) as  qualified to perform moderate and/or high complexity laboratory testing.     IMPRESSION:   Final diagnoses:   Vision changes   Infection due to 2019 novel coronavirus   Monocular visual disturbance       ED COURSE/MEDICAL DECISION MAKING  Aníbal Goldstein is a 67 year old male signed out to me at the change of shift.  Patient has visual disturbance in the setting of recent COVID infection diagnosis.  Patient underwent stroke evaluation for possible TIA with CT, CTA, and MRI.  All of these tests have been negative.  Patient had a second episode of visual disturbance, possibly amaurosis fugax but associated with positive phenomenon of periorbital pain.  The patient's CRP and sed rate are fairly normal, and unlikely to be caused from giant cell arteritis.    I performed an informal bedside ultrasound, and did not see any retinal abnormalities.  The optic nerve appeared to be normal size.  Ocular pressures were performed with a reading of 20 mmHg in the left eye, and 14 mm on the right eye.    Patient will be boarded in the emergency department overnight.  We will try to obtain a inpatient bed at the [facility] for neurologic and ophthalmology consultation.  Consider the possibility that he may have vasculitis or retinal migraine related to his COVID diagnosis.    4:53 AM: Patient has had an uneventful night.  Neurochecks have been stable without any further recurrence of the visual disturbance or periorbital pain.  I spoke with Dr. Kauffman, hospitalist at the  [facility].  Patient is on the wait list for an available bed but has been accepted.  We do not need to do an ED to ED transfer at this time.    Patient will be signed out to Dr. Powers at the change of shift.                            Mario Pierre MD  10/05/22 0457

## 2022-10-05 NOTE — ED NOTES
"ED Triage Provider Note  [facility]  Encounter Date: Oct 5, 2022    History:  Chief Complaint   Patient presents with     Eye Pain     Loss of Vision     Aníbal Goldstein is a 67 year old male who presents to the ED with episodes of visual loss and ophthalmology consultation..Aníbal Goldstein is a 67 year old male   Had an episode of right eye visual loss yesterday at about 12 noon.  He states the vision was foggy, cloudy with a gray haze.  The last lasted until 2:30 PM.  He then went to [place].  He underwent multiple imaging studies.  He again had some visual loss in the evening where his vision was foggy.  This also resolved.  The patient complains of a tightness behind his right in the right temple area.  He was apparently sent to [facility] ED for an ophthalmology and neuro stroke consultation.    Review of Systems:  Eyes: Episodes of visual fogginess and loss    Exam:\BP (!) 161/99   Pulse 82   Temp 97.9  F (36.6  C) (Temporal)   Resp 16   Ht 1.702 m (5' 7\")   Wt 98 kg (216 lb)   SpO2 96%   BMI 33.83 kg/m     There were no vitals taken for this visit.  General: No acute distress. Appears stated age.   HEENT: Normocephalic atraumatic, pupils equal round and to light, EOMI,  Cardio: Regular rate, extremities well perfused  Resp: Normal work of breathing, grossly normal respiratory rate  Neuro: Alert. CN II-XII grossly intact. Grossly intact strength.       Medical Decision Making:  Patient arriving to the ED with problem as above. A medical screening exam was performed.  Ophthalmology and neuro stroke consulted from triage initiated from Triage. The patient is appropriate to wait in triage.    Hailey Liu MD on 10/5/2022 at 2:58 PM      Hailey Liu MD  10/05/22 1500    "

## 2022-10-05 NOTE — CONSULTS
"      [facility]    Stroke Telephone Note    I was called by Salvador Carcamo on 10/04/22 regarding patient Aníbal Goldstein. The patient is a 67 year old male who presents with transient right eye vision loss described as a curtain coming down over his eye. It lasted 2.5 hours and resolved. He had another episode in the ED. MRI was negative for acute stroke.     Impression:  # transient vision loss- TIA vs intraocular pathology. Other possible etiologies include GCA vs retinal detachment. He has had 2 episodes now which is unusual for a TIA picture without vessel stenosis. Would ideally rule out other intraocular pathology.     Plan:  In addition to prior note plan would recommend the following:  Intraocular pressure/ Retinal ultrasound  Transfer to [facility] for optho consult  ESR/ CRP        My recommendations are based on the information provided over the phone by Aníbal Goldstein's in-person providers. They are not intended to replace the clinical judgment of his in-person providers. I was not requested to personally see or examine the patient at this time.    The Stroke Staff is Dr. Loera  .    Asha Elmore MD  Vascular Neurology Fellow  To page me or covering stroke neurology team member, click here: AMCOM   Choose \"On Call\" tab at top, then search dropdown box for \"Neurology Adult\", select location, press Enter, then look for stroke/neuro ICU/telestroke.        "

## 2022-10-05 NOTE — CONSULTS
OPHTHALMOLOGY CONSULT NOTE  10/05/22    Patient: Aníbal Goldstein      ASSESSMENT/PLAN:     Aníbal Goldstein is a 67 year old male who presented with two episodes of acute transient right eye vision loss. The first episode occurred yesterday at 12pm and lasted 2.5 hours and the second episode occurred at 7pm and lasted 1.5 hours. His vision has since returned to baseline. He was also having mild temple tenderness at the time of his vision loss but GCA symptoms were otherwise negative. Of note the patient has asymptomatic covid infection. He does not have history of HTN or HLD and is not on any prescription medication.    Broad differential at this time; however, given excellent vision with 20/20 both eyes, no RAPD OU, CVF full to CF OU; central retinal artery occlusion unlikely, even in setting of cilioretinal artery sparing CRAO (which would have constricted visual fields); ischemic optic neuropathy also would have likely had an RAPD and would not have been transient; transient monocular vision loss (amaurosis fugax) typically does not have episodes lasting so long and also no hollenhorst plaque noted on examination; giant cell arteritis must be considered in a patient of this gentleman's age due to mild temporal tenderness during visual loss episodes, but patient does not have other signs/symptoms otherwise concerning for GCA and ESR and platelets normal and CRP normal when adjusting for age, further, CRP may be confounded by covid infection; No explanatory findings on eye exam today; can consider impending CRAO vs ocular migraine, however, overall patient's eye exam notably unremarkable today    will re-evaluate patient and obtain further testing in clinic tomorrow pending ability to do so due to covid infection    Recommendations:  - will have patient follow up in clinic tomorrow morning at 7:30 am  - complete stroke workup per Neurology  - can consider at minimum temporal artery ultrasound if there is  concern for GCA, however, will defer to primary  - can consider calcium channel blocker for BP control which may address possible vasospasm component resulting in transient vision loss    It is our pleasure to participate in this patient's care and treatment. Please contact us with any further questions or concerns.    Discussed with Dr. Lay and Dr. Terrazas who agreed with this assessment and plan.     Cecy Hernandez MD     HISTORY OF PRESENTING ILLNESS:     Aníbal Goldstein is a 67 year old male who presented on acute transient right eye vision loss. Patient reports yesterday at 12pm he was at home when he noticed the vision in his right eye decreased and initially was blurry and then progressed over the next several minutes to being completely grey as if a curtain were coming down over his vision. At this time he was unable to see hand motion in front of his right eye. He went to the ED at outside hospital and stroke code was called. He was evaluated by Telestroke and underwent CTA and MRI which did not reveal acute infarct. His vision continued to be blurry for ~2.5 hours but then returned to baseline. He had an additional episode while in the ED at 7pm last night and this episode lasted 1.5 hours. At that time he could count fingers but the fingers were very blurry. This episode resolved and his vision then returned to baseline. He had some temple tenderness on the right side during the episodes but denies headaches, scalp tenderness, jaw claudication, weight change, myalgias/arthralgias.      10+ review of systems were otherwise negative except for that which has been stated above.      OCULAR/MEDICAL/SURGICAL HISTORIES:     Past Ocular History:   Never seen by an eye doctor. Does not wear glasses. No eye surgeries or injuries.    Eye Drops:   None    Pertinent Systemic Medications:   Does not take prescription medication. Takes OTC aspirin.    Past Medical History:  Past Medical History:   Diagnosis  Date     Cervicalgia 1/2008     Pain in joint, shoulder region 1/2008       Past Surgical History:   Past Surgical History:   Procedure Laterality Date     COLONOSCOPY       COLONOSCOPY WITH CO2 INSUFFLATION N/A 5/17/2021    Procedure: COLONOSCOPY, WITH CO2 INSUFFLATION;  Surgeon: Elder Bates DO;  Location: [facility] OR     HC KNEE SCOPE,MED/LAT MENISECTOMY  2/9/2009    Right knee     HERNIA REPAIR, INGUINAL RT/LT      lt ?     RELEASE CARPAL TUNNEL Right 3/1/2018    Procedure: RELEASE CARPAL TUNNEL;  Right carpal tunnel release;  Surgeon: Britney Delgado MD;  Location: [facility] OR     ROTATOR CUFF REPAIR RT/LT  02/18/10    Right side. Done at [facility].       Family History:  No family history of glaucoma or AMD    Social History:  Lives with wife  Denies tobacco use or illicit substance use. Occasionally drinks alcohol every other week    EXAMINATION:     Base Eye Exam     Visual Acuity       Right Left    Near cc 20/20 20/20          Tonometry (Tonopen, 5:03 PM)       Right Left    Pressure 21 20          Pupils       Pupils    Right PERRL    Left PERRL          Visual Fields       Left Right     Full Full          Extraocular Movement       Right Left     Full Full          Neuro/Psych     Oriented x3: Yes          Dilation     Both eyes: 1.0% Mydriacyl, 2.5% Bin Synephrine @ 5:03 PM            Slit Lamp and Fundus Exam     External Exam       Right Left    External Normal Normal          Slit Lamp Exam       Right Left    Lids/Lashes Normal Normal    Conjunctiva/Sclera White and quiet, concretions White and quiet, concretions    Cornea Clear Clear    Anterior Chamber Deep and quiet Deep and quiet    Iris Round and reactive, inferior irtis atrophy 3-9 o'clock Round and reactive, inferior iris atrophy at 6 o'clock    Lens 1+ NS 1+ NS    Vitreous Normal Normal          Fundus Exam       Right Left    Disc Normal Normal    C/D Ratio 0.3 0.3    Macula Normal, no whitening Normal    Vessels attenuated, no  Holenhorst plaque attenuated    Periphery Normal Normal                Labs/Studies/Imaging Performed  CT Head 10/4/22  IMPRESSION:   HEAD CT:  1.  No acute intracranial process.  2.  Incidentally noted right middle cranial fossa arachnoid cyst.  3.  Moderate mucosal thickening of the ethmoid air cells and left frontal sinus.     HEAD CTA:   1.  Normal CTA [place] [facility] [place].     NECK CTA:  1.  Normal neck CTA.    MRI Brain 10/4/22  IMPRESSION:  1.  No acute infarct or intracranial hemorrhage.  2.  T2 heterogeneous focus with associated susceptibility blooming artifact on gradient echo imaging, most consistent with a cavernous malformation.  3.  Arachnoid cyst along the right anterior cranial fossa.  4.  Generalized brain atrophy and presumed microvascular ischemic changes as detailed above.       Yokasta Hernandez MD  Resident Physician, PGY-2  Department of Ophthalmology  10/05/22 5:00 PM   Pager: [phone]

## 2022-10-05 NOTE — ED NOTES
"Patient called out at this time stating that his R eye was getting \"foggy again, like it was at 2:00 when this initially started.\" Dr. Carcamo notified.   "

## 2022-10-06 ENCOUNTER — APPOINTMENT (OUTPATIENT)
Dept: CARDIOLOGY | Facility: CLINIC | Age: 67
End: 2022-10-06
Attending: OPHTHALMOLOGY
Payer: COMMERCIAL

## 2022-10-06 ENCOUNTER — OFFICE VISIT (OUTPATIENT)
Dept: OPHTHALMOLOGY | Facility: CLINIC | Age: 67
End: 2022-10-06
Attending: OPHTHALMOLOGY
Payer: COMMERCIAL

## 2022-10-06 VITALS
OXYGEN SATURATION: 96 % | RESPIRATION RATE: 18 BRPM | HEIGHT: 67 IN | BODY MASS INDEX: 33.9 KG/M2 | SYSTOLIC BLOOD PRESSURE: 148 MMHG | DIASTOLIC BLOOD PRESSURE: 99 MMHG | WEIGHT: 216 LBS | TEMPERATURE: 97.8 F | HEART RATE: 81 BPM

## 2022-10-06 DIAGNOSIS — H53.121 TRANSIENT VISUAL LOSS OF RIGHT EYE: Primary | ICD-10-CM

## 2022-10-06 PROBLEM — E78.2 MIXED HYPERLIPIDEMIA: Chronic | Status: ACTIVE | Noted: 2022-10-06

## 2022-10-06 PROBLEM — I10 BENIGN ESSENTIAL HYPERTENSION: Chronic | Status: ACTIVE | Noted: 2022-10-06

## 2022-10-06 LAB
ANION GAP SERPL CALCULATED.3IONS-SCNC: 9 MMOL/L (ref 7–15)
BUN SERPL-MCNC: 15.6 MG/DL (ref 8–23)
CALCIUM SERPL-MCNC: 8.6 MG/DL (ref 8.8–10.2)
CHLORIDE SERPL-SCNC: 105 MMOL/L (ref 98–107)
CREAT SERPL-MCNC: 0.79 MG/DL (ref 0.67–1.17)
DEPRECATED HCO3 PLAS-SCNC: 24 MMOL/L (ref 22–29)
ERYTHROCYTE [DISTWIDTH] IN BLOOD BY AUTOMATED COUNT: 12.9 % (ref 10–15)
GFR SERPL CREATININE-BSD FRML MDRD: >90 ML/MIN/1.73M2
GLUCOSE BLDC GLUCOMTR-MCNC: 158 MG/DL (ref 70–99)
GLUCOSE SERPL-MCNC: 99 MG/DL (ref 70–99)
HCT VFR BLD AUTO: 42.9 % (ref 40–53)
HGB BLD-MCNC: 14.6 G/DL (ref 13.3–17.7)
LVEF ECHO: NORMAL
MCH RBC QN AUTO: 30.6 PG (ref 26.5–33)
MCHC RBC AUTO-ENTMCNC: 34 G/DL (ref 31.5–36.5)
MCV RBC AUTO: 90 FL (ref 78–100)
PLATELET # BLD AUTO: 218 10E3/UL (ref 150–450)
POTASSIUM SERPL-SCNC: 3.9 MMOL/L (ref 3.4–5.3)
RBC # BLD AUTO: 4.77 10E6/UL (ref 4.4–5.9)
SODIUM SERPL-SCNC: 138 MMOL/L (ref 136–145)
TROPONIN T SERPL HS-MCNC: 9 NG/L
WBC # BLD AUTO: 4.4 10E3/UL (ref 4–11)

## 2022-10-06 PROCEDURE — 80048 BASIC METABOLIC PNL TOTAL CA: CPT

## 2022-10-06 PROCEDURE — G0378 HOSPITAL OBSERVATION PER HR: HCPCS

## 2022-10-06 PROCEDURE — 85027 COMPLETE CBC AUTOMATED: CPT

## 2022-10-06 PROCEDURE — 999N000208 ECHOCARDIOGRAM COMPLETE

## 2022-10-06 PROCEDURE — 36415 COLL VENOUS BLD VENIPUNCTURE: CPT

## 2022-10-06 PROCEDURE — 93270 REMOTE 30 DAY ECG REV/REPORT: CPT

## 2022-10-06 PROCEDURE — 250N000013 HC RX MED GY IP 250 OP 250 PS 637

## 2022-10-06 PROCEDURE — 93306 TTE W/DOPPLER COMPLETE: CPT | Mod: 26 | Performed by: INTERNAL MEDICINE

## 2022-10-06 PROCEDURE — 93228 REMOTE 30 DAY ECG REV/REPORT: CPT | Performed by: INTERNAL MEDICINE

## 2022-10-06 PROCEDURE — G0463 HOSPITAL OUTPT CLINIC VISIT: HCPCS

## 2022-10-06 PROCEDURE — 92004 COMPRE OPH EXAM NEW PT 1/>: CPT | Mod: GC | Performed by: OPHTHALMOLOGY

## 2022-10-06 PROCEDURE — 99217 PR OBSERVATION CARE DISCHARGE: CPT | Mod: GC | Performed by: STUDENT IN AN ORGANIZED HEALTH CARE EDUCATION/TRAINING PROGRAM

## 2022-10-06 PROCEDURE — 93306 TTE W/DOPPLER COMPLETE: CPT

## 2022-10-06 RX ORDER — ATORVASTATIN CALCIUM 40 MG/1
40 TABLET, FILM COATED ORAL EVERY EVENING
Qty: 90 TABLET | Refills: 3 | Status: SHIPPED | OUTPATIENT
Start: 2022-10-06 | End: 2023-10-18

## 2022-10-06 RX ORDER — ATORVASTATIN CALCIUM 40 MG/1
40 TABLET, FILM COATED ORAL EVERY EVENING
Status: DISCONTINUED | OUTPATIENT
Start: 2022-10-06 | End: 2022-10-06 | Stop reason: HOSPADM

## 2022-10-06 RX ORDER — ASPIRIN 325 MG
325 TABLET, DELAYED RELEASE (ENTERIC COATED) ORAL DAILY
Qty: 90 TABLET | Refills: 3 | Status: SHIPPED | OUTPATIENT
Start: 2022-10-07 | End: 2024-01-17

## 2022-10-06 RX ORDER — ASPIRIN 81 MG/1
324 TABLET, CHEWABLE ORAL DAILY
Status: DISCONTINUED | OUTPATIENT
Start: 2022-10-06 | End: 2022-10-06 | Stop reason: HOSPADM

## 2022-10-06 RX ORDER — LISINOPRIL 10 MG/1
10 TABLET ORAL DAILY
Status: DISCONTINUED | OUTPATIENT
Start: 2022-10-06 | End: 2022-10-06 | Stop reason: HOSPADM

## 2022-10-06 RX ORDER — LISINOPRIL 10 MG/1
10 TABLET ORAL DAILY
Qty: 90 TABLET | Refills: 3 | Status: SHIPPED | OUTPATIENT
Start: 2022-10-07 | End: 2022-10-14

## 2022-10-06 RX ADMIN — ASPIRIN 325 MG: 325 TABLET, COATED ORAL at 13:19

## 2022-10-06 RX ADMIN — LISINOPRIL 10 MG: 10 TABLET ORAL at 13:18

## 2022-10-06 ASSESSMENT — ACTIVITIES OF DAILY LIVING (ADL)
ADLS_ACUITY_SCORE: 18

## 2022-10-06 ASSESSMENT — SLIT LAMP EXAM - LIDS
COMMENTS: NORMAL
COMMENTS: NORMAL

## 2022-10-06 ASSESSMENT — EXTERNAL EXAM - RIGHT EYE: OD_EXAM: NORMAL

## 2022-10-06 ASSESSMENT — VISUAL ACUITY
OS_PH_SC: 20/20
OD_SC: 20/25
OU: BASELINE
OD_PH_SC: 20/20
OU: BASELINE
METHOD: SNELLEN - LINEAR
OS_PH_SC+: -2
OD_SC+: -1
OS_SC: 20/60

## 2022-10-06 ASSESSMENT — TONOMETRY
OD_IOP_MMHG: 23
IOP_METHOD: TONOPEN
OS_IOP_MMHG: 19

## 2022-10-06 ASSESSMENT — PATIENT HEALTH QUESTIONNAIRE - PHQ9: SUM OF ALL RESPONSES TO PHQ QUESTIONS 1-9: 0

## 2022-10-06 ASSESSMENT — EXTERNAL EXAM - LEFT EYE: OS_EXAM: NORMAL

## 2022-10-06 ASSESSMENT — CUP TO DISC RATIO
OS_RATIO: 0.3
OD_RATIO: 0.3

## 2022-10-06 ASSESSMENT — CONF VISUAL FIELD
OS_NORMAL: 1
OD_NORMAL: 1

## 2022-10-06 NOTE — PROGRESS NOTES
HPI:   Aníbal Goldstein is a 67 year old male who is currently admitted to the hospital who presents to clinic for one day follow up. He experienced two episodes of transient right eye vision loss that happened at 12pm and 7pm on 10/4. He presented to outside ED and underwent stroke workup and was transferred to [place] for ophthalmology evaluation. He has not had any further episodes of vision loss since the first episode. He reports lights appear less bright in his right eye    Past Ocular history:   - Glasses: none  - Contact lens wear: none  - Ocular Surgical History: none  - Current Eye drops:  none    PMH: HLD not on statin, current covid infx diagnosed 10/2    FH: No family hx of glaucoma or AMD    Review of systems for the eyes was negative other than the pertinent positives/negatives listed in the HPI.      Imaging:   CT Head 10/4/22  IMPRESSION:   HEAD CT:  1.  No acute intracranial process.  2.  Incidentally noted right middle cranial fossa arachnoid cyst.  3.  Moderate mucosal thickening of the ethmoid air cells and left frontal sinus.     HEAD CTA:   1.  Normal CTA [place].     NECK CTA:  1.  Normal neck CTA.     MRI Brain 10/4/22  IMPRESSION:  1.  No acute infarct or intracranial hemorrhage.  2.  T2 heterogeneous focus with associated susceptibility blooming artifact on gradient echo imaging, most consistent with a cavernous malformation.  3.  Arachnoid cyst along the right anterior cranial fossa.  4.  Generalized brain atrophy and presumed microvascular ischemic changes as detailed above.    Assessment & Plan      Aníbal Goldstein is a 67 year old male with the following diagnoses:   1. Transient visual loss of right eye         Patient is a 66 yo male with no ocular history and no significant past medical history who presents for one day follow up of transient right eye vision loss. He had two episodes of right eye vision loss on 10/4. He is currently admitted to the hospital for  stroke workup which has so far been negative. MRI brain without signs of acute infarct. CTA without carotid stenosis. LDL borderline elevated at 199. BP elevated to 170s in the ED but has trended downward to 120-130 systolic since admission. Etiology of transient right eye vision loss is unclear but differential includes CRAO vs BRAO vs GCA vs ischemic optic neuropathy vs amaurosis fugax. See consult note from 10/5 for further discussion but repeat exam today is consistent from yesterdays exam. Best corrected visual acuity is 20/20 both eyes. No RAPD. No pathology or retinal whitening on fundus exam. Unable to perform further testing in clinic today due to acute covid infection but exam is reassuring and does not support diagnosis as listed above.    Recommendations:  - continue stroke workup per Neurology recommendations  - can consider calcium channel blocker for BP control which may address possible vasospasm component resulting in transient vision loss  - follow up in Ophthalmology clinic in 2-4 weeks      Patient disposition:   Return to inpatient  Follow up with ophthalmology in 2-4 weeks for dilated exam    Patient seen with Dr. Aby Hernandez MD  Resident Physician, PGY-2  Department of Ophthalmology  10/06/22 8:53 AM    Attending Physician Attestation:  Complete documentation of historical and exam elements from today's encounter can be found in the full encounter summary report (not reduplicated in this progress note).  I personally obtained the chief complaint(s) and history of present illness.  I confirmed and edited as necessary the review of systems, past medical/surgical history, family history, social history, and examination findings as documented by others; and I examined the patient myself.  I personally reviewed the relevant tests, images, and reports as documented above.  I formulated and edited as necessary the assessment and plan and discussed the findings and management plan with  the patient and family. . - Fracisco Badillo MD

## 2022-10-06 NOTE — CARE PLAN
Your risk factors for stroke or TIA (transient ischemic attack):    Your Risk Factors Your Results Normal Ranges   High blood pressure BP Readings from Last 1 Encounters:   10/06/22 (!) 155/78    Less than 120/80   Cholesterol              Total Lab Results   Component Value Date    CHOL 199 10/05/2022    CHOL 206 04/07/2021      Less than 150    Triglycerides   Lab Results   Component Value Date    TRIG 139 10/05/2022    TRIG 154 04/07/2021    Less than 150   LDL Lab Results   Component Value Date     10/05/2022     04/07/2021       Less than 70   HDL Lab Results   Component Value Date    HDL 51 10/05/2022    HDL 65 04/07/2021            Greater than 40 (men)  Greater than 50 (women)   Diabetes Recent Labs   Lab 10/06/22  1224   *    Fasting blood glucose    Smoking/tobacco use  Quit smoking and tobacco   Overweight  Lose 1-2 pounds a week   Lack of exercise  30 minutes moderate activity each day   Other risk factors include carotid (neck) artery disease, atrial fibrillation and stress. You may be on new medicine to treat high blood pressure, cholesterol, diabetes or atrial fibrillation.    Understanding Stroke Booklet given to patient. Please refer to booklet for further information.    Stroke warning signs and symptoms - CALL 911 right away for:  - Sudden numbness or weakness in the face, arm or leg (often on one side of the body).  - Sudden confusion or trouble understanding what is going on.  - Sudden blurred or decreased vision in one or both eyes.  - Sudden trouble speaking, loss of balance, dizziness or problems with coordination.  - Sudden, severe headache for no reason.  - Fainting or seizures.  - Symptoms may go away then come back suddenly.    Reviewed with pt at bedside

## 2022-10-06 NOTE — UTILIZATION REVIEW
"Admission Status; Secondary Review Determination         Under the authority of the Utilization Management Committee, the utilization review process indicated a secondary review on the above patient.  The review outcome is based on review of the medical records, discussions with staff, and applying clinical experience noted on the date of the review.          (x) Observation Status Appropriate - This patient does not meet hospital inpatient criteria and is placed in observation status. If this patient's primary payer is Medicare and was admitted as an inpatient, Condition Code 44 should be used and patient status changed to \"observation\".     RATIONALE FOR DETERMINATION     Patient is a 67M with a h/o HLD per chart review (apparently was lost to follow-up and never started on a statin) and limited doctoring. He presented to the ED with painless, near-complete, unilateral vision loss occurring in context of active COVID infection. Symptoms did resolve.  Central versus branch retinal artery occlusion was suspected.  Patient was admitted in case there was reocclusion and for further evaluation to rule out stroke.  Evaluation has shown elevated blood pressure but overall stable vital signs. Laboratory evaluation has been unremarkable except for positive COVID-19 testing.  CT of head, CTA of head and neck, and MRI brain showed no acute process to explain symptoms.  Patient is anticipated discharge home today.    The severity of illness, intensity of service provided, expected LOS and risk for adverse outcome make the care appropriate for further observation; however, doesn't meet criteria for hospital inpatient admission. Dr. Loera notified of this determination by [facility] page.    This document was produced using voice recognition software.      The information on this document is developed by the utilization review team in order for the business office to ensure compliance.  This only denotes the appropriateness of " proper admission status and does not reflect the quality of care rendered.         The definitions of Inpatient Status and Observation Status used in making the determination above are those provided in the CMS Coverage Manual, Chapter 1 and Chapter 6, section 70.4.      Sincerely,    Kojo Segundo MD    Utilization Review  Physician Advisor  [facility].

## 2022-10-06 NOTE — H&P
"[facility]    Stroke Admission Note    Chief Complaint  Painless monocular vision loss    HPI  Aníbal Goldstein is a 67 year old recently COVID-positive male (symptom onset Saturday 10/1) with a history of limited doctoring, HLD w/o medical managment, who presents with intermittent painless vision loss in his right eye (except for some very mild R temporal tenderness).   Patient is presently asymptomatic.       Midmorning 10/4, he describes onset of \"fogginess\" in his right eye over a period of ~30 min, ultimately progressing to complete blackness in nearly all of his visual field in the right eye.  He does endorse \" some light coming through\" the extreme inferior portion of his visual field.  His left eye was not affected.  Over a period of around 2 hours, his symptoms completely resolved and his vision returned to baseline.  After discussing the situation with his daughter, who is a stroke NP with [employer] ([facility] [phone]), he presented to [facility] for evaluation later that afternoon, after symptoms had completely resolved.     Patient was seen by the Telestroke team, who recommended CT, CTA, MR brain, , intraocular pressure / retinal ultrasound, ESR/CRP.  No evidence of acute stroke or retinal artery occlusion was discovered, and findings were most significant for mild <50% b/l carotid artery stenosis.  CRP was mildly elevated (in c/o active COVID), without ESR elevation.  Symptoms did recur while in the [facility] ED for around 1.5h, but spontaneously resolved.  Transfer to [facility] for ophthalmology evaluation was recommended.    Patient drove self to [facility] and was seen by ophthalmology.  Per their evaluation, retina appears \"grossly well perfused,\" with GCA felt to be unlikely.  They are recommending stroke work-up per neurology, and we are admitting him to our service for this work-up.    Of note, patient had been taking OTC  daily " since [place] 10/2, at his daughter's recommendation given his recent COVID diagnosis.  Was prescribed Paxlovid, but did not pick it up.  Notably, his COVID symptoms have nearly resolved -- he only endorses an intermittent, mild dry cough and some sinus congestion.      Intravenous Thrombolysis  Not given due to:   - minor/isolated/quickly resolving symptoms    Endovascular Treatment  Not initiated due to absence of proximal vessel occlusion    Imaging Findings  #HEAD CT 10/4/22:  1.  No acute intracranial process.  2.  Incidentally noted right middle cranial fossa arachnoid cyst.  3.  Moderate mucosal thickening of the ethmoid air cells and left frontal sinus.     #HEAD CTA 10/4/22:   1.  Normal CTA [place].     #NECK CTA 10/4/22:  1.  Normal neck CTA.     #MR BRAIN 10/4/22:  IMPRESSION:  1.  No acute infarct or intracranial hemorrhage.  2.  T2 heterogeneous focus with associated susceptibility blooming artifact on gradient echo imaging, most consistent with a cavernous malformation.  3.  Arachnoid cyst along the right anterior cranial fossa.  4.  Generalized brain atrophy and presumed microvascular ischemic changes as detailed above.    Impression   Patient is a 67M with a h/o HLD per chart review (apparently was lost to follow-up and never started on a statin), limited doctoring, presenting with painless, near-complete, unilateral vision loss occurring in context of active COVID infection c/f BRAO vs CRAO.  Although symptoms have now resolved, recurrence of symptoms within 24 hours is concerning for impending re-occlusion.   Given this concern, and patients limited engagement with medical care / unknown risk factors, admission for further evaluation and optimization of stroke risk factors while in hospital (where we can rapidly administer thrombolytics if a retinal vessel occlusion re-develops), is indicated.    Of note, patient does complain of some mild temporal pain, in c/o a mildly elevated  which  "is relatively normal when adjusted for age and occurs in c/o resolving COVID infection -- felt to not be concerning for GCA by opthalmology (we concur).    Plan  #Painless monocular vision loss  #BRAO vs CRAO vs (less likely) ocular migraine vs GCA vs   Suspect (now-resolved) BRAO vs sub-complete CRAO (given perception of \"some light peeking through\" his inferior visual field.  Despite  elevation, very mild temporal pain, GCA felt to be unlikely given low grade  elevation relatively normal for age, occurring in c/o COVID infection.  Will work-up for stroke risk factors as below.  ABCD2 score = 4  - Neurochecks and Vital Signs every 4 hours   - Daily aspirin 81 mg for secondary stroke prevention  - Statin: pending LDL  - 24-hour Telemetry  - Cardiac Echo in AM  - INR/PTT WNL  - Bedside Glucose Monitoring  - A1c, Lipid Panel  - Rehab (PT/OT/SLP) services NOT required due to lack of ongoing deficit  - Stroke Education  - Depression Screen  - Apnea screening questions  - Euthermia, Euglycemia    #HLD  LDL in 2009 178, 110 in 4/2021, c/o central obesity.  Patient lost to follow-up, no h/o statin therapy.  -Recheck LDL, statin as indicated    #HTN, suspect essential vs white-coat  HTN to 170s systolic observed in ED, however subsequently normotensive.  Review of (very limited) medical records presently available indicates he has been near-normotensive at prior contacts (was seen in March and April of 2021, with BP in 120s, 130s systolic, no antihypertensive was started).    -q4h vital checks  -Consider antihypertensive pending further data  -Labetalol, hydralazine PRNs available for SBP > 180    #COVID  Symptoms nearly resolved.  Very intermittent, dry cough, with mild subjective SOB with exertion.  Saturating well on RA.  -Continuous oximetry       Prophylaxis            For VTE Prevention:  - pneumatic compression device  - ambulation (patient is low-risk and no additional VTE prophylaxis is needed)    For Acid " Suppression:  - GI prophylaxis is not indicated    Code Status  Full Code    During initial physical assessment, the plan of care was discussed and developed with patient and child.  Plan of care includes: Admission for stroke risk factor work-up and optimization.    Patient was admitted via [facility] ED ([place])    The patient will be admitted to the stroke team.    Patient discussed with my supervising physician, Dr. Loera, who agrees with the critical findings, assessment, and plan as documented in the note above, or as otherwise noted in their attestation or staff note.      Jassi Crawford MD, MS  PGY-1, Neurology      ___________________________________________________    Nutrition: Passed bedside MD swallow eval, regular diet.    Clinically Significant Risk Factors Present on Admission                       Past Medical History   Past Medical History:   Diagnosis Date     Cervicalgia 1/2008     Pain in joint, shoulder region 1/2008     Past Surgical History   Past Surgical History:   Procedure Laterality Date     COLONOSCOPY       COLONOSCOPY WITH CO2 INSUFFLATION N/A 5/17/2021    Procedure: COLONOSCOPY, WITH CO2 INSUFFLATION;  Surgeon: Elder Bates DO;  Location: [facility] OR     HC KNEE SCOPE,MED/LAT MENISECTOMY  2/9/2009    Right knee     HERNIA REPAIR, INGUINAL RT/LT      lt ?     RELEASE CARPAL TUNNEL Right 3/1/2018    Procedure: RELEASE CARPAL TUNNEL;  Right carpal tunnel release;  Surgeon: Britney Delgado MD;  Location: [facility] OR     ROTATOR CUFF REPAIR RT/LT  02/18/10    Right side. Done at [facility].     Medications   Home Meds  Prior to Admission medications    Medication Sig Start Date End Date Taking? Authorizing Provider   Acetaminophen (TYLENOL PO)     Reported, Patient   Na Sulfate-K Sulfate-Mg Sulf (SUPREP BOWEL PREP KIT) solution Take 354 mLs (2 Bottles) by mouth See Admin Instructions -Evening before procedure complete steps 1 through 4 (see package) using one 6  ounce bottle before bed.  Morning of procedure, repeat steps 1 through 4 using the other 6 ounce bottle. 5/10/21   Elder Bates DO   nirmatrelvir and ritonavir (PAXLOVID) therapy pack Take 3 tablets by mouth 2 times daily for 5 days (Take 2 Nirmatrelvir tablets and 1 Ritonavir tablet twice daily for 5 days) 10/3/22 10/8/22  Pepe Irwin MD   Simethicone 125 MG TABS Take 125 mg by mouth See Admin Instructions -Take tablet after finishing second half of Suprep with half a glass of water.  Patient not taking: Reported on 10/4/2022 5/10/21   Elder Bates DO   spacer (OPTICHAMBER FOX) holding chamber Use with inhaler 4/2/21   Monse Zelaya PA-C       Scheduled Meds    aspirin  81 mg Oral Daily       Infusion Meds  None    PRN Meds      Allergies   Allergies   Allergen Reactions     No Known Allergies      Family History   Family History   Problem Relation Age of Onset     Diabetes Father         Adult onset     Alzheimer Disease Father      Alzheimer Disease Mother      Colon Cancer No family hx of      Prostate Cancer No family hx of      Breast Cancer No family hx of      Ulcerative Colitis No family hx of      Crohn's Disease No family hx of      Social History   Social History     Tobacco Use     Smoking status: Never Smoker     Smokeless tobacco: Never Used   Vaping Use     Vaping Use: Never used   Substance Use Topics     Alcohol use: Yes     Alcohol/week: 1.7 standard drinks     Comment: 0-1 per week      Drug use: No       Review of Systems   The 10 point Review of Systems is negative other than noted in the HPI or here.        PHYSICAL EXAMINATION  Temp:  [97.9  F (36.6  C)-98.7  F (37.1  C)] 97.9  F (36.6  C)  Pulse:  [73-88] 82  Resp:  [16-18] 16  BP: (115-162)/() 161/99  SpO2:  [96 %-97 %] 96 %    General: Seated comfortably in no acute distress.  HEENT: Neck supple with normal range of motion, no visible abnormality.  Skin: No rashes on exposed skin.  Abdomen: Obese, non-distended,  non-tender.  Cardiopulmonary: RRR  Pulmonary: CTAB  Extremities: Intact, no trauma.  Skin: No lesions on visible skin.    Neurologic:     Mental Status: Fully alert, attentive and oriented. Normal memory and fund of knowledge. Language unremarkable, speech clear and fluent, with no paraphasic errors.     Cranial Nerves: Pupils directly and consensually reactive bilaterally, negative swinging flashlight test. Extraocular movements intact with normal smooth pursuit, no nystagmus.  Facial movements symmetric. Hearing intact to conversation. Tongue movements and soft palate elevation intact, without fasciculations.  No dysarthria. Shoulder shrug strong.     Motor: No tremors or other abnormal movements observed. Strength 5/5 in all extremities.       Reflexes: 2+ at achillies, patella, biceps, brachioradialis.  Toes down going      Sensory: Negative Romberg. Intact to pinprick, vibration in b/l upper and lower extremities.      Coordination: Finger-nose-finger without dysmetria.  No disdiadochokinesia.     Gait: Normal, steady casual gait.  Tandem gait without deficit.     Dysphagia Screen  Per Nursing    Stroke Scales    NIHSS  1a. Level of Consciousness 0-->Alert, keenly responsive   1b. LOC Questions 0-->Answers both questions correctly   1c. LOC Commands 0-->Performs both tasks correctly   2.   Best Gaze 0-->Normal   3.   Visual 0-->No visual loss   4.   Facial Palsy 0-->Normal symmetrical movements   5a. Motor Arm, Left 0-->No drift, limb holds 90 (or 45) degrees for full 10 secs   5b. Motor Arm, Right 0-->No drift, limb holds 90 (or 45) degrees for full 10 secs   6a. Motor Leg, Left 0-->No drift, leg holds 30 degree position for full 5 secs   6b. Motor Leg, right 0-->No drift, leg holds 30 degree position for full 5 secs   7.   Limb Ataxia 0-->Absent   8.   Sensory 0-->Normal, no sensory loss   9.   Best Language 0-->No aphasia, normal   10. Dysarthria 0-->Normal   11. Extinction and Inattention  0-->No  abnormality   Total 0 (10/05/22 1900)       Modified [place] Score (Pre-morbid)  0-No deficits    Lab Results Data   CBC  Recent Labs   Lab 10/04/22  1627   WBC 4.4   RBC 4.86   HGB 15.0   HCT 43.9        Basic Metabolic Panel   Recent Labs   Lab 10/04/22  1627 10/04/22  1539     --    POTASSIUM 3.9  --    CHLORIDE 106  --    CO2 29  --    BUN 17  --    CR 0.77  --    GLC 90 118*   OMER 8.1*  --      Liver Panel  No results for input(s): PROTTOTAL, ALBUMIN, BILITOTAL, ALKPHOS, AST, ALT, BILIDIRECT in the last 168 hours.  INR    Recent Labs   Lab Test 10/04/22  1627   INR 0.99      Lipid Profile    Recent Labs   Lab Test 04/07/21  1152   CHOL 206*   HDL 65   *   TRIG 154*     A1C  No lab results found.  Troponin    Recent Labs   Lab 10/04/22  1627   TROPONINIS 6          Stroke Code / Stroke Consult Data Data    Not a stroke code

## 2022-10-06 NOTE — LETTER
10/6/2022       RE: Aníbal Goldstein  [address] [facility] [address]     Dear Colleague,    Thank you for referring your patient, Aníbal Goldstein, to the [facility] [place] at [facility]. Please see a copy of my visit note below.      HPI:   Aníbal Goldstein is a 67 year old male who is currently admitted to the hospital who presents to clinic for one day follow up. He experienced two episodes of transient right eye vision loss that happened at 12pm and 7pm on 10/4. He presented to outside ED and underwent stroke workup and was transferred to [place] for ophthalmology evaluation. He has not had any further episodes of vision loss since the first episode. He reports lights appear less bright in his right eye    Past Ocular history:   - Glasses: none  - Contact lens wear: none  - Ocular Surgical History: none  - Current Eye drops:  none    PMH: HLD not on statin, current covid infx diagnosed 10/2    FH: No family hx of glaucoma or AMD    Review of systems for the eyes was negative other than the pertinent positives/negatives listed in the HPI.      Imaging:   CT Head 10/4/22  IMPRESSION:   HEAD CT:  1.  No acute intracranial process.  2.  Incidentally noted right middle cranial fossa arachnoid cyst.  3.  Moderate mucosal thickening of the ethmoid air cells and left frontal sinus.     HEAD CTA:   1.  Normal CTA [place].     NECK CTA:  1.  Normal neck CTA.     MRI Brain 10/4/22  IMPRESSION:  1.  No acute infarct or intracranial hemorrhage.  2.  T2 heterogeneous focus with associated susceptibility blooming artifact on gradient echo imaging, most consistent with a cavernous malformation.  3.  Arachnoid cyst along the right anterior cranial fossa.  4.  Generalized brain atrophy and presumed microvascular ischemic changes as detailed above.    Assessment & Plan     Aníbal Goldstein is a 67 year old male with the following  diagnoses:   1. Transient visual loss of right eye         Patient is a 66 yo male with no ocular history and no significant past medical history who presents for one day follow up of transient right eye vision loss. He had two episodes of right eye vision loss on 10/4. He is currently admitted to the hospital for stroke workup which has so far been negative. MRI brain without signs of acute infarct. CTA without carotid stenosis. LDL borderline elevated at 199. BP elevated to 170s in the ED but has trended downward to 120-130 systolic since admission. Etiology of transient right eye vision loss is unclear but differential includes CRAO vs BRAO vs GCA vs ischemic optic neuropathy vs amaurosis fugax. See consult note from 10/5 for further discussion but repeat exam today is consistent from yesterdays exam. Best corrected visual acuity is 20/20 both eyes. No RAPD. No pathology or retinal whitening on fundus exam. Unable to perform further testing in clinic today due to acute covid infection but exam is reassuring and does not support diagnosis as listed above.    Recommendations:  - continue stroke workup per Neurology recommendations  - can consider calcium channel blocker for BP control which may address possible vasospasm component resulting in transient vision loss  - follow up in Ophthalmology clinic in 2-4 weeks      Patient disposition:   Return to inpatient  Follow up with ophthalmology in 2-4 weeks for dilated exam    Patient seen with Dr. Aby Hernandez MD  Resident Physician, PGY-2  Department of Ophthalmology  10/06/22 8:53 AM    Attending Physician Attestation:  Complete documentation of historical and exam elements from today's encounter can be found in the full encounter summary report (not reduplicated in this progress note).  I personally obtained the chief complaint(s) and history of present illness.  I confirmed and edited as necessary the review of systems, past medical/surgical history,  family history, social history, and examination findings as documented by others; and I examined the patient myself.  I personally reviewed the relevant tests, images, and reports as documented above.  I formulated and edited as necessary the assessment and plan and discussed the findings and management plan with the patient and family. . - Fracisco Badillo MD

## 2022-10-06 NOTE — PLAN OF CARE
Arrived from: ED  Belongings/meds: clothing, shoes, phone, , wallet  2 RN Skin Assessment Completed by: Rodri    Non-intact findings documented (yes/no/NA): No    Status: admitted for stroke work up following two episodes of transient L vision loss   Vitals: Hypertensive within parameters, on RA, infrequently radha to 58 while asleep. On CCM.  Neuros: AOx4, no vision loss, 5/5 throughout  IV: PIV SL  Labs/Electrolytes: COVID+, A1c 5.9, elevated cholesterol   Resp/trach: LS clear, infrequent dry cough   Diet: Regular, good appetite  Bowel status: LBM 10/5  : Voiding without difficulty   Skin: Intact, CCM leads in place  Pain: Denies   Activity: Independent  Social: Updating family independently   Plan: Eye exam this morning, and echocardiogram      Stroke Patients:   PLC scheduled or completed: ordered  Pneumoboots in place: ordered

## 2022-10-06 NOTE — DISCHARGE SUMMARY
"[facility]    Neurology Stroke Discharge Summary    Date of Admission: 10/5/2022  Date of Discharge: 10/06/2022    Disposition: Discharged to home  Primary Care Physician: Morgan Fierro      Admission Diagnosis:   COVID    Discharge Diagnosis:   -Amaurosis fugax  -Essential Hypertension  -Hyperlipidemia  -Prediabetes     Problem Leading to Hospitalization (from [facility]):   Aníbal Goldstein is a 67 year old recently COVID-positive male (symptom onset Saturday 10/1) with a history of limited doctoring, HLD w/o medical managment, who presents with intermittent painless vision loss in his right eye (except for some very mild R temporal tenderness).   Patient is presently asymptomatic.       Midmorning 10/4, he describes onset of \"fogginess\" in his right eye over a period of ~30 min, ultimately progressing to complete blackness in nearly all of his visual field in the right eye.  He does endorse \" some light coming through\" the extreme inferior portion of his visual field.  His left eye was not affected.  Over a period of around 2 hours, his symptoms completely resolved and his vision returned to baseline.  After discussing the situation with his daughter, who is a stroke NP with [employer] ([facility] [phone]), he presented to [facility] for evaluation later that afternoon, after symptoms had completely resolved.     Patient was seen by the Telestroke team, who recommended CT, CTA, MR brain, , intraocular pressure / retinal ultrasound, ESR/CRP.  No evidence of acute stroke or retinal artery occlusion was discovered, and findings were most significant for mild <50% b/l carotid artery stenosis.  CRP was mildly elevated (in c/o active COVID), without ESR elevation.  Symptoms did recur while in the [facility] ED for around 1.5h, but spontaneously resolved.  Transfer to [facility] for ophthalmology evaluation was recommended.    Patient drove self to [facility] and was " "seen by ophthalmology.  Per their evaluation, retina appears \"grossly well perfused,\" with GCA felt to be unlikely.  They are recommending stroke work-up per neurology, and we are admitting him to our service for this work-up.    Of note, patient had been taking OTC  daily since [place] 10/2, at his daughter's recommendation given his recent COVID diagnosis.  Was prescribed Paxlovid, but did not pick it up.  Notably, his COVID symptoms have nearly resolved -- he only endorses an intermittent, mild dry cough and some sinus congestion.      Please see H&P dated 10/5/2022 for further details about presentation.    Brief Hospital Course:   Upon presentation to [facility] patient was seen by the stroke neurology team, ophthalmology and was admitted to the stroke neurology service.  Findings and physical exam as described below.  In brief, patient was asymptomatic at presentation and remained so throughout his brief hospitalization.      Found to have bilateral carotid atherosclerosis <50%, no evidence of contributory cardiac pathology (did show some concentric hypertrophy c/w longstanding HTN).  Otherwise, evaluation at [facility] was unremarkable with additional positive findings beyond those performed at OSH described below.     Plan:  -Follow-up with PCP w/i 1 week, GenNeuro 4-6 weeks, Ophthalmology 2 weeks  -Start  daily, atorvastatin 40 daily, lisinopril 10 daily    IV thrombolysis was not performed due to absence of proximal vessel occlusion.      Work-up as stated below under Pertinent Investigations.    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      Rehab evaluation: No rehab services required due to lack of ongoing deficit.     Smoking Cessation: patient is not a smoker    BP Long-term Goal: 140/90 or less    Antithrombotic/Anticoagulant Agent: aspirin 325 mg    Statins: Started on atorvastatin 40       Hgb A1C Goal: < 7.0    Complications: None.     Other problems addressed during this " hospitalization:  None    PERTINENT INVESTIGATIONS    Labs  Lipid Panel: Recent Labs   Lab Test 10/05/22  2129   CHOL 199   HDL 51   *   TRIG 139     A1C:   Lab Results   Component Value Date    A1C 5.9 10/05/2022     INR:   Recent Labs   Lab 10/04/22  1627   INR 0.99      Coag Panel / Hypercoag Workup: INR 0.99 PTT 26, further work-up not indicated  Pending test results: Cardiac monitor    Echo:   Global and regional left ventricular function is normal with an EF of 60-65%.  Mild concentric wall thickening consistent with left ventricular hypertrophy  is present.  Global right ventricular function is normal.  No significant valvular abnormalities present.  There was no shunt at the atrial septal level as assessed by agitated saline  bubble study at rest and with Valsalva maneuver.  Previous study not available for comparison.      Imaging:   CT Head 10/4/22  IMPRESSION:   HEAD CT:  1.  No acute intracranial process.  2.  Incidentally noted right middle cranial fossa arachnoid cyst.  3.  Moderate mucosal thickening of the ethmoid air cells and left frontal sinus.     HEAD CTA:   1.  Normal CTA [place].     NECK CTA:  1.  Normal neck CTA.     MRI Brain 10/4/22  IMPRESSION:  1.  No acute infarct or intracranial hemorrhage.  2.  T2 heterogeneous focus with associated susceptibility blooming artifact on gradient echo imaging, most consistent with a cavernous malformation.  3.  Arachnoid cyst along the right anterior cranial fossa.  4.  Generalized brain atrophy and presumed microvascular ischemic changes as detailed above.      Endovascular procedure: None     Cardiac Monitoring: Patient had > 24 hrs of cardiac monitor while in hospital.    Findings: No atrial fibrillation was found. and Patient discharged with referral for 30 day home cardiac monitor    Sleep Apnea Screen:   Questions/Answers      1. Prior to your stroke, have you been told that you snore? Yes.    2. Prior to your stroke, have you been  told that you struggle to breath while you are sleeping? No.    3. Prior to your stroke, do you feel tired and sleepy even after getting a normal night of sleep? No.    Sleep Apnea Screen Findings: Patient has 0-1 symptoms of sleep apnea.  Further sleep study is not recommended at this time.    PHQ-9 Depression Screen Score: 0    Education discussed with: patient on blood pressure management, cholesterol management, medical management, follow-up recommendations/plan, 911 call if warning signs of stroke and results (HLD, carotid stenosis, HTN).    During daily rounds, the plan of care was discussed and developed with patient.  Plan of care includes: 30 day cardiac monitor, follow-up with PCP within 1 week, GenNeuro 4-6 weeks, opthalmology 2 weeks per their recommendations.    PHYSICAL EXAMINATION  Vital Signs:  B/P: 155/78, T: 97.8, P: 81, R: 18    General:  patient lying in bed without any acute distress     HEENT:  normocephalic/atraumatic  Cardio:  RRR  Pulmonary:  no respiratory distress  Abdomen:  soft  Extremities:  no edema  Skin:  intact     Neurologic  Mental Status:  fully alert, attentive and oriented, follows commands, speech clear and fluent  Cranial Nerves:  visual fields intact, PERRL, EOMI with normal smooth pursuit, facial sensation intact and symmetric, facial movements symmetric, hearing not formally tested but intact to conversation, palate elevation symmetric and uvula midline, no dysarthria, shoulder shrug strong bilaterally, tongue protrusion midline  Motor:  no abnormal movements, normal tone throughout, normal muscle bulk, no pronator drift, normal and symmetric rapid finger tapping, able to move all limbs spontaneously, strength 5/5 throughout upper and lower extremities  Reflexes:  2+ and symmetric throughout  Sensory:  intact/symmetric to light touch and pin prick throughout upper and lower extremities  Coordination:  FNF and HS intact without dysmetria  Station/Gait:  normal width, stride  length, turn, and arm swing     National Institutes of Health Stroke Scale (on day of discharge)  NIHSS Total Score: 0    0    Medications    Current Discharge Medication List      START taking these medications    Details   aspirin (ASA) 325 MG EC tablet Take 1 tablet (325 mg) by mouth daily  Qty: 90 tablet, Refills: 3    Associated Diagnoses: Transient visual loss, right      atorvastatin (LIPITOR) 40 MG tablet Take 1 tablet (40 mg) by mouth every evening  Qty: 90 tablet, Refills: 3    Associated Diagnoses: Transient visual loss, right; Mixed hyperlipidemia      lisinopril (ZESTRIL) 10 MG tablet Take 1 tablet (10 mg) by mouth daily  Qty: 90 tablet, Refills: 3    Associated Diagnoses: Transient visual loss, right; Benign essential hypertension         STOP taking these medications       Acetaminophen (TYLENOL PO) Comments:   Reason for Stopping:               Additional recommendations and follow up:       Primary Care Referral      Reason for your hospital stay    You were seen for a condition called amaurosis fugax, which you can think of as a stroke in your eye which has gone away.  However, it could come back.  It is very important to take your medications as prescribed, and follow-up with your doctors as directed.     Activity    Your activity upon discharge: activity as tolerated     Follow Up ([facility])    Follow-up with PCP within 7 days (referral placed), follow-up with general neurology in 4-6 weeks,  your cardiac event monitor from [facility], follow-up with ophthalmology as directed by their service    Appointments on [place] and/or [facility] (with [facility] or [facility] provider or service). Call [phone] if you haven't heard regarding these appointments within 7 days of discharge.     Cardiac Event Monitor Adult/Pediatric    PLEASE PROVIDE PRIOR TO DISCHARGE     Diet    Follow this diet upon discharge: Orders Placed This Encounter      Regular Diet Adult     Stroke Hospital  Follow Up    Please be aware that coverage of these services is subject to the terms and limitations of your health insurance plan.  Call member services at your health plan with any benefit or coverage questions.  [employer] will call you to coordinate care as prescribed by your provider. If you don t hear from a representative within 2 business days, please call [phone].         Patient discussed with my supervising physician, Dr. Loera, who agrees with the critical findings, assessment, and plan as documented in the note above, or as otherwise noted in their attestation or staff note.      Jassi Crawford MD, MS  PGY-1, Neurology

## 2022-10-06 NOTE — PHARMACY-ADMISSION MEDICATION HISTORY
Admission Medication History Completed by Pharmacy    See [facility] Admission Navigator for allergy information, preferred outpatient pharmacy, prior to admission medications and immunization status.     Medication History Sources:     Patient interview    Fill history     Changes made to PTA medication list (reason):    Added: None    Deleted: bowel prep medication(colonoscopy occurred several months ago), PAXLOVID (never picked up, past  date), Simethicone (Hasnt used in months), Spacer (doesn't know last time he had inhaler)     Changed: None    Additional Information:    Patient wasn't taking any prescription medication at home, prior to arrival. Reported rare pain reliever usage (tylenol was already on profile)     Prior to Admission medications    Medication Sig Last Dose Taking? Auth Provider Long Term End Date   Acetaminophen (TYLENOL PO)  More than a month at Unknown time Yes Reported, Patient         Date completed: 10/06/22    Medication history completed by: Joyce Lin RPH

## 2022-10-06 NOTE — PROGRESS NOTES
Care Management Initial Consult    General Information  Assessment completed with:  (Patient), Patient  Type of CM/SW Visit: Initial Assessment    Primary Care Provider verified and updated as needed: Yes   Readmission within the last 30 days: no previous admission in last 30 days         Advance Care Planning:    Pt does not have a health care directive       Communication Assessment  Patient's communication style: spoken language (English or Bilingual)    Hearing Difficulty or Deaf: no   Wear Glasses or Blind: no    Cognitive  Cognitive/Neuro/Behavioral: (P) WDL  Level of Consciousness: (P) alert  Arousal Level: (P) opens eyes spontaneously  Orientation: (P) oriented x 4     Best Language: (P) 0 - No aphasia  Speech: (P) spontaneous, clear    Living Environment:   People in home:  (Patient and wife (Sheila))     Current living Arrangements: house      Able to return to prior arrangements: yes       Family/Social Support:  Care provided by: self  Provides care for: no one  Marital Status:   Wife, Children  Sheila       Description of Support System: Supportive    Support Assessment: Adequate family and caregiver support    Current Resources:   Patient receiving home care services: No     Community Resources: None  Equipment currently used at home:  (Pt has the following DME available at home if needed:  walker, cane, shower chair and heightened toilet)  Supplies currently used at home:      Employment/Financial:  Employment Status:  (Self employed)     Employment/ Comments: Pt did not serve in the   Financial Concerns:     Referral to Financial Worker: No       Lifestyle & Psychosocial Needs:  Social Determinants of Health     Tobacco Use: Low Risk      Smoking Tobacco Use: Never Smoker     Smokeless Tobacco Use: Never Used   Alcohol Use: Not on file   Financial Resource Strain: Not on file   Food Insecurity: Not on file   Transportation Needs: Not on file   Physical Activity: Not on file    Stress: Not on file   Social Connections: Not on file   Intimate Partner Violence: Not on file   Depression: Not at risk     PHQ-2 Score: 0   Housing Stability: Not on file       Functional Status:  Prior to admission patient needed assistance:   Dependent ADLs::  (Indep with all mobility and adl's prior to current hospitalization)  Dependent IADLs::  (Shared responsibility for completion of IADL's with wife)       Mental Health Status:  Mental Health Status:  (No history of MI and no current concerns related to MI)       Chemical Dependency Status:  Chemical Dependency Status:  (No history of CD and no current concerns related to CD)             Values/Beliefs:  Spiritual, Cultural Beliefs, Sabianism Practices, Values that affect care: yes          Values/Beliefs Comment: Buddhist and member of a Buddhist Yazidi    Additional Information:  SW phoned pt in room to complete initial assessment due to Covid positive status.  Pt lives with his wife (Sheila) in a single family home.  There is one step to enter the home from the garage entrance.  Pt's bed, bath (tub/shower combo and step in shower) and laundry are on the main floor.   Pt states that prior to current hospitalization, he was indep with all mobility (no assistive device, 1-2 falls in the past year without fracture), adl's and with medication administration.  Pt and wife share responsibility for completion of housekeeping, laundry, cooking, shopping and household financial mgnt tasks.  Pt drives.  Pt has a shower chair, walker, cane available to him.  Pt has heightened toilet.  Pt was not receiving skilled home care visits.  Pt does not have a known .  Pt was not utilizing community resources.  Pt's PCP is Dr. Fierro.  Pt [facility] at [facility].  Pt has not had add'l hospitalizations in the past 30 days.  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  Pt did not  serve in the .  Pt states that his Faith alexey is important to him.  Pt states that his support system includes his wife, daughters (Danica resides in [place] and Lashaun resides in [place]), and 5 grandchildren.       Disposition planning was discussed.  Return to home is anticipated and per Dr. Crawford, discharge is anticipated today.   Pt denies any concerns or needs related to returning home.  Pt is hopeful that his vision changes will improve/resolve.  Pt states that one of his daughters is a NP in a neuro clinic and the daughter plans to speak with [facility] MD today.    No further SW intervention planned at this time as discharge to home is anticipated. SW available should add'l needs arise.      LOVE Griffin  Social Work, 6A  Phone:  [phone]  Pager:  [phone]  10/6/2022        LUIS Winters

## 2022-10-07 ENCOUNTER — HOSPITAL ENCOUNTER (OUTPATIENT)
Dept: EDUCATION SERVICES | Facility: CLINIC | Age: 67
Discharge: HOME | End: 2022-10-07
Attending: STUDENT IN AN ORGANIZED HEALTH CARE EDUCATION/TRAINING PROGRAM
Payer: COMMERCIAL

## 2022-10-07 NOTE — CONSULTS
Stroke Education Note    The following information has been reviewed with the patient:    1. Warning signs of stroke    2. Calling 911 if having warning signs of stroke    3. All modifiable risk factors: hypertension, CAD, atrial fib, diabetes, hypercholesterolemia, smoking, substance abuse, diet, physical inactivity, obesity, sleep apnea.    4. Patient's risk factors for stroke which include: HTN, HLD, pre-diabetes, obesity    5. Follow-up plan for after discharge    6. Discharge medications which include: ASA, Lipitor, lisinopril    In addition, the above information was given to the patient in writing as a part of the [facility] Stroke Class Handout.    Learner's response to risk factors / lifestyle modification education: Need to change     Ros White RN

## 2022-10-09 ENCOUNTER — HEALTH MAINTENANCE LETTER (OUTPATIENT)
Age: 67
End: 2022-10-09

## 2022-10-10 ENCOUNTER — TELEPHONE (OUTPATIENT)
Dept: OPHTHALMOLOGY | Facility: CLINIC | Age: 67
End: 2022-10-10

## 2022-10-10 NOTE — TELEPHONE ENCOUNTER
Called and spoke to Aníbal griffith an appointment with Dr. Hernandez for 11/2 @ 730 am     Provided the clinic address     Rehana Bañuelos Communication Facilitator on 10/10/2022 at 9:09 AM

## 2022-10-10 NOTE — TELEPHONE ENCOUNTER
[facility] Call Center    Phone Message    May a detailed message be left on voicemail: yes     Reason for Call: Other: Pt calling in to schedule 2-3 week f/u Appt with Dr. Hernandez. Sending TE per protocols for scheduling with residents. Thank you.     Action Taken: Message routed to:  [facility] EYE    Travel Screening: Not Applicable

## 2022-10-14 ENCOUNTER — OFFICE VISIT (OUTPATIENT)
Dept: FAMILY MEDICINE | Facility: CLINIC | Age: 67
End: 2022-10-14
Payer: COMMERCIAL

## 2022-10-14 VITALS
HEIGHT: 67 IN | WEIGHT: 212 LBS | DIASTOLIC BLOOD PRESSURE: 74 MMHG | SYSTOLIC BLOOD PRESSURE: 129 MMHG | OXYGEN SATURATION: 96 % | BODY MASS INDEX: 33.27 KG/M2 | HEART RATE: 79 BPM | RESPIRATION RATE: 12 BRPM | TEMPERATURE: 98 F

## 2022-10-14 DIAGNOSIS — R73.03 PREDIABETES: ICD-10-CM

## 2022-10-14 DIAGNOSIS — Z09 HOSPITAL DISCHARGE FOLLOW-UP: Primary | ICD-10-CM

## 2022-10-14 DIAGNOSIS — E78.2 MIXED HYPERLIPIDEMIA: Chronic | ICD-10-CM

## 2022-10-14 DIAGNOSIS — H53.121 TRANSIENT VISUAL LOSS, RIGHT: ICD-10-CM

## 2022-10-14 DIAGNOSIS — I10 BENIGN ESSENTIAL HYPERTENSION: Chronic | ICD-10-CM

## 2022-10-14 PROCEDURE — 99214 OFFICE O/P EST MOD 30 MIN: CPT | Performed by: FAMILY MEDICINE

## 2022-10-14 RX ORDER — LISINOPRIL 10 MG/1
5 TABLET ORAL DAILY
Qty: 90 TABLET | Refills: 3
Start: 2022-10-14 | End: 2023-10-18

## 2022-10-14 ASSESSMENT — PAIN SCALES - GENERAL: PAINLEVEL: NO PAIN (0)

## 2022-10-14 NOTE — PROGRESS NOTES
"  {PROVIDER CHARTING PREFERENCE:889445}    Subjective   nAíbal is a 67 year old accompanied by his self, presenting for the following health issues:  Hospital F/U    HPI     ED/UC Followup:    [facility]  Date of visit: 10/04/2022  Reason for visit: Vision loss, mixed hyperlipidemia, covid  Current Status: Home, non symptomatic    {additonal problems for provider to add (Optional):995106}    Review of Systems   Constitutional, HEENT, cardiovascular, pulmonary, GI, , musculoskeletal, neuro, skin, endocrine and psych systems are negative, except as otherwise noted.      Objective    /74   Pulse 79   Temp 98  F (36.7  C) (Temporal)   Resp 12   Ht 1.71 m (5' 7.32\")   Wt 96.2 kg (212 lb)   SpO2 96%   BMI 32.89 kg/m    Body mass index is 32.89 kg/m .  Physical Exam   GENERAL: healthy, alert and no distress  EYES: Eyes grossly normal to inspection, PERRL and conjunctivae and sclerae normal  NECK: no adenopathy, no asymmetry, masses, or scars and thyroid normal to palpation  RESP: lungs clear to auscultation - no rales, rhonchi or wheezes  CV: Holter monitor in place.  Regular rate and rhythm, normal S1 S2, no S3 or S4, no murmur, click or rub, no peripheral edema and peripheral pulses strong  MS: no gross musculoskeletal defects noted, no edema  SKIN: no suspicious lesions or rashes over exposed surfaces.  NEURO: Normal strength and tone, mentation intact and speech normal  PSYCH: mentation appears normal, affect normal/bright    Labs: None            "

## 2022-10-14 NOTE — PROGRESS NOTES
Assessment & Plan   1. Hospital discharge follow-up: See plan below. Resolved symptoms. Tolerating medication regiment.    2. Transient visual loss, right: No return of symptoms.  Questionable CVA/TIA.  No treating for secondary prevention.  Good blood pressure control shown.  Patient shown to have prediabetes and will continue to monitor annually with A1c.  Has upcoming follow-up with ophthalmology and neurology.  Tolerating statin therapy.  - Primary Care Referral  - lisinopril (ZESTRIL) 10 MG tablet; Take 0.5 tablets (5 mg) by mouth daily  Dispense: 90 tablet; Refill: 3    3. Benign essential hypertension: Controlled on 5 mg of lisinopril.  Continue on current regimen.  Patient was recently started this upon discharge.  Would like to recheck BMP in approximately 2 weeks to monitor renal function and potassium.  - Primary Care Referral  - lisinopril (ZESTRIL) 10 MG tablet; Take 0.5 tablets (5 mg) by mouth daily  Dispense: 90 tablet; Refill: 3  - Basic metabolic panel  (Ca, Cl, CO2, Creat, Gluc, K, Na, BUN); Future    4. Mixed hyperlipidemia: Continue on high intensity statin therapy for secondary prevention.  - Primary Care Referral    5. Prediabetes: A1c of 5.9.  Recommend monitoring annually.      Return in about 2 weeks (around 10/28/2022) for Lab Work.    Jaime Navarrete MD  [employer]    This chart is completed utilizing dictation software; typos and/or incorrect word substitutions may unintentionally occur.       Alida Spangler is a 67 year old, presenting for the following health issues:  Hospital F/U    [facility]     Hospital Follow-up Visit:    Hospital/Nursing Home/IP Rehab Facility: [facility]  Date of Admission: 10/5/2022  Date of Discharge: 10/6/2022  Reason(s) for Admission: -Amaurosis fugax    Was your hospitalization related to COVID-19? No   Problems taking medications regularly:  None  Medication changes since  "discharge: Due to lower blood pressures and lightheadedness patient has decreased his lisinopril down to 5 mg from the prescribed 10 mg in conjunction with his nurse practitioner daughter.  Problems adhering to non-medication therapy:  None    Summary of hospitalization:  [facility] discharge summary reviewed  Diagnostic Tests/Treatments reviewed.  Follow up needed: none  Other Healthcare Providers Involved in Patient s Care:         Specialist appointment - Ophthalmology and neurology  Update since discharge: improved.   Post Medication Reconciliation Status: Discharge medications reconciled and changed, see notes/orders    Plan of care communicated with patient    \"Problem Leading to Hospitalization (from HPI):   Anbíal Goldstein is a 67 year old recently COVID-positive male (symptom onset Saturday 10/1) with a history of limited doctoring, HLD w/o medical managment, who presents with intermittent painless vision loss in his right eye (except for some very mild R temporal tenderness).   Patient is presently asymptomatic.       Midmorning 10/4, he describes onset of \"fogginess\" in his right eye over a period of ~30 min, ultimately progressing to complete blackness in nearly all of his visual field in the right eye.  He does endorse \" some light coming through\" the extreme inferior portion of his visual field.  His left eye was not affected.  Over a period of around 2 hours, his symptoms completely resolved and his vision returned to baseline.  After discussing the situation with his daughter, who is a stroke NP with [employer] ([place] [phone]), he presented to [facility] for evaluation later that afternoon, after symptoms had completely resolved.     Patient was seen by the Telestroke team, who recommended CT, CTA, MR brain, , intraocular pressure / retinal ultrasound, ESR/CRP.  No evidence of acute stroke or retinal artery occlusion was discovered, and findings were most " "significant for mild <50% b/l carotid artery stenosis.  CRP was mildly elevated (in c/o active COVID), without ESR elevation.  Symptoms did recur while in the [facility] ED for around 1.5h, but spontaneously resolved.  Transfer to [facility] for ophthalmology evaluation was recommended.    Patient drove self to [facility] and was seen by ophthalmology.  Per their evaluation, retina appears \"grossly well perfused,\" with GCA felt to be unlikely.  They are recommending stroke work-up per neurology, and we are admitting him to our service for this work-up.    Of note, patient had been taking OTC  daily since [place] 10/2, at his daughter's recommendation given his recent COVID diagnosis.  Was prescribed Paxlovid, but did not pick it up.  Notably, his COVID symptoms have nearly resolved -- he only endorses an intermittent, mild dry cough and some sinus congestion.       Please see H&P dated 10/5/2022 for further details about presentation.     Brief Hospital Course:   Upon presentation to [facility] patient was seen by the stroke neurology team, ophthalmology and was admitted to the stroke neurology service.  Findings and physical exam as described below.  In brief, patient was asymptomatic at presentation and remained so throughout his brief hospitalization.       Found to have bilateral carotid atherosclerosis <50%, no evidence of contributory cardiac pathology (did show some concentric hypertrophy c/w longstanding HTN).  Otherwise, evaluation at [facility] was unremarkable with additional positive findings beyond those performed at OSH described below.      Plan:  -Follow-up with PCP w/i 1 week, GenNeuro 4-6 weeks, Ophthalmology 2 weeks  -Start  daily, atorvastatin 40 daily, lisinopril 10 daily     IV thrombolysis was not performed due to absence of proximal vessel occlusion.       Work-up as stated below under Pertinent Investigations.     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       Rehab evaluation: No rehab " "services required due to lack of ongoing deficit.      Smoking Cessation: patient is not a smoker     BP Long-term Goal: 140/90 or less     Antithrombotic/Anticoagulant Agent: aspirin 325 mg     Statins: Started on atorvastatin 40        Hgb A1C Goal: < 7.0     Complications: None. \"      Today: Noticed lower blood pressures and lightheadedness with 10 mg lisinopril.  In conjunction with his nurse practitioner daughter they decrease his son to 5 mg.  He shows consistent blood pressures in the 110's over 50s to 80s when measured at home.  He has had no recurrence of symptoms nor side effects with current medications otherwise.    Review of Systems   Constitutional, HEENT, cardiovascular, pulmonary, GI, , musculoskeletal, neuro, skin, endocrine and psych systems are negative, except as otherwise noted.      Objective    /74   Pulse 79   Temp 98  F (36.7  C) (Temporal)   Resp 12   Ht 1.71 m (5' 7.32\")   Wt 96.2 kg (212 lb)   SpO2 96%   BMI 32.89 kg/m    Body mass index is 32.89 kg/m .  Physical Exam   GENERAL: healthy, alert and no distress  EYES: Eyes grossly normal to inspection, PERRL and conjunctivae and sclerae normal  NECK: no adenopathy, no asymmetry, masses, or scars and thyroid normal to palpation  RESP: lungs clear to auscultation - no rales, rhonchi or wheezes  CV: Holter monitor in place.  Regular rate and rhythm, normal S1 S2, no S3 or S4, no murmur, click or rub, no peripheral edema and peripheral pulses strong  MS: no gross musculoskeletal defects noted, no edema  SKIN: no suspicious lesions or rashes over exposed surfaces.  NEURO: Normal strength and tone, mentation intact and speech normal  PSYCH: mentation appears normal, affect normal/bright    Labs: none    Reviewed in patient labs, echo, and imaging.            "

## 2022-11-02 ENCOUNTER — OFFICE VISIT (OUTPATIENT)
Dept: OPHTHALMOLOGY | Facility: CLINIC | Age: 67
End: 2022-11-02
Payer: COMMERCIAL

## 2022-11-02 DIAGNOSIS — H53.121 TRANSIENT VISUAL LOSS OF RIGHT EYE: Primary | ICD-10-CM

## 2022-11-02 DIAGNOSIS — H25.13 NUCLEAR SCLEROSIS OF BOTH EYES: ICD-10-CM

## 2022-11-02 PROCEDURE — 92014 COMPRE OPH EXAM EST PT 1/>: CPT | Performed by: STUDENT IN AN ORGANIZED HEALTH CARE EDUCATION/TRAINING PROGRAM

## 2022-11-02 PROCEDURE — G0463 HOSPITAL OUTPT CLINIC VISIT: HCPCS

## 2022-11-02 ASSESSMENT — VISUAL ACUITY
OS_PH_SC: 20/20-2
OD_SC: 20/25+2
OS_SC: 20/30-2/+2
METHOD: SNELLEN - LINEAR

## 2022-11-02 ASSESSMENT — CONF VISUAL FIELD
OS_INFERIOR_NASAL_RESTRICTION: 0
OD_NORMAL: 1
OD_SUPERIOR_NASAL_RESTRICTION: 0
OS_NORMAL: 1
METHOD: COUNTING FINGERS
OS_SUPERIOR_NASAL_RESTRICTION: 0
OS_SUPERIOR_TEMPORAL_RESTRICTION: 0
OD_INFERIOR_TEMPORAL_RESTRICTION: 0
OS_INFERIOR_TEMPORAL_RESTRICTION: 0
OD_INFERIOR_NASAL_RESTRICTION: 0
OD_SUPERIOR_TEMPORAL_RESTRICTION: 0

## 2022-11-02 ASSESSMENT — EXTERNAL EXAM - LEFT EYE: OS_EXAM: NORMAL

## 2022-11-02 ASSESSMENT — CUP TO DISC RATIO
OD_RATIO: 0.3
OS_RATIO: 0.3

## 2022-11-02 ASSESSMENT — TONOMETRY
IOP_METHOD: TONOPEN
OS_IOP_MMHG: 15
OD_IOP_MMHG: 13

## 2022-11-02 ASSESSMENT — EXTERNAL EXAM - RIGHT EYE: OD_EXAM: NORMAL

## 2022-11-02 ASSESSMENT — SLIT LAMP EXAM - LIDS
COMMENTS: NORMAL
COMMENTS: NORMAL

## 2022-11-02 NOTE — PROGRESS NOTES
"Ophthalmology Acute Clinic     Chief Complaint(s) and History of Present Illness(es)     Transient Vision Loss Follow Up    In right eye.  Onset was sudden.  Presenting in central vision.  Severity is severe.  It is worse throughout the day.  Since onset it is stable.  Associated symptoms include tearing (occasional watering right eye).  Pain was noted as 0/10.           Comments    PT here for follow up for episodes of transient vision loss in right eye.  He has not had any further episodes.  His right eye occasionally tears.  Occasionally he feels like \"something is happening that is a little different\" on his right temple and then his right eye will water.   Yandy Dai, COT 7:28 AM 11/02/2022                       HPI:   Aníbal Goldstein is a 67 year old male who presents for four week follow up transient vision loss in the right eye. He has not had a further episode since hospitalization. He sometimes notices strange sensation in his temple associated with right eye watering but denies vision change. Denies headaches, scalp tenderness.    Past Ocular history:   - Glasses: none  - Contact lens wear: none  - Ocular Surgical History: none  - Current Eye drops:  none    PMH: HTN - stopped taking Lisinopril 5 mg daily last week due to dizziness, Lipitor,  mg    FH: No family hx of glaucoma or AMD    Review of systems for the eyes was negative other than the pertinent positives/negatives listed in the HPI.      Imaging:   CT Head 10/4/22  IMPRESSION:   HEAD CT:  1.  No acute intracranial process.  2.  Incidentally noted right middle cranial fossa arachnoid cyst.  3.  Moderate mucosal thickening of the ethmoid air cells and left frontal sinus.     HEAD CTA:   1.  Normal CTA [place] [facility] [place].     NECK CTA:  1.  Normal neck CTA.     MRI Brain 10/4/22  IMPRESSION:  1.  No acute infarct or intracranial hemorrhage.  2.  T2 heterogeneous focus with associated susceptibility blooming artifact on gradient echo " imaging, most consistent with a cavernous malformation.  3.  Arachnoid cyst along the right anterior cranial fossa.  4.  Generalized brain atrophy and presumed microvascular ischemic changes as detailed above.    Assessment & Plan      Aníbal Goldstein is a 67 year old male with the following diagnoses:   1. Transient visual loss of right eye     Patient is a 68 yo male with recent history of two episodes of transient right eye vision loss that occurred about a month ago. He was admitted for stroke workup during these events which was negative for acute stroke. No carotid vessel stenosis on CTA. ESR and CRP normal. LDL borderline elevated to 199, started on Lipitor. He was started on Lisinopril 5 mg daily but self-discontinued due to dizziness.    Etiology of transient right eye vision loss is unclear but differential includes CRAO vs BRAO vs GCA vs ischemic optic neuropathy vs amaurosis fugax. See consult noted dated 10/25 for further discussion. Exam today remains normal without signs of retinal ischemia or other retinal pathology. Mild cataracts on exam that may be visually significant. Patient following with Neurology at the end of this month for further workup and monitoring.    - follow up as needed or for yearly eye exams  - continue following with Neurology and PCP for HTN/HLD management      Patient disposition:   Return if symptoms worsen or fail to improve, for Annual Visit.    Patient seen with Dr. Carmen Hernandez MD  Resident Physician, PGY-2  Department of Ophthalmology  11/02/22 7:50 AM    Attending Physician Attestation:  Complete documentation of historical and exam elements from today's encounter can be found in the full encounter summary report (not reduplicated in this progress note).  I personally obtained the chief complaint(s) and history of present illness.  I confirmed and edited as necessary the review of systems, past medical/surgical history, family history, social history,  and examination findings as documented by others; and I examined the patient myself.  I personally reviewed the relevant tests, images, and reports as documented above.  I formulated and edited as necessary the assessment and plan and discussed the findings and management plan with the patient and family.  - Dakota Morales MD

## 2023-01-16 ENCOUNTER — E-VISIT (OUTPATIENT)
Dept: FAMILY MEDICINE | Facility: CLINIC | Age: 68
End: 2023-01-16
Payer: COMMERCIAL

## 2023-01-16 DIAGNOSIS — H00.12 CHALAZION OF RIGHT LOWER EYELID: Primary | ICD-10-CM

## 2023-01-16 PROCEDURE — 99421 OL DIG E/M SVC 5-10 MIN: CPT | Performed by: PHYSICIAN ASSISTANT

## 2023-01-16 NOTE — PATIENT INSTRUCTIONS
Thank you for choosing us for your care.   This looks like a stye.  Its a blockage of the gland near your eyelashes.  Simply use warm compresses several times a day and this will improve     If you re not feeling better within 7 days, please schedule an appointment.  You can schedule an appointment right here in [facility], or call [phone]  If the visit is for the same symptoms as your eVisit, we ll refund the cost of your eVisit if seen within seven days.

## 2023-01-18 ENCOUNTER — E-VISIT (OUTPATIENT)
Dept: FAMILY MEDICINE | Facility: CLINIC | Age: 68
End: 2023-01-18
Payer: COMMERCIAL

## 2023-01-18 DIAGNOSIS — H00.12 CHALAZION OF RIGHT LOWER EYELID: Primary | ICD-10-CM

## 2023-01-18 PROCEDURE — 99421 OL DIG E/M SVC 5-10 MIN: CPT | Performed by: FAMILY MEDICINE

## 2023-01-18 NOTE — PATIENT INSTRUCTIONS
Thank you for choosing us for your care. Based on your symptoms and length of illness, I do not think that you need a prescription at this time; however, I have placed a referral for you to see an eye doctor to have this drained if it worsens or does not resolve. They should call you within the next 2 business days; however, you could also call [phone] to schedule this.    I would continue with the warm packs at least twice daily.    Thank you,    Dr. Garcia    View your full visit summary for details by clicking on the link below.     If you're not feeling better within 2-3 days, please respond to this message and we can consider if a prescription is needed.  You can schedule an appointment right here in [facility], or call [phone]  If the visit is for the same symptoms as your eVisit, we'll refund the cost of your eVisit if seen within seven days.

## 2023-01-18 NOTE — TELEPHONE ENCOUNTER
Provider E-Visit time total (minutes): 7 minutes    Start time: 12:17 PM 1/18/2023     See iHandle message.    End Time: 12:24 PM  1/18/2023     Jaime Navarrete MD  [facility]    Disclaimer: This note consists of symbols derived from keyboarding, dictation and/or voice recognition software. As a result, there may be errors in the script that have gone undetected. Please consider this when interpreting information found in this chart.

## 2023-02-17 ENCOUNTER — E-VISIT (OUTPATIENT)
Dept: FAMILY MEDICINE | Facility: CLINIC | Age: 68
End: 2023-02-17
Payer: COMMERCIAL

## 2023-02-17 DIAGNOSIS — M25.552 BILATERAL HIP PAIN: Primary | ICD-10-CM

## 2023-02-17 DIAGNOSIS — M25.551 BILATERAL HIP PAIN: Primary | ICD-10-CM

## 2023-02-17 PROCEDURE — 99421 OL DIG E/M SVC 5-10 MIN: CPT | Performed by: FAMILY MEDICINE

## 2023-02-19 NOTE — TELEPHONE ENCOUNTER
Provider E-Visit time total (minutes): 3 min    Start time: 9:29 AM 2/19/2023     Requested PT referral placed. Appropriate plan of care for initial therapy.        End Time: 9:32 AM 2/19/2023     Jaime Navarrete MD  [facility]    Disclaimer: This note consists of symbols derived from keyboarding, dictation and/or voice recognition software. As a result, there may be errors in the script that have gone undetected. Please consider this when interpreting information found in this chart.

## 2023-02-19 NOTE — PATIENT INSTRUCTIONS
Thank you for choosing us for your care. I have placed an order for physical therapy so that you can start treatment. View your full visit summary for details by clicking on the link below.     Thank you,    Dr. Garcia

## 2023-02-21 ENCOUNTER — OFFICE VISIT (OUTPATIENT)
Dept: FAMILY MEDICINE | Facility: CLINIC | Age: 68
End: 2023-02-21
Payer: COMMERCIAL

## 2023-02-21 VITALS
WEIGHT: 208 LBS | DIASTOLIC BLOOD PRESSURE: 82 MMHG | SYSTOLIC BLOOD PRESSURE: 136 MMHG | HEART RATE: 84 BPM | HEIGHT: 67 IN | OXYGEN SATURATION: 96 % | TEMPERATURE: 98.1 F | BODY MASS INDEX: 32.65 KG/M2

## 2023-02-21 DIAGNOSIS — R07.89 XYPHOIDALGIA: Primary | ICD-10-CM

## 2023-02-21 PROCEDURE — 99213 OFFICE O/P EST LOW 20 MIN: CPT | Performed by: FAMILY MEDICINE

## 2023-02-21 NOTE — PATIENT INSTRUCTIONS
If the xyphoid is getting more irritated please follow up   Also do not keep pushing on it. You can check it just one time per week

## 2023-02-21 NOTE — PROGRESS NOTES
"  Assessment & Plan     Xyphoidalgia  Explained to the patient that this is normal he may have bumped something in her made it just a little more tender reassurance was given if it is worsening he should return to see his own PCP         BMI:   Estimated body mass index is 32.35 kg/m  as calculated from the following:    Height as of this encounter: 1.708 m (5' 7.23\").    Weight as of this encounter: 94.3 kg (208 lb).           Return in about 1 day (around 2/22/2023) for with consultant as planned.    Valeri Werner MD  [facility] JOSE GUADALUPE Spangler is a 67 year old, presenting for the following health issues:  Mass      History of Present Illness       Reason for visit:  Lump found under stetnum. Internal  Symptom onset:  More than a month  Symptoms include:  No pain  can feel it bending forward. Internal lump  Symptom intensity:  Mild  Symptom progression:  Worsening  Had these symptoms before:  No  What makes it worse:  No  What makes it better:  No    He eats 2-3 servings of fruits and vegetables daily.He consumes 1 sweetened beverage(s) daily.He exercises with enough effort to increase his heart rate 9 or less minutes per day.  He exercises with enough effort to increase his heart rate 3 or less days per week.   He is taking medications regularly.     About 6 months ago does not remember what made him feel this no pain   Not sure what it is he is just concerned that something is wrong he has not lost any weight he otherwise feels fine there is no fever other changes        Review of Systems   Constitutional, HEENT, cardiovascular, pulmonary, gi and gu systems are negative, except as otherwise noted.      Objective    /82 (BP Location: Right arm, Patient Position: Sitting, Cuff Size: Adult Regular)   Pulse 84   Temp 98.1  F (36.7  C) (Tympanic)   Ht 1.708 m (5' 7.23\")   Wt 94.3 kg (208 lb)   SpO2 96%   BMI 32.35 kg/m    Body mass index is 32.35 kg/m .  Physical Exam "   GENERAL: healthy, alert and no distress  NECK: no adenopathy, no asymmetry, masses, or scars and thyroid normal to palpation  RESP: lungs clear to auscultation - no rales, rhonchi or wheezes  CV: regular rate and rhythm, normal S1 S2, no S3 or S4, no murmur, click or rub, no peripheral edema and peripheral pulses strong  Xyphoid palpated as the lump        Valeri Werner M.D.

## 2023-02-23 ENCOUNTER — THERAPY VISIT (OUTPATIENT)
Dept: PHYSICAL THERAPY | Facility: CLINIC | Age: 68
End: 2023-02-23
Attending: FAMILY MEDICINE
Payer: COMMERCIAL

## 2023-02-23 DIAGNOSIS — M25.552 BILATERAL HIP PAIN: ICD-10-CM

## 2023-02-23 DIAGNOSIS — M25.551 BILATERAL HIP PAIN: ICD-10-CM

## 2023-02-23 PROCEDURE — 97161 PT EVAL LOW COMPLEX 20 MIN: CPT | Mod: GP | Performed by: PHYSICAL THERAPIST

## 2023-02-23 PROCEDURE — 97110 THERAPEUTIC EXERCISES: CPT | Mod: GP | Performed by: PHYSICAL THERAPIST

## 2023-02-23 ASSESSMENT — ACTIVITIES OF DAILY LIVING (ADL)
HOW_WOULD_YOU_RATE_YOUR_CURRENT_LEVEL_OF_FUNCTION_DURING_YOUR_USUAL_ACTIVITIES_OF_DAILY_LIVING_FROM_0_TO_100_WITH_100_BEING_YOUR_LEVEL_OF_FUNCTION_PRIOR_TO_YOUR_HIP_PROBLEM_AND_0_BEING_THE_INABILITY_TO_PERFORM_ANY_OF_YOUR_USUAL_DAILY_ACTIVITIES?: 60
HOS_ADL_SCORE(%): 64.71
RECREATIONAL_ACTIVITIES: EXTREME DIFFICULTY
GETTING_INTO_AND_OUT_OF_AN_AVERAGE_CAR: SLIGHT DIFFICULTY
HOS_ADL_COUNT: 17
GETTING_INTO_AND_OUT_OF_A_BATHTUB: MODERATE DIFFICULTY
DEEP_SQUATTING: EXTREME DIFFICULTY
WALKING_DOWN_STEEP_HILLS: MODERATE DIFFICULTY
TWISTING/PIVOTING_ON_INVOLVED_LEG: SLIGHT DIFFICULTY
STEPPING_UP_AND_DOWN_CURBS: NO DIFFICULTY AT ALL
WALKING_15_MINUTES_OR_GREATER: SLIGHT DIFFICULTY
HOS_ADL_ITEM_SCORE_TOTAL: 44
LIGHT_TO_MODERATE_WORK: SLIGHT DIFFICULTY
HEAVY_WORK: MODERATE DIFFICULTY
ROLLING_OVER_IN_BED: NO DIFFICULTY AT ALL
WALKING_APPROXIMATELY_10_MINUTES: NO DIFFICULTY AT ALL
SITTING_FOR_15_MINUTES: SLIGHT DIFFICULTY
GOING_DOWN_1_FLIGHT_OF_STAIRS: MODERATE DIFFICULTY
GOING_UP_1_FLIGHT_OF_STAIRS: MODERATE DIFFICULTY
WALKING_UP_STEEP_HILLS: MODERATE DIFFICULTY
STANDING_FOR_15_MINUTES: NO DIFFICULTY AT ALL
WALKING_INITIALLY: MODERATE DIFFICULTY
PUTTING_ON_SOCKS_AND_SHOES: EXTREME DIFFICULTY
HOS_ADL_HIGHEST_POTENTIAL_SCORE: 68

## 2023-02-23 NOTE — PROGRESS NOTES
Physical Therapy Initial Evaluation  Subjective:  The history is provided by the patient. No  was used.   Patient Health History  Aníbal Goldstein being seen for Bilatetal hip pain, Rt hip much worse.     Problem began: 2/20/2023.   Problem occurred: Gradual onset, progressing since 2020   Pain is reported as 0/10 on pain scale.  General health as reported by patient is excellent.     Red flags:  None as reported by patient.  Medical allergies: none.   Surgeries include:  Orthopedic surgery. Other surgery history details: B Shoulders and wrists.    Current medications:  None.    Current occupation is ; Lives at home w/ wife and 2 cats, and 2 hourses.   Primary job tasks include:  Driving and other.                  Therapist Generated HPI Evaluation         Type of problem:  Left hip.    This is a chronic condition.  Condition occurred with:  Repetition/overuse.  Where condition occurred: for unknown reasons.  Patient reports pain:  Groin, posterior and lateral.  Pain is described as aching and is intermittent.  Pain radiates to:  No radiation. Pain is the same all the time.  Since onset symptoms are gradually worsening.  Associated symptoms:  Loss of motion/stiffness and catching (denies numbness, tingling, or giving out). Symptoms are exacerbated by ascending stairs, descending stairs, walking and transfers (putting on socks, bending, avoiding squatting)  Relieved by: none yet.  Imaging testing: none yet.  Past treatment: none yet.   Restrictions due to condition include:  Working in normal job without restrictions.  Barriers include:  Stairs.                        Objective:  Standing Alignment:        Lumbar:  Anterior pelvic tilt and lordosis incr            Gait:      Deviations:  Lumbar:  Trunk flexionGeneral Deviations:  Stride length decr               Lumbar/SI Evaluation  ROM:    AROM Lumbar:   Flexion:            To toes  Ext:                    10%   Side Bend:         Left:  To knee    Right:  To knee  Rotation:           Left:  Min limitation (compensates through B LE)    Right:  Min limitation (compensates through B LE)  Side Glide:        Left:     Right:                                                               Hip Evaluation  HIP AROM:    Flexion: Left: 98    Right:  112    Extension: Left: lacking 5    Right:  0      Internal Rotation: Left: 0    Right: 15  External Rotation: Left: 30    Right: 45        Hip Strength:  : Glute Max 5/5.    Flexion:   Left: 4+/5   Pain:  Right: 4+/5   Pain:                    Extension:  Left: 5/5  Pain:Right: 5/5    Pain:    Abduction:  Left: 5-/5     Pain:Right: 5-/5    Pain:                  Hip Special Testing:    Left hip positive for the following special tests:  Kade and Fadir/Labrum (?) Left, significant decrease in ROM  Right hip negative for the following special tests:  Kade or Fadir/Labrum                 General     ROS    Assessment/Plan:    Patient is a 67 year old male with left side hip complaints.    Patient has the following significant findings with corresponding treatment plan.                Diagnosis 1:  L >R Hip pain    Pain -  hot/cold therapy, electric stimulation, manual therapy, self management, education, directional preference exercise and home program  Decreased ROM/flexibility - manual therapy, therapeutic exercise, therapeutic activity and home program  Decreased joint mobility - manual therapy, therapeutic exercise, therapeutic activity and home program  Decreased strength - therapeutic exercise, therapeutic activities and home program  Impaired gait - gait training and home program  Impaired muscle performance - neuro re-education and home program  Decreased function - therapeutic activities and home program  Impaired posture - neuro re-education, therapeutic activities and home program    Therapy Evaluation Codes:   1) History comprised of:   Personal factors that impact the plan of care:      Age,  Past/current experiences, Profession and Time since onset of symptoms.    Comorbidity factors that impact the plan of care are:      None.     Medications impacting care: None.  2) Examination of Body Systems comprised of:   Body structures and functions that impact the plan of care:      Hip and Lumbar spine.   Activity limitations that impact the plan of care are:      Bending, Dressing, Squatting/kneeling, Walking and Working.  3) Clinical presentation characteristics are:   Stable/Uncomplicated.  4) Decision-Making    Low complexity using standardized patient assessment instrument and/or measureable assessment of functional outcome.  Cumulative Therapy Evaluation is: Low complexity.    Previous and current functional limitations:  (See Goal Flow Sheet for this information)    Short term and Long term goals: (See Goal Flow Sheet for this information)     Communication ability:  Patient appears to be able to clearly communicate and understand verbal and written communication and follow directions correctly.  Treatment Explanation - The following has been discussed with the patient:   RX ordered/plan of care  Anticipated outcomes  Possible risks and side effects  This patient would benefit from PT intervention to resume normal activities.   Rehab potential is good.    Frequency:  1 X week, once daily  Duration:  for 6 weeks tapering to 2 X a month over 1 month  Discharge Plan:  Achieve all LTG.  Independent in home treatment program.  Reach maximal therapeutic benefit.    Please refer to the daily flowsheet for treatment today, total treatment time and time spent performing 1:1 timed codes.

## 2023-02-23 NOTE — PROGRESS NOTES
[facility]    OUTPATIENT Physical Therapy ORTHOPEDIC EVALUATION  PLAN OF TREATMENT FOR OUTPATIENT REHABILITATION  (COMPLETE FOR INITIAL CLAIMS ONLY)  Patient's Last Name, First Name, M.I.  YOB: 1955  TristonAníbal    Provider s Name:  KESHAV [facility]   Medical Record No.  [id number]   Start of Care Date:  02/23/23   Onset Date:   02/19/23 (date of referral)   Treatment Diagnosis:  L Hip pain Medical Diagnosis:  Bilateral hip pain       Goals:     02/23/23 0500   Body Part   Goals listed below are for L Hip   Goal #1   Goal #1 self cares/transfers/bed mobility   Previous Functional Level No restrictions   Current Functional Level Able ;to do the following self cares   Performance Level put on socks, shoes w/ compensation and 3-4/10 pain   STG Target Performance Be able to ; do the following self cares    Performance Level put on socks and shoes w/ 2/10 pain   Rationale for independent self care such as dressing, personal hygiene, bathing   Due Date 03/23/23   LTG Target Performance Be able to ; do the following self cares    Performance level put on socks and shoes w/o compensation w/o pain   Rationale independence in self cares   Due Date 05/04/23         Therapy Frequency:  1x/week  Predicted Duration of Therapy Intervention:  x6 weeks, tapering to 2x/month x 1 month    Amanda Hilligoss, PT                 I CERTIFY THE NEED FOR THESE SERVICES FURNISHED UNDER        THIS PLAN OF TREATMENT AND WHILE UNDER MY CARE     (Physician attestation of this document indicates review and certification of the therapy plan).                     Certification Date From:  02/23/23   Certification Date To:  05/23/23    Referring Provider:  Jaime Navarrete    Initial Assessment        See Epic Evaluation SOC Date: 02/23/23

## 2023-03-08 ENCOUNTER — THERAPY VISIT (OUTPATIENT)
Dept: PHYSICAL THERAPY | Facility: CLINIC | Age: 68
End: 2023-03-08
Attending: FAMILY MEDICINE
Payer: COMMERCIAL

## 2023-03-08 DIAGNOSIS — M25.551 BILATERAL HIP PAIN: Primary | ICD-10-CM

## 2023-03-08 DIAGNOSIS — M25.552 BILATERAL HIP PAIN: Primary | ICD-10-CM

## 2023-03-08 PROCEDURE — 97110 THERAPEUTIC EXERCISES: CPT | Mod: GP | Performed by: PHYSICAL THERAPY ASSISTANT

## 2023-03-15 ENCOUNTER — THERAPY VISIT (OUTPATIENT)
Dept: PHYSICAL THERAPY | Facility: CLINIC | Age: 68
End: 2023-03-15
Attending: FAMILY MEDICINE
Payer: COMMERCIAL

## 2023-03-15 DIAGNOSIS — M25.551 BILATERAL HIP PAIN: Primary | ICD-10-CM

## 2023-03-15 DIAGNOSIS — M25.552 BILATERAL HIP PAIN: Primary | ICD-10-CM

## 2023-03-15 PROCEDURE — 97110 THERAPEUTIC EXERCISES: CPT | Mod: GP | Performed by: PHYSICAL THERAPIST

## 2023-03-15 PROCEDURE — 97530 THERAPEUTIC ACTIVITIES: CPT | Mod: GP | Performed by: PHYSICAL THERAPIST

## 2023-03-15 PROCEDURE — 97140 MANUAL THERAPY 1/> REGIONS: CPT | Mod: GP | Performed by: PHYSICAL THERAPIST

## 2023-03-29 ENCOUNTER — THERAPY VISIT (OUTPATIENT)
Dept: PHYSICAL THERAPY | Facility: CLINIC | Age: 68
End: 2023-03-29
Payer: COMMERCIAL

## 2023-03-29 DIAGNOSIS — M25.552 BILATERAL HIP PAIN: Primary | ICD-10-CM

## 2023-03-29 DIAGNOSIS — M25.551 BILATERAL HIP PAIN: Primary | ICD-10-CM

## 2023-03-29 PROCEDURE — 97110 THERAPEUTIC EXERCISES: CPT | Mod: GP | Performed by: PHYSICAL THERAPY ASSISTANT

## 2023-03-29 PROCEDURE — 97140 MANUAL THERAPY 1/> REGIONS: CPT | Mod: GP | Performed by: PHYSICAL THERAPY ASSISTANT

## 2023-04-12 ENCOUNTER — THERAPY VISIT (OUTPATIENT)
Dept: PHYSICAL THERAPY | Facility: CLINIC | Age: 68
End: 2023-04-12
Payer: COMMERCIAL

## 2023-04-12 DIAGNOSIS — M25.551 BILATERAL HIP PAIN: Primary | ICD-10-CM

## 2023-04-12 DIAGNOSIS — M25.552 BILATERAL HIP PAIN: Primary | ICD-10-CM

## 2023-04-12 PROCEDURE — 97110 THERAPEUTIC EXERCISES: CPT | Mod: GP | Performed by: PHYSICAL THERAPY ASSISTANT

## 2023-04-18 NOTE — TELEPHONE ENCOUNTER
DIAGNOSIS: (B) hip pain   APPOINTMENT DATE: 05/04/2023   NOTES STATUS DETAILS   OFFICE NOTE from referring provider Internal 04/12/2023 PT [facility]    OFFICE NOTE from other specialist Internal 02/17/2023 Dr [facility] e-visit   DISCHARGE SUMMARY from hospital N/A    DISCHARGE REPORT from the ER N/A    OPERATIVE REPORT N/A    EMG report N/A    MEDICATION LIST N/A    MRI N/A    DEXA (osteoporosis/bone health) N/A    CT SCAN N/A    XRAYS (IMAGES & REPORTS) N/A

## 2023-05-04 ENCOUNTER — PRE VISIT (OUTPATIENT)
Dept: ORTHOPEDICS | Facility: CLINIC | Age: 68
End: 2023-05-04

## 2023-05-04 ENCOUNTER — OFFICE VISIT (OUTPATIENT)
Dept: ORTHOPEDICS | Facility: CLINIC | Age: 68
End: 2023-05-04
Payer: COMMERCIAL

## 2023-05-04 ENCOUNTER — ANCILLARY PROCEDURE (OUTPATIENT)
Dept: GENERAL RADIOLOGY | Facility: CLINIC | Age: 68
End: 2023-05-04
Attending: FAMILY MEDICINE
Payer: COMMERCIAL

## 2023-05-04 DIAGNOSIS — M25.551 BILATERAL HIP PAIN: ICD-10-CM

## 2023-05-04 DIAGNOSIS — M25.552 BILATERAL HIP PAIN: Primary | ICD-10-CM

## 2023-05-04 DIAGNOSIS — M25.551 BILATERAL HIP PAIN: Primary | ICD-10-CM

## 2023-05-04 DIAGNOSIS — M25.552 BILATERAL HIP PAIN: ICD-10-CM

## 2023-05-04 PROCEDURE — 73522 X-RAY EXAM HIPS BI 3-4 VIEWS: CPT | Performed by: RADIOLOGY

## 2023-05-04 PROCEDURE — 99204 OFFICE O/P NEW MOD 45 MIN: CPT | Performed by: FAMILY MEDICINE

## 2023-05-04 RX ORDER — MELOXICAM 15 MG/1
TABLET ORAL
Qty: 30 TABLET | Refills: 1 | Status: SHIPPED | OUTPATIENT
Start: 2023-05-04 | End: 2023-10-18

## 2023-05-04 ASSESSMENT — PAIN SCALES - GENERAL: PAINLEVEL: NO PAIN (0)

## 2023-05-04 NOTE — NURSING NOTE
Chief Complaint   Patient presents with     Right Hip - Pain, New Patient     Left Hip - Pain, New Patient       There were no vitals filed for this visit.    There is no height or weight on file to calculate BMI.      JARETH Burton NREMT

## 2023-05-04 NOTE — LETTER
5/4/2023         RE: Aníbal Goldstein  [address] [address]        Dear Colleague,    Thank you for referring your patient, Aníbal Goldstein, to the [facility] [place]. Please see a copy of my visit note below.    CHIEF COMPLAINT:  Pain and New Patient of the Right Hip and Pain and New Patient of the Left Hip       HISTORY OF PRESENT ILLNESS  Mr. Goldstein is a pleasant 67 year old male who presents to clinic today with bilateral hip pain.  Sachin has been experiencing mainly range of motion issues in both of his hips.  Specifically left.  He does have some pain at times, he points to the groin and posterior aspect of his hip.  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        Additional history: as documented    MEDICAL HISTORY  Patient Active Problem List   Diagnosis     Cervicalgia     Infection due to 2019 novel coronavirus     Transient visual loss, right     Mixed hyperlipidemia     Benign essential hypertension     Prediabetes     Bilateral hip pain       Current Outpatient Medications   Medication Sig Dispense Refill     aspirin (ASA) 325 MG EC tablet Take 1 tablet (325 mg) by mouth daily 90 tablet 3     atorvastatin (LIPITOR) 40 MG tablet Take 1 tablet (40 mg) by mouth every evening 90 tablet 3     lisinopril (ZESTRIL) 10 MG tablet Take 0.5 tablets (5 mg) by mouth daily 90 tablet 3     meloxicam (MOBIC) 15 MG tablet Take once daily for 1 week, then daily as needed after this timeframe 30 tablet 1       Allergies   Allergen Reactions     No Known Allergies        Family History   Problem Relation Age of Onset     Diabetes Father         Adult onset     Alzheimer Disease Father      Alzheimer Disease Mother      Colon Cancer No family hx of      Prostate Cancer No family hx of       Breast Cancer No family hx of      Ulcerative Colitis No family hx of      Crohn's Disease No family hx of        Additional medical/Social/Surgical histories reviewed in [facility] and updated as appropriate.        PHYSICAL EXAM    General  - normal appearance, in no obvious distress  Musculoskeletal - right and left hip  - stance: gait slightly favors affected side  - inspection: no swelling or effusion,  normal bone and joint alignment, no obvious deformity  - palpation: no lateral or anterior hip tenderness  - ROM: limited flexion and internal rotation, left worse than right  - strength: 5/5 in all planes  - special tests:  (-) OMAR  (-) FADIR  Neuro  - no sensory or motor deficit, grossly normal coordination, normal muscle tone             ASSESSMENT & PLAN  Mr. Goldstein is a 67 year old male who presents to clinic today with bilateral hip pain.    I ordered and independently reviewed x-rays of his hips, these do reveal severe osteoarthritis in his left hip and moderate osteoarthritis in his right.    We had a good discussion centering around these images and their implications, we also discussed pathophysiology and treatment strategies for osteoarthritis from a nonoperative perspective.  I do think he will do great with physical therapy, I am referring him to get started at his earliest convenience.  I am also prescribing him meloxicam to take as helpful.    In the future we could consider a corticosteroid injection if his range of motion does not improve whatsoever, and his pain is ongoing.    It was a pleasure seeing nAíbal today.    Joe Kraus DO, [facility]  Primary Care Sports Medicine      This note was constructed using Dragon dictation software, please excuse any minor errors in spelling, grammar, or syntax.      Again, thank you for allowing me to participate in the care of your patient.        Sincerely,        Joe Kraus DO

## 2023-05-05 NOTE — PROGRESS NOTES
CHIEF COMPLAINT:  Pain and New Patient of the Right Hip and Pain and New Patient of the Left Hip       HISTORY OF PRESENT ILLNESS  Mr. Goldstein is a pleasant 67 year old male who presents to clinic today with bilateral hip pain.  Sachin has been experiencing mainly range of motion issues in both of his hips.  Specifically left.  He does have some pain at times, he points to the groin and posterior aspect of his hip.  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        Additional history: as documented    MEDICAL HISTORY  Patient Active Problem List   Diagnosis     Cervicalgia     Infection due to 2019 novel coronavirus     Transient visual loss, right     Mixed hyperlipidemia     Benign essential hypertension     Prediabetes     Bilateral hip pain       Current Outpatient Medications   Medication Sig Dispense Refill     aspirin (ASA) 325 MG EC tablet Take 1 tablet (325 mg) by mouth daily 90 tablet 3     atorvastatin (LIPITOR) 40 MG tablet Take 1 tablet (40 mg) by mouth every evening 90 tablet 3     lisinopril (ZESTRIL) 10 MG tablet Take 0.5 tablets (5 mg) by mouth daily 90 tablet 3     meloxicam (MOBIC) 15 MG tablet Take once daily for 1 week, then daily as needed after this timeframe 30 tablet 1       Allergies   Allergen Reactions     No Known Allergies        Family History   Problem Relation Age of Onset     Diabetes Father         Adult onset     Alzheimer Disease Father      Alzheimer Disease Mother      Colon Cancer No family hx of      Prostate Cancer No family hx of      Breast Cancer No family hx of      Ulcerative Colitis No family hx of      Crohn's Disease No family hx of        Additional medical/Social/Surgical histories reviewed in [facility] and updated as appropriate.        PHYSICAL EXAM    General  - normal appearance, in no obvious  distress  Musculoskeletal - right and left hip  - stance: gait slightly favors affected side  - inspection: no swelling or effusion,  normal bone and joint alignment, no obvious deformity  - palpation: no lateral or anterior hip tenderness  - ROM: limited flexion and internal rotation, left worse than right  - strength: 5/5 in all planes  - special tests:  (-) OMAR  (-) FADIR  Neuro  - no sensory or motor deficit, grossly normal coordination, normal muscle tone             ASSESSMENT & PLAN  Mr. Goldstein is a 67 year old male who presents to clinic today with bilateral hip pain.    I ordered and independently reviewed x-rays of his hips, these do reveal severe osteoarthritis in his left hip and moderate osteoarthritis in his right.    We had a good discussion centering around these images and their implications, we also discussed pathophysiology and treatment strategies for osteoarthritis from a nonoperative perspective.  I do think he will do great with physical therapy, I am referring him to get started at his earliest convenience.  I am also prescribing him meloxicam to take as helpful.    In the future we could consider a corticosteroid injection if his range of motion does not improve whatsoever, and his pain is ongoing.    It was a pleasure seeing Aníbal today.    Joe Kraus DO, [facility]  Primary Care Sports Medicine      This note was constructed using Dragon dictation software, please excuse any minor errors in spelling, grammar, or syntax.

## 2023-06-08 PROBLEM — M25.552 BILATERAL HIP PAIN: Status: RESOLVED | Noted: 2023-02-23 | Resolved: 2023-06-08

## 2023-06-08 PROBLEM — M25.551 BILATERAL HIP PAIN: Status: RESOLVED | Noted: 2023-02-23 | Resolved: 2023-06-08

## 2023-06-08 NOTE — PROGRESS NOTES
Discharge Note    Progress reporting period is from initial evaluation date (please see noted date below) to Apr 12, 2023.  Linked Episodes   Type: Episode: Status: Noted: Resolved: Last update: Updated by:   PHYSICAL THERAPY L Hip 2-23-23 Active 2/23/2023 4/12/2023  4:12 PM Hilligoss, Amanda K, PT      Comments:       Aníbal failed to follow up and current status is unknown.  Please see information below for last relevant information on current status.  Patient seen for 5 visits.    SUBJECTIVE  Subjective changes noted by patient:  After last session, that night had L LE feeling weak/giving way, lasting about 3 days.  .  Current pain level is  (varies, described as tightness pull, giving way vs pain).     Previous pain level was  0/10 .   Changes in function:  Yes (See Goal flowsheet attached for changes in current functional level)  Adverse reaction to treatment or activity: None    OBJECTIVE  Changes noted in objective findings: L hip AROM: flex 100, IR 5, ER 35. MMT L hip flex 4+/5, abd 5/5, ext 5/5. (+)L rossana/fadir     ASSESSMENT/PLAN  Diagnosis: L Hip pain   Updated problem list and treatment plan:   Decreased function - HEP  Decreased strength - HEP  STG/LTGs have been met or progress has been made towards goals:  Yes, please see goal flowsheet for most current information  Assessment of Progress: current status is unknown.    Last current status: Pt is progressing slower than anticipated   Self Management Plans:  HEP  I have re-evaluated this patient and find that the nature, scope, duration and intensity of the therapy is appropriate for the medical condition of the patient.  Aníbal continues to require the following intervention to meet STG and LTG's:  HEP.    Recommendations:  Discharge with current home program.  Patient to follow up with MD as needed.    Please refer to the daily flowsheet for treatment today, total treatment time and time spent performing 1:1 timed codes.    Dariel Hutson,PT, DPT,  OCS

## 2023-06-10 ENCOUNTER — HEALTH MAINTENANCE LETTER (OUTPATIENT)
Age: 68
End: 2023-06-10

## 2023-06-22 ENCOUNTER — TELEPHONE (OUTPATIENT)
Dept: ORTHOPEDICS | Facility: CLINIC | Age: 68
End: 2023-06-22

## 2023-06-22 NOTE — TELEPHONE ENCOUNTER
[facility] Call Center    Phone Message    May a detailed message be left on voicemail: yes     Reason for Call: Other: Pt is asking if anyone of Dr Victor colleagues are able to do follow ups specifically cortizone injections while Dr Kraus is out . Pt is scheduled in Aug for Dr Kraus but would like to get rob injection sooner     Action Taken: Other: ortho     Travel Screening: Not Applicable

## 2023-06-30 NOTE — TELEPHONE ENCOUNTER
DIAGNOSIS: (B) Hip pain and arthritis    APPOINTMENT DATE: 07/25/2023   NOTES STATUS DETAILS   OFFICE NOTE from referring provider Internal 05/04/2023 Dr Kraus [facility]   OFFICE NOTE from other specialist Internal 04/12/2023 PT [facility]   02/17/2023 Dr [facility] e-visit   DISCHARGE SUMMARY from hospital N/A    DISCHARGE REPORT from the ER N/A    OPERATIVE REPORT N/A    MEDICATION LIST N/A    EMG (for Spine) N/A    IMPLANT RECORD/STICKER N/A    LABS     CBC/DIFF N/A    CULTURES N/A    INJECTIONS DONE IN RADIOLOGY N/A    MRI N/A    CT SCAN N/A    XRAYS (IMAGES & REPORTS) Internal 05/04/2023 pelvis hip   TUMOR     PATHOLOGY  Slides & report N/A

## 2023-07-25 ENCOUNTER — PRE VISIT (OUTPATIENT)
Dept: ORTHOPEDICS | Facility: CLINIC | Age: 68
End: 2023-07-25

## 2023-07-25 ENCOUNTER — TELEPHONE (OUTPATIENT)
Dept: ORTHOPEDICS | Facility: CLINIC | Age: 68
End: 2023-07-25

## 2023-07-25 ENCOUNTER — OFFICE VISIT (OUTPATIENT)
Dept: ORTHOPEDICS | Facility: CLINIC | Age: 68
End: 2023-07-25
Payer: COMMERCIAL

## 2023-07-25 VITALS — WEIGHT: 206.9 LBS | BODY MASS INDEX: 33.25 KG/M2 | HEIGHT: 66 IN

## 2023-07-25 DIAGNOSIS — M16.12 PRIMARY LOCALIZED OSTEOARTHRITIS OF LEFT HIP: Primary | ICD-10-CM

## 2023-07-25 DIAGNOSIS — M25.551 BILATERAL HIP PAIN: ICD-10-CM

## 2023-07-25 DIAGNOSIS — M25.552 BILATERAL HIP PAIN: ICD-10-CM

## 2023-07-25 PROCEDURE — 99204 OFFICE O/P NEW MOD 45 MIN: CPT | Performed by: ORTHOPAEDIC SURGERY

## 2023-07-25 RX ORDER — ACETAMINOPHEN 500 MG
500-1000 TABLET ORAL EVERY 6 HOURS PRN
COMMUNITY
End: 2024-01-17

## 2023-07-25 RX ORDER — IBUPROFEN 200 MG
400 TABLET ORAL EVERY 4 HOURS PRN
COMMUNITY
End: 2024-01-17

## 2023-07-25 ASSESSMENT — PAIN SCALES - GENERAL
PAINLEVEL: MILD PAIN (2)
PAINLEVEL: MILD PAIN (2)

## 2023-07-25 NOTE — PROGRESS NOTES
Assessment: This is a 67-year-old male with advanced left hip osteoarthritis associated with symptoms that are severe and limit his activities of daily living.  Today we discussed the diagnosis and the treatment options including living with it injection and total hip arthroplasty. We spent twenty minutes discussing total hip arthroplasty.  We discussed the implants, the procedure, the risks and benefits, and the post-operative course.  We discussed blood clots, blood clots to the lungs, injury to blood vessels and nerves, dislocation, infection, and leg length difference.  We discussed that as part of the routine part of surgical care a qualified assist may finish closing the wound after I have left the room.  We discussed the technical aspects of the surgery by reviewing templated radiographs.  All the patients questions were answered to the best of my ability.    Plan: He would like to continue working during his busy part of the year for him at work.  To facilitate this he like to have a steroid injection.  This has been ordered.  He would like to proceed with total hip arthroplasty in December.  I think this is reasonable considering his symptoms and radiographic changes.  Today surgery should be scheduled shoulder 3 months or more from his injection.  Orders placed for left total hip      Chief Complaint: Pain of the Right Hip and Pain of the Left Hip (L hip pain > R)      Physician:  Joe Kraus    HPI: Aníbal Goldstein is a 67 year old male who presents today for evaluation of his left hip     Location of symptoms:  groin   Onset: insidious  Duration of symptoms:about a year   Quality of symptoms: aching and sharp and loss of ROM   Severity: moderate   Alleviating: activity modification  Exacerbating: activities  Previous Treatments: Previous treatments include activity modification, oral pain medication, physical therapy     Interested in trying a steroid injection if it does not interfere with the  option of total hip in the winter (construction)    ACOSTA Jr: 46.65  PROMIS Mental: (P) 19  PROMIS Physical: (P) 12  PROMIS Total: (P) 31  [place] Activity Scale:    MEDICAL HISTORY:   Past Medical History:   Diagnosis Date    Cervicalgia 01/2008    Hypertension 10-6-22    Pain in joint, shoulder region 01/2008       Pertinent negatives:  Patient has no history of DVT or PE. Discussed risk factors.    Medications:     Current Outpatient Medications:     acetaminophen (TYLENOL) 500 MG tablet, Take 500-1,000 mg by mouth every 6 hours as needed for mild pain, Disp: , Rfl:     atorvastatin (LIPITOR) 40 MG tablet, Take 1 tablet (40 mg) by mouth every evening, Disp: 90 tablet, Rfl: 3    ibuprofen (ADVIL/MOTRIN) 200 MG tablet, Take 400 mg by mouth every 4 hours as needed for pain, Disp: , Rfl:     lisinopril (ZESTRIL) 10 MG tablet, Take 0.5 tablets (5 mg) by mouth daily, Disp: 90 tablet, Rfl: 3    aspirin (ASA) 325 MG EC tablet, Take 1 tablet (325 mg) by mouth daily (Patient not taking: Reported on 7/25/2023), Disp: 90 tablet, Rfl: 3    meloxicam (MOBIC) 15 MG tablet, Take once daily for 1 week, then daily as needed after this timeframe (Patient not taking: Reported on 7/25/2023), Disp: 30 tablet, Rfl: 1    Allergies: No known allergies    SURGICAL HISTORY:   Past Surgical History:   Procedure Laterality Date    COLONOSCOPY      COLONOSCOPY WITH CO2 INSUFFLATION N/A 5/17/2021    Procedure: COLONOSCOPY, WITH CO2 INSUFFLATION;  Surgeon: Elder Bates DO;  Location: [facility] OR    HC KNEE SCOPE,MED/LAT MENISECTOMY  2/9/2009    Right knee    HERNIA REPAIR, INGUINAL RT/LT      lt ?    RELEASE CARPAL TUNNEL Right 3/1/2018    Procedure: RELEASE CARPAL TUNNEL;  Right carpal tunnel release;  Surgeon: Britney Delgado MD;  Location: [facility] OR    ROTATOR CUFF REPAIR RT/LT  02/18/10    Right side. Done at [facility].       HISTORY:   Family History   Problem Relation Age of Onset    Diabetes Father         Adult onset     "Alzheimer Disease Father     Alzheimer Disease Mother     Colon Cancer No family hx of     Prostate Cancer No family hx of     Breast Cancer No family hx of     Ulcerative Colitis No family hx of     Crohn's Disease No family hx of        SOCIAL HISTORY:   Social History     Tobacco Use    Smoking status: Never    Smokeless tobacco: Never   Substance Use Topics    Alcohol use: Not Currently     Alcohol/week: 1.7 standard drinks of alcohol     Comment: 0-1 per week    Working, drives truck  , lives with wife     REVIEW OF SYSTEMS:  The comprehensive review of systems from the intake form was reviewed with the patient.  No fever, weight change or fatigue. No dry eyes. No oral ulcers, sore throat or voice change. No palpitations, syncope, angina or edema.  No chest pain, excessive sleepiness, shortness of breath or hemoptysis.   No abdominal pain, nausea, vomiting, diarrhea or heartburn.  No skin rash. No focal weakness or numbness. No bleeding or lymphadenopathy. No rhinitis or hives.     Exam:  On physical examination the patient appears the stated age, is in no acute distress, alert and oriented, affect is appropriate, and breathing is non-labored.  Vitals are documented in the EMR and have been reviewed:    Ht 1.683 m (5' 6.25\")   Wt 93.8 kg (206 lb 14.4 oz)   BMI 33.14 kg/m    5' 6.25\"  Body mass index is 33.14 kg/m .    Rises from chair: Easily  Gait: Antalgic with less time on the left  Trendelenburg test:  Gains the exam table: Easily    RIGHT hip subjective: Fluid and painless  Abd: 30  Add: 10  Flexion: 100  IRF: 0  ERF: 30  Impingement test: Negative  Tenderness to palpation: No    LEFT hip subjective: A little irritated  Abd: 15  Add: 5  Flexion: 90 and abduction and 65 in neutral  IRF: -20  ERF: 25  Impingement test: Positive groin reproduces symptoms  Tenderness to palpation no    Distal the circulatory, motor, and sensation exam is intact with 5/5 EHL, gastroc-soleus, and tibialis anterior.  " Sensation to light touch is intact.  Dorsalis pedis and posterior tibialis pulses are palpable.  There are no sores on the feet, no bruising, and no lymphedema.    Imaging:   Tonnis 3 left hip

## 2023-07-25 NOTE — TELEPHONE ENCOUNTER
Procedure: CADE  Facility: [facility]  Length: 120 minutes  Anesthesia: Choice  Post-op appointments needed: 2 weeks nurse only, 6 weeks with provider only.  Surgery packet/instructions given to patient?  Yes     Pre-Operative Teaching Flowsheet     Person(s) involved in teaching: Patient     Motivation Level:  Receptive (willing/able to accept information) and asks appropriate questions where applicable: Yes  Any cultural factors/Faith beliefs that may influence understanding or compliance? No     Patient demonstrates understanding of the following:  Pre-operative planning, including the necessary appointments and preparation needed prior to surgery: Yes  Which situations necessitate calling provider and whom to contact: Yes  Pain management techniques pre and post op: Yes  How, and when, to access community resources: Yes    Who will drive and stay/ with patient after surgery: Wife  Pre-op exam FV.  PT to be completed at [facility]         Additional Teaching Concerns Addressed:   Post-operative living arrangements and necessary adaptations to living environment.     Instructional Materials Used/Given: Yes, pre-op packet given including forms for Your surgery day, medications to stop taking before surgery, preparing for surgery, Covid-19 testing, showering before surgery, Stop light tool introduced, Opioid pain medication guideline, pre-op physical form, and map  Patient expressed understanding of all forms given, questions were answered and will review in more detail at home.     Time spent with patient: 20 minutes.

## 2023-07-25 NOTE — LETTER
7/25/2023         RE: Aníbal Goldstein  [address] [facility] [address]        Dear Colleague,    Thank you for referring your patient, Aníbal Goldstein, to the [facility]. Please see a copy of my visit note below.    Assessment: This is a 67-year-old male with advanced left hip osteoarthritis associated with symptoms that are severe and limit his activities of daily living.  Today we discussed the diagnosis and the treatment options including living with it injection and total hip arthroplasty. We spent twenty minutes discussing total hip arthroplasty.  We discussed the implants, the procedure, the risks and benefits, and the post-operative course.  We discussed blood clots, blood clots to the lungs, injury to blood vessels and nerves, dislocation, infection, and leg length difference.  We discussed that as part of the routine part of surgical care a qualified assist may finish closing the wound after I have left the room.  We discussed the technical aspects of the surgery by reviewing templated radiographs.  All the patients questions were answered to the best of my ability.    Plan: He would like to continue working during his busy part of the year for him at work.  To facilitate this he like to have a steroid injection.  This has been ordered.  He would like to proceed with total hip arthroplasty in December.  I think this is reasonable considering his symptoms and radiographic changes.  Today surgery should be scheduled shoulder 3 months or more from his injection.  Orders placed for left total hip      Chief Complaint: Pain of the Right Hip and Pain of the Left Hip (L hip pain > R)      Physician:  Joe Kraus    HPI: Aníbal Goldstein is a 67 year old male who presents today for evaluation of his left hip     Location of symptoms:  groin   Onset: insidious  Duration of symptoms:about a year   Quality of symptoms: aching and sharp and loss of ROM   Severity:  moderate   Alleviating: activity modification  Exacerbating: activities  Previous Treatments: Previous treatments include activity modification, oral pain medication, physical therapy     Interested in trying a steroid injection if it does not interfere with the option of total hip in the winter (construction)    ACOSTA Jr: 46.65  PROMIS Mental: (P) 19  PROMIS Physical: (P) 12  PROMIS Total: (P) 31  [facility] Activity Scale:    MEDICAL HISTORY:   Past Medical History:   Diagnosis Date     Cervicalgia 01/2008     Hypertension 10-6-22     Pain in joint, shoulder region 01/2008       Pertinent negatives:  Patient has no history of DVT or PE. Discussed risk factors.    Medications:     Current Outpatient Medications:      acetaminophen (TYLENOL) 500 MG tablet, Take 500-1,000 mg by mouth every 6 hours as needed for mild pain, Disp: , Rfl:      atorvastatin (LIPITOR) 40 MG tablet, Take 1 tablet (40 mg) by mouth every evening, Disp: 90 tablet, Rfl: 3     ibuprofen (ADVIL/MOTRIN) 200 MG tablet, Take 400 mg by mouth every 4 hours as needed for pain, Disp: , Rfl:      lisinopril (ZESTRIL) 10 MG tablet, Take 0.5 tablets (5 mg) by mouth daily, Disp: 90 tablet, Rfl: 3     aspirin (ASA) 325 MG EC tablet, Take 1 tablet (325 mg) by mouth daily (Patient not taking: Reported on 7/25/2023), Disp: 90 tablet, Rfl: 3     meloxicam (MOBIC) 15 MG tablet, Take once daily for 1 week, then daily as needed after this timeframe (Patient not taking: Reported on 7/25/2023), Disp: 30 tablet, Rfl: 1    Allergies: No known allergies    SURGICAL HISTORY:   Past Surgical History:   Procedure Laterality Date     COLONOSCOPY       COLONOSCOPY WITH CO2 INSUFFLATION N/A 5/17/2021    Procedure: COLONOSCOPY, WITH CO2 INSUFFLATION;  Surgeon: Elder Bates DO;  Location: [facility] OR     HC KNEE SCOPE,MED/LAT MENISECTOMY  2/9/2009    Right knee     HERNIA REPAIR, INGUINAL RT/LT      lt ?     RELEASE CARPAL TUNNEL Right 3/1/2018    Procedure: RELEASE CARPAL TUNNEL;   "Right carpal tunnel release;  Surgeon: Britney Delgado MD;  Location: [facility] OR     ROTATOR CUFF REPAIR RT/LT  02/18/10    Right side. Done at [facility].       HISTORY:   Family History   Problem Relation Age of Onset     Diabetes Father         Adult onset     Alzheimer Disease Father      Alzheimer Disease Mother      Colon Cancer No family hx of      Prostate Cancer No family hx of      Breast Cancer No family hx of      Ulcerative Colitis No family hx of      Crohn's Disease No family hx of        SOCIAL HISTORY:   Social History     Tobacco Use     Smoking status: Never     Smokeless tobacco: Never   Substance Use Topics     Alcohol use: Not Currently     Alcohol/week: 1.7 standard drinks of alcohol     Comment: 0-1 per week    Working, drives truck  , lives with wife     REVIEW OF SYSTEMS:  The comprehensive review of systems from the intake form was reviewed with the patient.  No fever, weight change or fatigue. No dry eyes. No oral ulcers, sore throat or voice change. No palpitations, syncope, angina or edema.  No chest pain, excessive sleepiness, shortness of breath or hemoptysis.   No abdominal pain, nausea, vomiting, diarrhea or heartburn.  No skin rash. No focal weakness or numbness. No bleeding or lymphadenopathy. No rhinitis or hives.     Exam:  On physical examination the patient appears the stated age, is in no acute distress, alert and oriented, affect is appropriate, and breathing is non-labored.  Vitals are documented in the EMR and have been reviewed:    Ht 1.683 m (5' 6.25\")   Wt 93.8 kg (206 lb 14.4 oz)   BMI 33.14 kg/m    5' 6.25\"  Body mass index is 33.14 kg/m .    Rises from chair: Easily  Gait: Antalgic with less time on the left  Trendelenburg test:  Gains the exam table: Easily    RIGHT hip subjective: Fluid and painless  Abd: 30  Add: 10  Flexion: 100  IRF: 0  ERF: 30  Impingement test: Negative  Tenderness to palpation: No    LEFT hip subjective: A little " irritated  Abd: 15  Add: 5  Flexion: 90 and abduction and 65 in neutral  IRF: -20  ERF: 25  Impingement test: Positive groin reproduces symptoms  Tenderness to palpation no    Distal the circulatory, motor, and sensation exam is intact with 5/5 EHL, gastroc-soleus, and tibialis anterior.  Sensation to light touch is intact.  Dorsalis pedis and posterior tibialis pulses are palpable.  There are no sores on the feet, no bruising, and no lymphedema.    Imaging:   Tonnis 3 left hip        Again, thank you for allowing me to participate in the care of your patient.        Sincerely,        Jac Man MD

## 2023-07-26 NOTE — TELEPHONE ENCOUNTER
Date Scheduled: 11-29-23  Facility: Surgery Locations: [facility]  Surgeon: Dr. Man   Post-op appointment scheduled: 12-12   scheduled?: No  Surgery packet/instructions confirmed received?  Yes  Pre op physical/PAC appointment:   Special Considerations:     FAST TRACK    Amena Antonio  Surgery Scheduling Coordinator  Ph: [phone]

## 2023-07-31 ENCOUNTER — TELEPHONE (OUTPATIENT)
Dept: ORTHOPEDICS | Facility: CLINIC | Age: 68
End: 2023-07-31

## 2023-07-31 NOTE — TELEPHONE ENCOUNTER
Patient has surgery scheduled with Dr. Man in November. He is wanting to move forward with a hip injection from Dr. Kraus, per their last visit. He is wondering if it is possible to do both hips in one day. If not, he would like to do the left first. He also wants to make sure there is enough time in between the injection and his surgery.    Please call patient at [phone].    Thank you!

## 2023-08-04 NOTE — TELEPHONE ENCOUNTER
8/4 Patient is currently scheduled for this appointment.     Tessa reddy Procedure   Orthopedics, Podiatry, Sports Medicine, Ent ,Eye , Audiology, Adult Endocrine & Diabetes, Nutrition & Medication Therapy Management Specialties   [facility]

## 2023-08-07 ENCOUNTER — TELEPHONE (OUTPATIENT)
Dept: ORTHOPEDICS | Facility: CLINIC | Age: 68
End: 2023-08-07
Payer: COMMERCIAL

## 2023-08-07 NOTE — TELEPHONE ENCOUNTER
Called and left  for patient asking what their questions were regarding the injection.    Wood DEE ATC

## 2023-08-07 NOTE — TELEPHONE ENCOUNTER
[facility] Call Center    Phone Message    May a detailed message be left on voicemail: yes     Reason for Call: Other: Patient is requesting a call back to discuss cortisone inj scheduled for later this month.     Action Taken: Message routed to:  Adult Clinics: Sports Medicine p [phone]    Travel Screening: Not Applicable

## 2023-08-25 ENCOUNTER — OFFICE VISIT (OUTPATIENT)
Dept: ORTHOPEDICS | Facility: CLINIC | Age: 68
End: 2023-08-25
Payer: COMMERCIAL

## 2023-08-25 DIAGNOSIS — M25.552 BILATERAL HIP PAIN: Primary | ICD-10-CM

## 2023-08-25 DIAGNOSIS — M25.551 BILATERAL HIP PAIN: Primary | ICD-10-CM

## 2023-08-25 PROCEDURE — 20611 DRAIN/INJ JOINT/BURSA W/US: CPT | Mod: 50 | Performed by: FAMILY MEDICINE

## 2023-08-25 RX ORDER — TRIAMCINOLONE ACETONIDE 40 MG/ML
40 INJECTION, SUSPENSION INTRA-ARTICULAR; INTRAMUSCULAR
Status: SHIPPED | OUTPATIENT
Start: 2023-08-25

## 2023-08-25 RX ADMIN — TRIAMCINOLONE ACETONIDE 40 MG: 40 INJECTION, SUSPENSION INTRA-ARTICULAR; INTRAMUSCULAR at 11:51

## 2023-08-25 ASSESSMENT — PAIN SCALES - GENERAL: PAINLEVEL: MILD PAIN (2)

## 2023-08-25 NOTE — LETTER
8/25/2023         RE: Aníbal Goldstein  [address] [facility] [address]        Dear Colleague,    Thank you for referring your patient, Aníbal Goldstein, to the [facility] [place]. Please see a copy of my visit note below.    Sachin is following up with bilateral hip pain.  He is here for bilateral hip injections.      Large Joint Injection/Arthocentesis: bilateral hip joint    Date/Time: 8/25/2023 11:51 AM    Performed by: Joe Kraus DO  Authorized by: Joe Kraus DO    Indications:  Pain  Needle Size:  22 G  Guidance: ultrasound    Location:  Hip  Laterality:  Bilateral      Site:  Bilateral hip joint  Medications (Right):  40 mg triamcinolone 40 MG/ML  Medications (Left):  40 mg triamcinolone 40 MG/ML  Outcome:  Tolerated well, no immediate complications  Procedure discussed: discussed risks, benefits, and alternatives    Consent Given by:  Patient  Timeout: timeout called immediately prior to procedure    Prep: patient was prepped and draped in usual sterile fashion       Intraarticular Hip Injection - Ultrasound Guided    The patient was informed of the risks and the benefits of the procedure and a written consent was signed.  The patient's right hip was prepped with chlorhexidine in sterile fashion.   40 mg of triamcinolone suspension was drawn up into a 5 mL syringe with 4 mL of 1% lidocaine.  Injection was performed using sterile technique.  Under ultrasound guidance a 3.5-inch 22-gauge needle was used to enter the femoracetabular joint.  Anterior approach was used, needle placement was visualized and documented with ultrasound.  Ultrasound visualization was necessary due to decreased joint space in the setting of osteoarthritis.  Injection performed long axis to the probe.  Injection solution visualized within the joint space.  Images were permanently stored for the patient's record.    The patient's left hip was prepped with chlorhexidine  in sterile fashion.   40 mg of triamcinolone suspension was drawn up into a 5 mL syringe with 4 mL of 1% lidocaine.  Injection was performed using sterile technique.  Under ultrasound guidance a 3.5-inch 22-gauge needle was used to enter the femoracetabular joint.  Anterior approach was used, needle placement was visualized and documented with ultrasound.  Ultrasound visualization was necessary due to decreased joint space in the setting of osteoarthritis.  Injection performed long axis to the probe.  Injection solution visualized within the joint space.  Images were permanently stored for the patient's record.    There were no complications. The patient tolerated the procedure well. There was negligible bleeding.               Again, thank you for allowing me to participate in the care of your patient.        Sincerely,        Joe Kraus DO

## 2023-08-25 NOTE — NURSING NOTE
Chief Complaint   Patient presents with    Left Hip - Follow Up, Pain       There were no vitals filed for this visit.    There is no height or weight on file to calculate BMI.      JARETH Burton NREMT

## 2023-08-25 NOTE — PROGRESS NOTES
Sachin is following up with bilateral hip pain.  He is here for bilateral hip injections.      Large Joint Injection/Arthocentesis: bilateral hip joint    Date/Time: 8/25/2023 11:51 AM    Performed by: Joe Kraus DO  Authorized by: Joe Kraus DO    Indications:  Pain  Needle Size:  22 G  Guidance: ultrasound    Location:  Hip  Laterality:  Bilateral      Site:  Bilateral hip joint  Medications (Right):  40 mg triamcinolone 40 MG/ML  Medications (Left):  40 mg triamcinolone 40 MG/ML  Outcome:  Tolerated well, no immediate complications  Procedure discussed: discussed risks, benefits, and alternatives    Consent Given by:  Patient  Timeout: timeout called immediately prior to procedure    Prep: patient was prepped and draped in usual sterile fashion       Intraarticular Hip Injection - Ultrasound Guided    The patient was informed of the risks and the benefits of the procedure and a written consent was signed.  The patient's right hip was prepped with chlorhexidine in sterile fashion.   40 mg of triamcinolone suspension was drawn up into a 5 mL syringe with 4 mL of 1% lidocaine.  Injection was performed using sterile technique.  Under ultrasound guidance a 3.5-inch 22-gauge needle was used to enter the femoracetabular joint.  Anterior approach was used, needle placement was visualized and documented with ultrasound.  Ultrasound visualization was necessary due to decreased joint space in the setting of osteoarthritis.  Injection performed long axis to the probe.  Injection solution visualized within the joint space.  Images were permanently stored for the patient's record.    The patient's left hip was prepped with chlorhexidine in sterile fashion.   40 mg of triamcinolone suspension was drawn up into a 5 mL syringe with 4 mL of 1% lidocaine.  Injection was performed using sterile technique.  Under ultrasound guidance a 3.5-inch 22-gauge needle was used to enter the femoracetabular joint.  Anterior approach  was used, needle placement was visualized and documented with ultrasound.  Ultrasound visualization was necessary due to decreased joint space in the setting of osteoarthritis.  Injection performed long axis to the probe.  Injection solution visualized within the joint space.  Images were permanently stored for the patient's record.    There were no complications. The patient tolerated the procedure well. There was negligible bleeding.

## 2023-08-25 NOTE — NURSING NOTE
[facility]   ORTHOPEDICS & SPORTS MEDICINE  [address] [place] [address]  Dept: [phone]  ______________________________________________________________________________    Patient: Aníbal Goldstein   : 1955   MRN: [id number]   2023    INVASIVE PROCEDURE SAFETY CHECKLIST    Date: 2023   Procedure: Bilateral hip injections  Patient Name: Aníbal Goldstein  MRN: [id number]  YOB: 1955    Action: Complete sections as appropriate. Any discrepancy results in a HARD COPY until resolved.     PRE PROCEDURE:  Patient ID verified with 2 identifiers (name and  or MRN): Yes  Procedure and site verified with patient/designee (when able): Yes  Accurate consent documentation in medical record: Yes  H&P (or appropriate assessment) documented in medical record: Yes  H&P must be up to 20 days prior to procedure and updates within 24 hours of procedure as applicable: NA  Relevant diagnostic and radiology test results appropriately labeled and displayed as applicable: NA  Procedure site(s) marked with provider initials: NA    TIMEOUT:  Time-Out performed immediately prior to starting procedure, including verbal and active participation of all team members addressing the following:Yes  * Correct patient identify  * Confirmed that the correct side and site are marked  * An accurate procedure consent form  * Agreement on the procedure to be done  * Correct patient position  * Relevant images and results are properly labeled and appropriately displayed  * The need to administer antibiotics or fluids for irrigation purposes during the procedure as applicable   * Safety precautions based on patient history or medication use    DURING PROCEDURE: Verification of correct person, site, and procedures any time the responsibility for care of the patient is transferred to another member of the care team.       Prior to injection, verified patient identity using patient's name and date  of birth.  Due to injection administration, patient instructed to remain in clinic for 15 minutes  afterwards, and to report any adverse reaction to me immediately.    Joint injection was performed.      Drug Amount Wasted:  None.  Vial/Syringe: Single dose vial  Expiration Date:  4/30/2025      Micheal Gamino, EMT  August 25, 2023

## 2023-10-17 ASSESSMENT — ENCOUNTER SYMPTOMS
NERVOUS/ANXIOUS: 0
ARTHRALGIAS: 1
COUGH: 0
SHORTNESS OF BREATH: 0
CHILLS: 0
HEMATURIA: 0
MYALGIAS: 0
PARESTHESIAS: 0
FREQUENCY: 0
HEADACHES: 0
DIARRHEA: 0
JOINT SWELLING: 0
WEAKNESS: 0
SORE THROAT: 0
DYSURIA: 0
PALPITATIONS: 0
HEMATOCHEZIA: 0
HEARTBURN: 0
DIZZINESS: 0
EYE PAIN: 0
NAUSEA: 0
FEVER: 0
CONSTIPATION: 0
ABDOMINAL PAIN: 0

## 2023-10-17 ASSESSMENT — ACTIVITIES OF DAILY LIVING (ADL): CURRENT_FUNCTION: NO ASSISTANCE NEEDED

## 2023-10-18 ENCOUNTER — OFFICE VISIT (OUTPATIENT)
Dept: FAMILY MEDICINE | Facility: CLINIC | Age: 68
End: 2023-10-18
Attending: FAMILY MEDICINE
Payer: COMMERCIAL

## 2023-10-18 VITALS
TEMPERATURE: 98 F | WEIGHT: 209 LBS | RESPIRATION RATE: 16 BRPM | HEART RATE: 85 BPM | DIASTOLIC BLOOD PRESSURE: 68 MMHG | BODY MASS INDEX: 33.59 KG/M2 | OXYGEN SATURATION: 95 % | SYSTOLIC BLOOD PRESSURE: 126 MMHG | HEIGHT: 66 IN

## 2023-10-18 DIAGNOSIS — Z12.5 SCREENING FOR PROSTATE CANCER: ICD-10-CM

## 2023-10-18 DIAGNOSIS — H53.121 TRANSIENT VISUAL LOSS, RIGHT: ICD-10-CM

## 2023-10-18 DIAGNOSIS — Z23 NEED FOR IMMUNIZATION AGAINST INFLUENZA: ICD-10-CM

## 2023-10-18 DIAGNOSIS — E78.2 MIXED HYPERLIPIDEMIA: Chronic | ICD-10-CM

## 2023-10-18 DIAGNOSIS — M25.552 BILATERAL HIP PAIN: ICD-10-CM

## 2023-10-18 DIAGNOSIS — I10 BENIGN ESSENTIAL HYPERTENSION: Chronic | ICD-10-CM

## 2023-10-18 DIAGNOSIS — Z28.21 COVID-19 VACCINATION DECLINED: ICD-10-CM

## 2023-10-18 DIAGNOSIS — M25.551 BILATERAL HIP PAIN: ICD-10-CM

## 2023-10-18 DIAGNOSIS — Z00.00 ENCOUNTER FOR MEDICARE ANNUAL WELLNESS EXAM: Primary | ICD-10-CM

## 2023-10-18 DIAGNOSIS — R73.03 PREDIABETES: ICD-10-CM

## 2023-10-18 DIAGNOSIS — H61.23 BILATERAL IMPACTED CERUMEN: ICD-10-CM

## 2023-10-18 PROCEDURE — 99214 OFFICE O/P EST MOD 30 MIN: CPT | Mod: 25 | Performed by: FAMILY MEDICINE

## 2023-10-18 PROCEDURE — 99397 PER PM REEVAL EST PAT 65+ YR: CPT | Mod: 25 | Performed by: FAMILY MEDICINE

## 2023-10-18 PROCEDURE — G0008 ADMIN INFLUENZA VIRUS VAC: HCPCS | Performed by: FAMILY MEDICINE

## 2023-10-18 PROCEDURE — 90662 IIV NO PRSV INCREASED AG IM: CPT | Performed by: FAMILY MEDICINE

## 2023-10-18 PROCEDURE — 69209 REMOVE IMPACTED EAR WAX UNI: CPT | Mod: 50 | Performed by: FAMILY MEDICINE

## 2023-10-18 RX ORDER — ATORVASTATIN CALCIUM 40 MG/1
40 TABLET, FILM COATED ORAL EVERY EVENING
Qty: 90 TABLET | Refills: 3 | Status: SHIPPED | OUTPATIENT
Start: 2023-10-18

## 2023-10-18 RX ORDER — LISINOPRIL 10 MG/1
5 TABLET ORAL DAILY
Qty: 90 TABLET | Refills: 3 | Status: SHIPPED | OUTPATIENT
Start: 2023-10-18

## 2023-10-18 ASSESSMENT — ENCOUNTER SYMPTOMS
HEMATURIA: 0
CONSTIPATION: 0
NAUSEA: 0
HEADACHES: 0
PALPITATIONS: 0
EYE PAIN: 0
CHILLS: 0
SORE THROAT: 0
DIZZINESS: 0
FREQUENCY: 0
PARESTHESIAS: 0
SHORTNESS OF BREATH: 0
DIARRHEA: 0
ARTHRALGIAS: 1
HEMATOCHEZIA: 0
ABDOMINAL PAIN: 0
DYSURIA: 0
COUGH: 0
FEVER: 0
JOINT SWELLING: 0
MYALGIAS: 0
NERVOUS/ANXIOUS: 0
HEARTBURN: 0
WEAKNESS: 0

## 2023-10-18 ASSESSMENT — ACTIVITIES OF DAILY LIVING (ADL): CURRENT_FUNCTION: NO ASSISTANCE NEEDED

## 2023-10-18 ASSESSMENT — PAIN SCALES - GENERAL: PAINLEVEL: MILD PAIN (2)

## 2023-10-18 NOTE — NURSING NOTE
Patient identified using two patient identifiers.  Ear exam showing wax occlusion completed by provider.  Solution: warm water was placed in the bilateral ear(s) via irrigation tool: elephant ear.  Olivia Hobbs [facility]

## 2023-10-18 NOTE — PATIENT INSTRUCTIONS
Important Takeaway Points From This Visit:  You need to hold your aspirin 10 days prior to the surgery.      As always, please call with any questions or concerns. I look forward to seeing you again soon!    Take care,  Dr. Navarrete    Your current medication list is printed. Please keep this with you - it is helpful to bring this current list to any other medical appointments. It can also be helpful if you ever go to the emergency room or hospital.    If you had lab testing today we will call you with the results. The phone number we will call with your results is # [phone] (home) [phone] (work). If this is not the best number please call our clinic and change the number.    If you need any refills, please call your pharmacy and they will contact us.    If you have any further concerns or wish to schedule another appointment, please call our office at [phone].    If you have a medical emergency, please call [phone].    Thank you for coming to [facility]!      Preventive Health Recommendations:     See your health care provider every year to  Review health changes.   Discuss preventive care.    Review your medicines if your doctor has prescribed any.    Talk with your health care provider about whether you should have a test to screen for prostate cancer (PSA).  Every 3 years, have a diabetes test (fasting glucose). If you are at risk for diabetes, you should have this test more often.  Every 5 years, have a cholesterol test. Have this test more often if you are at risk for high cholesterol or heart disease.   Every 10 years, have a colonoscopy. Or, have a yearly FIT test (stool test). These exams will check for colon cancer.  Talk to with your health care provider about screening for Abdominal Aortic Aneurysm if you have a family history of AAA or have a history of smoking.    Shots:   Get a flu shot each year.   Get a tetanus shot every 10 years.   Talk to your doctor about your  pneumonia vaccines. There are now two you should receive - Pneumovax (PPSV 23) and Prevnar (PCV 13).   Talk to your pharmacist about a shingles vaccine.   Talk to your doctor about the hepatitis B vaccine.  Nutrition:   Eat at least 5 servings of fruits and vegetables each day.   Eat whole-grain bread, whole-wheat pasta and brown rice instead of white grains and rice.   Get adequate Calcium and Vitamin D.   Lifestyle  Exercise for at least 150 minutes a week (30 minutes a day, 5 days a week). This will help you control your weight and prevent disease.   Limit alcohol to one drink per day.   No smoking.   Wear sunscreen to prevent skin cancer.  See your dentist every six months for an exam and cleaning.  See your eye doctor every 1 to 2 years to screen for conditions such as glaucoma, macular degeneration, cataracts, etc.    Personalized Prevention Plan  You are due for the preventive services outlined below.  Your care team is available to assist you in scheduling these services.  If you have already completed any of these items, please share that information with your care team to update in your medical record.  Health Maintenance Due   Topic Date Due     RSV VACCINE 60+ (1 - 1-dose 60+ series) Never done     Pneumococcal Vaccine (1 - PCV) Never done     AORTIC ANEURYSM SCREENING (SYSTEM ASSIGNED)  Never done     COVID-19 Vaccine (4 - 2023-24 season) 09/01/2023     Patient Education   Personalized Prevention Plan  You are due for the preventive services outlined below.  Your care team is available to assist you in scheduling these services.  If you have already completed any of these items, please share that information with your care team to update in your medical record.  Health Maintenance Due   Topic Date Due     RSV VACCINE 60+ (1 - 1-dose 60+ series) Never done     Pneumococcal Vaccine (1 - PCV) Never done     AORTIC ANEURYSM SCREENING (SYSTEM ASSIGNED)  Never done     COVID-19 Vaccine (4 - 2023-24 season)  "09/01/2023     Learning About Being Physically Active  What is physical activity?     Being physically active means doing any kind of activity that gets your body moving.  The types of physical activity that can help you get fit and stay healthy include:  Aerobic or \"cardio\" activities. These make your heart beat faster and make you breathe harder, such as brisk walking, riding a bike, or running. They strengthen your heart and lungs and build up your endurance.  Strength training activities. These make your muscles work against, or \"resist,\" something. Examples include lifting weights or doing push-ups. These activities help tone and strengthen your muscles and bones.  Stretches. These let you move your joints and muscles through their full range of motion. Stretching helps you be more flexible.  Reaching a balance between these three types of physical activity is important because each one contributes to your overall fitness.  What are the benefits of being active?  Being active is one of the best things you can do for your health. It helps you to:  Feel stronger and have more energy to do all the things you like to do.  Focus better at school or work.  Feel, think, and sleep better.  Reach and stay at a healthy weight.  Lose fat and build lean muscle.  Lower your risk for serious health problems, including diabetes, heart attack, high blood pressure, and some cancers.  Keep your heart, lungs, bones, muscles, and joints strong and healthy.  How can you make being active part of your life?  Start slowly. Make it your long-term goal to get at least 30 minutes of exercise on most days of the week. Walking is a good choice. You also may want to do other activities, such as running, swimming, cycling, or playing tennis or team sports.  Pick activities that you like--ones that make your heart beat faster, your muscles stronger, and your muscles and joints more flexible. If you find more than one thing you like doing, do " "them all. You don't have to do the same thing every day.  Get your heart pumping every day. Any activity that makes your heart beat faster and keeps it at that rate for a while counts.  Here are some great ways to get your heart beating faster:  Go for a brisk walk, run, or bike ride.  Go for a hike or swim.  Go in-line skating.  Play a game of touch football, basketball, or soccer.  Ride a bike.  Play tennis or racquetball.  Climb stairs.  Even some household chores can be aerobic--just do them at a faster pace. Vacuuming, raking or mowing the lawn, sweeping the garage, and washing and waxing the car all can help get your heart rate up.  Strengthen your muscles during the week. You don't have to lift heavy weights or grow big, bulky muscles to get stronger. Doing a few simple activities that make your muscles work against, or \"resist,\" something can help you get stronger.  For example, you can:  Do push-ups or sit-ups, which use your own body weight as resistance.  Lift weights or dumbbells or use stretch bands at home or in a gym or community center.  Stretch your muscles often. Stretching will help you as you become more active. It can help you stay flexible, loosen tight muscles, and avoid injury. It can also help improve your balance and posture and can be a great way to relax.  Be sure to stretch the muscles you'll be using when you work out. It's best to warm your muscles slightly before you stretch them. Walk or do some other light aerobic activity for a few minutes, and then start stretching.  When you stretch your muscles:  Do it slowly. Stretching is not about going fast or making sudden movements.  Don't push or bounce during a stretch.  Hold each stretch for at least 15 to 30 seconds, if you can. You should feel a stretch in the muscle, but not pain.  Breathe out as you do the stretch. Then breathe in as you hold the stretch. Don't hold your breath.  If you're worried about how more activity might " "affect your health, have a checkup before you start. Follow any special advice your doctor gives you for getting a smart start.  Where can you learn more?  Go to https://www.[employer].net/patiented  Enter W332 in the search box to learn more about \"Learning About Being Physically Active.\"  Current as of: October 10, 2022               Content Version: 13.7    0201-4973 [employer].   Care instructions adapted under license by your healthcare professional. If you have questions about a medical condition or this instruction, always ask your healthcare professional. [employer] disclaims any warranty or liability for your use of this information.      Learning About Dietary Guidelines  What are the Dietary Guidelines for Americans?     Dietary Guidelines for Americans provide tips for eating well and staying healthy. This helps reduce the risk for long-term (chronic) diseases.  These guidelines recommend that you:  Eat and drink the right amount for you. The [place] government's food guide is called MyPlate. It can help you make your own well-balanced eating plan.  Try to balance your eating with your activity. This helps you stay at a healthy weight.  Drink alcohol in moderation, if at all.  Limit foods high in salt, saturated fat, trans fat, and added sugar.  These guidelines are from the [place] Department of Agriculture and the [place] Department of Health and Human Services. They are updated every 5 years.  What is MyPlate?  MyPlate is the [place] government's food guide. It can help you make your own well-balanced eating plan. A balanced eating plan means that you eat enough, but not too much, and that your food gives you the nutrients you need to stay healthy.  MyPlate focuses on eating plenty of whole grains, fruits, and vegetables, and on limiting fat and sugar. It is available online at www.ChooseMyPlate.gov.  How can you get started?  If you're trying to eat healthier, you can slowly change " "your eating habits over time. You don't have to make big changes all at once. Start by adding one or two healthy foods to your meals each day.  Grains  Choose whole-grain breads and cereals and whole-wheat pasta and whole-grain crackers.  Vegetables  Eat a variety of vegetables every day. They have lots of nutrients and are part of a heart-healthy diet.  Fruits  Eat a variety of fruits every day. Fruits contain lots of nutrients. Choose fresh fruit instead of fruit juice.  Protein foods  Choose fish and lean poultry more often. Eat red meat and fried meats less often. Dried beans, tofu, and nuts are also good sources of protein.  Dairy  Choose low-fat or fat-free products from this food group. If you have problems digesting milk, try eating cheese or yogurt instead.  Fats and oils  Limit fats and oils if you're trying to cut calories. Choose healthy fats when you cook. These include canola oil and olive oil.  Where can you learn more?  Go to https://www.[employer].net/patiented  Enter D676 in the search box to learn more about \"Learning About Dietary Guidelines.\"  Current as of: March 1, 2023               Content Version: 13.7    3852-5681 [employer].   Care instructions adapted under license by your healthcare professional. If you have questions about a medical condition or this instruction, always ask your healthcare professional. [employer] disclaims any warranty or liability for your use of this information.         "

## 2023-10-18 NOTE — PROGRESS NOTES
"SUBJECTIVE:   Aníbal is a 68 year old who presents for Preventive Visit.      10/18/2023     4:23 PM   Additional Questions   Roomed by YK   Accompanied by self     Patient has surgery on 11/29, wondering if he can have this visit addended in a couple weeks for preop also.    Are you in the first 12 months of your Medicare coverage?  No    Healthy Habits:     In general, how would you rate your overall health?  Good    Frequency of exercise:  1 day/week    Duration of exercise:  15-30 minutes    Do you usually eat at least 4 servings of fruit and vegetables a day, include whole grains    & fiber and avoid regularly eating high fat or \"junk\" foods?  No    Taking medications regularly:  Yes    Medication side effects:  None    Ability to successfully perform activities of daily living:  No assistance needed    Home Safety:  No safety concerns identified    Hearing Impairment:  No hearing concerns    In the past 6 months, have you been bothered by leaking of urine?  No    In general, how would you rate your overall mental or emotional health?  Good    Upcoming left hip arthroplasty end of November.    Needing medication refills. No side effects.    Ok with flu shot to help protect his wife. Declines covid.    No other concerns.    Have you ever done Advance Care Planning? (For example, a Health Directive, POLST, or a discussion with a medical provider or your loved ones about your wishes): No, advance care planning information given to patient to review.  Patient plans to discuss their wishes with loved ones or provider.       Fall risk  Fallen 2 or more times in the past year?: No  Any fall with injury in the past year?: No    Cognitive Screening   1) Repeat 3 items (Leader, Season, Table)    2) Clock draw: NORMAL  3) 3 item recall: Recalls 2 objects   Results: NORMAL clock, 1-2 items recalled: COGNITIVE IMPAIRMENT LESS LIKELY    Mini-CogTM Copyright S Hemanth. Licensed by the author for use in [facility] " Services; reprinted with permission (soob@[facility]). All rights reserved.      Reviewed and updated as needed this visit by clinical staff   Tobacco  Allergies  Meds  Problems  Med Hx  Surg Hx  Fam Hx          Reviewed and updated as needed this visit by Provider   Tobacco  Allergies  Meds  Problems  Med Hx  Surg Hx  Fam Hx       Social Hx Reviewed  Social History     Tobacco Use    Smoking status: Never    Smokeless tobacco: Never   Substance Use Topics    Alcohol use: Not Currently     Alcohol/week: 1.7 standard drinks of alcohol     Comment: 0-1 per week          10/17/2023     8:15 PM   Alcohol Use   Prescreen: >3 drinks/day or >7 drinks/week? Not Applicable          No data to display              Do you have a current opioid prescription? No  Do you use any other controlled substances or medications that are not prescribed by a provider? None    Current providers sharing in care for this patient include:   Patient Care Team:  Jaime Navarrete MD as PCP - General (Family Medicine)  Driss hCu MD as MD (Family Medicine - Sports Medicine)  Jaime Navarrete MD as Assigned PCP  Pancho Reilly MD as MD (Ophthalmology)  Jaime Navarrete MD as Referring Physician (Family Medicine)  Jac Man MD as Assigned Musculoskeletal Provider    The following health maintenance items are reviewed in Epic and correct as of today:  Health Maintenance   Topic Date Due    RSV VACCINE 60+ (1 - 1-dose 60+ series) Never done    Pneumococcal Vaccine: 65+ Years (1 - PCV) Never done    COVID-19 Vaccine (4 - 2023-24 season) 09/01/2023    MEDICARE ANNUAL WELLNESS VISIT  10/18/2024    ANNUAL REVIEW OF HM ORDERS  10/18/2024    FALL RISK ASSESSMENT  10/18/2024    ADVANCE CARE PLANNING  04/07/2026    LIPID  10/05/2027    COLORECTAL CANCER SCREENING  05/17/2028    DTAP/TDAP/TD IMMUNIZATION (5 - Td or Tdap) 01/04/2031    PHQ-2 (once per calendar year)  Completed    INFLUENZA VACCINE   Completed    ZOSTER IMMUNIZATION  Completed    IPV IMMUNIZATION  Aged Out    HPV IMMUNIZATION  Aged Out    MENINGITIS IMMUNIZATION  Aged Out    HEPATITIS C SCREENING  Discontinued    AORTIC ANEURYSM SCREENING (SYSTEM ASSIGNED)  Discontinued     Lab work is in process  BP Readings from Last 3 Encounters:   10/18/23 126/68   02/21/23 136/82   10/14/22 129/74    Wt Readings from Last 3 Encounters:   10/18/23 94.8 kg (209 lb)   07/25/23 93.8 kg (206 lb 14.4 oz)   02/21/23 94.3 kg (208 lb)                  Patient Active Problem List   Diagnosis    Cervicalgia    Infection due to 2019 novel coronavirus    Transient visual loss, right    Mixed hyperlipidemia    Benign essential hypertension    Prediabetes     Past Surgical History:   Procedure Laterality Date    ABDOMEN SURGERY  1991    Hernia repair    COLONOSCOPY      COLONOSCOPY WITH CO2 INSUFFLATION N/A 05/17/2021    Procedure: COLONOSCOPY, WITH CO2 INSUFFLATION;  Surgeon: Elder Bates DO;  Location: [facility] OR    HC KNEE SCOPE,MED/LAT MENISECTOMY  02/09/2009    Right knee    HERNIA REPAIR, INGUINAL RT/LT      lt ?    RELEASE CARPAL TUNNEL Right 03/01/2018    Procedure: RELEASE CARPAL TUNNEL;  Right carpal tunnel release;  Surgeon: Britney Delgado MD;  Location: [facility] OR    ROTATOR CUFF REPAIR RT/LT  02/18/2010    Right side. Done at [facility].       Social History     Tobacco Use    Smoking status: Never    Smokeless tobacco: Never   Substance Use Topics    Alcohol use: Not Currently     Alcohol/week: 1.7 standard drinks of alcohol     Comment: 0-1 per week      Family History   Problem Relation Age of Onset    Diabetes Father         Adult onset    Alzheimer Disease Father     Alzheimer Disease Mother     Colon Cancer Mother     Colon Cancer Maternal Grandmother     Prostate Cancer No family hx of     Breast Cancer No family hx of     Ulcerative Colitis No family hx of     Crohn's Disease No family hx of          Current Outpatient Medications  "  Medication Sig Dispense Refill    acetaminophen (TYLENOL) 500 MG tablet Take 500-1,000 mg by mouth every 6 hours as needed for mild pain      atorvastatin (LIPITOR) 40 MG tablet Take 1 tablet (40 mg) by mouth every evening 90 tablet 3    ibuprofen (ADVIL/MOTRIN) 200 MG tablet Take 400 mg by mouth every 4 hours as needed for pain      lisinopril (ZESTRIL) 10 MG tablet Take 0.5 tablets (5 mg) by mouth daily 90 tablet 3    aspirin (ASA) 325 MG EC tablet Take 1 tablet (325 mg) by mouth daily (Patient not taking: Reported on 7/25/2023) 90 tablet 3     Allergies   Allergen Reactions    No Known Allergies      Review of Systems   Constitutional:  Negative for chills and fever.   HENT:  Negative for congestion, ear pain, hearing loss and sore throat.    Eyes:  Negative for pain and visual disturbance.   Respiratory:  Negative for cough and shortness of breath.    Cardiovascular:  Negative for chest pain, palpitations and peripheral edema.   Gastrointestinal:  Negative for abdominal pain, constipation, diarrhea, heartburn, hematochezia and nausea.   Genitourinary:  Negative for dysuria, frequency, genital sores, hematuria, impotence, penile discharge and urgency.   Musculoskeletal:  Positive for arthralgias. Negative for joint swelling and myalgias.   Skin:  Negative for rash.   Neurological:  Negative for dizziness, weakness, headaches and paresthesias.   Psychiatric/Behavioral:  Negative for mood changes. The patient is not nervous/anxious.      OBJECTIVE:   /68   Pulse 85   Temp 98  F (36.7  C) (Temporal)   Resp 16   Ht 1.672 m (5' 5.83\")   Wt 94.8 kg (209 lb)   SpO2 95%   BMI 33.91 kg/m   Estimated body mass index is 33.91 kg/m  as calculated from the following:    Height as of this encounter: 1.672 m (5' 5.83\").    Weight as of this encounter: 94.8 kg (209 lb).  Physical Exam  GENERAL: Obese male. Healthy, alert and no distress  EYES: Eyes grossly normal to inspection, PERRL and conjunctivae and sclerae " normal  HENT: Bilateral cerumen impaction. Nose and mouth without ulcers or lesions  NECK: no adenopathy, no asymmetry, masses, or scars and thyroid normal to palpation  RESP: lungs clear to auscultation - no rales, rhonchi or wheezes  CV: regular rate and rhythm, normal S1 S2, no S3 or S4, no murmur, click or rub, no peripheral edema and peripheral pulses strong  ABDOMEN: soft, nontender, no hepatosplenomegaly, no masses and bowel sounds normal  MS: no gross musculoskeletal defects noted, no edema  SKIN: no suspicious lesions or rashes  NEURO: Normal strength and tone, mentation intact and speech normal  PSYCH: mentation appears normal, affect normal/bright    Labs: pending    ASSESSMENT / PLAN:   1. Encounter for Medicare annual wellness exam  Discussed personal health and safety. Routine screenings as below. Appropriate anticipatory guidance, vaccinations, and health screening recommendations delivered according to the USPSTF and other appropriate society guidelines.  Patient understands and is agreeable with the plan ordered below.  - PRIMARY CARE FOLLOW-UP SCHEDULING  - REVIEW OF HEALTH MAINTENANCE PROTOCOL ORDERS  - Lipid panel reflex to direct LDL Non-fasting; Future  - CBC with platelets; Future  - Basic metabolic panel  (Ca, Cl, CO2, Creat, Gluc, K, Na, BUN); Future  - Hemoglobin A1c; Future  - INFLUENZA VACCINE 65+ (FLUZONE HD)  - VACCINE ADMINISTRATION, INITIAL  - PSA, screen; Future  - DC REMOVAL IMPACTED CERUMEN IRRIGATION/LVG UNILAT  - PRIMARY CARE FOLLOW-UP SCHEDULING; Future    2. Benign essential hypertension  Monitoring labs ordered. Medication refilled as below. Tolerating medication well.  - Basic metabolic panel  (Ca, Cl, CO2, Creat, Gluc, K, Na, BUN); Future  - lisinopril (ZESTRIL) 10 MG tablet; Take 0.5 tablets (5 mg) by mouth daily  Dispense: 90 tablet; Refill: 3    3. Mixed hyperlipidemia  Check labs to monitor efficacy of interventions (medication, lifestyle, etc). No side effects reported.  Continue on current therapy. See medication list for more details. Ok for refill of current lipid lowering medications for next 1 year. Will need follow-up visit with labs in 1 year.  - Lipid panel reflex to direct LDL Non-fasting; Future  - atorvastatin (LIPITOR) 40 MG tablet; Take 1 tablet (40 mg) by mouth every evening  Dispense: 90 tablet; Refill: 3    4. Prediabetes  Monitor.  - Basic metabolic panel  (Ca, Cl, CO2, Creat, Gluc, K, Na, BUN); Future  - Hemoglobin A1c; Future    5. Transient visual loss, right  Concern for prior TIA. Continue aspirin therapy and risk modification by control of above conditions.  - atorvastatin (LIPITOR) 40 MG tablet; Take 1 tablet (40 mg) by mouth every evening  Dispense: 90 tablet; Refill: 3  - lisinopril (ZESTRIL) 10 MG tablet; Take 0.5 tablets (5 mg) by mouth daily  Dispense: 90 tablet; Refill: 3    6. Bilateral hip pain  Upcoming procedure, will schedule pre-op  - CBC with platelets; Future    7. Bilateral impacted cerumen  Flushed by MA staff  - GA REMOVAL IMPACTED CERUMEN IRRIGATION/LVG UNILAT    8. Screening for prostate cancer  Patient elects to undergo PSA screening after informed decision making process. This discussion with the patient included reviewing the benefits of testing such as: Ability to easily add on to existing blood work, screening for a common cancer in men which has treatment options and otherwise may have been silent, and possibility for early detection to prevent morbidity from future metastasis and/or death. Additionally, risks were discussed including possibility of false positive testing (leading to anxiety and further unnecessary testing/biopsies), possibility of over treating a cancer that may not affect a man in his lifetime, and the side effects of the current treatment options for prosate cancer (urinary incontinence, ED, etc).  - PSA, screen; Future    9. Need for immunization against influenza  - INFLUENZA VACCINE 65+ (FLUZONE HD)  - VACCINE  "ADMINISTRATION, INITIAL    10. COVID-19 vaccination declined      COUNSELING:  Reviewed preventive health counseling, as reflected in patient instructions       Regular exercise       Healthy diet/nutrition       Vision screening       Hearing screening       Dental care       Fall risk prevention       Colon cancer screening       Prostate cancer screening      BMI:   Estimated body mass index is 33.91 kg/m  as calculated from the following:    Height as of this encounter: 1.672 m (5' 5.83\").    Weight as of this encounter: 94.8 kg (209 lb).   Weight management plan: Discussed healthy diet and exercise guidelines      He reports that he has never smoked. He has never used smokeless tobacco.      Appropriate preventive services were discussed with this patient, including applicable screening as appropriate for fall prevention, nutrition, physical activity, Tobacco-use cessation, weight loss and cognition.  Checklist reviewing preventive services available has been given to the patient.    Reviewed patients plan of care and provided an AVS. The Basic Care Plan (routine screening as documented in Health Maintenance) for Aníbal meets the Care Plan requirement. This Care Plan has been established and reviewed with the Patient.    Jaime Navarrete MD  [facility]    Disclaimer: This note consists of symbols derived from keyboarding, dictation and/or voice recognition software. As a result, there may be errors in the script that have gone undetected. Please consider this when interpreting information found in this chart.    Identified Health Risks:  I have reviewed Opioid Use Disorder and Substance Use Disorder risk factors and made any needed referrals.   "

## 2023-10-27 DIAGNOSIS — Z96.642 STATUS POST TOTAL REPLACEMENT OF LEFT HIP: Primary | ICD-10-CM

## 2023-11-10 ENCOUNTER — OFFICE VISIT (OUTPATIENT)
Dept: FAMILY MEDICINE | Facility: CLINIC | Age: 68
End: 2023-11-10
Payer: COMMERCIAL

## 2023-11-10 VITALS
TEMPERATURE: 97.5 F | HEIGHT: 66 IN | WEIGHT: 212 LBS | OXYGEN SATURATION: 96 % | BODY MASS INDEX: 34.07 KG/M2 | RESPIRATION RATE: 16 BRPM | HEART RATE: 78 BPM | SYSTOLIC BLOOD PRESSURE: 110 MMHG | DIASTOLIC BLOOD PRESSURE: 64 MMHG

## 2023-11-10 DIAGNOSIS — I10 BENIGN ESSENTIAL HYPERTENSION: Chronic | ICD-10-CM

## 2023-11-10 DIAGNOSIS — M16.12 PRIMARY OSTEOARTHRITIS OF LEFT HIP: ICD-10-CM

## 2023-11-10 DIAGNOSIS — Z01.818 PRE-OPERATIVE EXAMINATION: Primary | ICD-10-CM

## 2023-11-10 DIAGNOSIS — R73.03 PREDIABETES: ICD-10-CM

## 2023-11-10 DIAGNOSIS — E78.2 MIXED HYPERLIPIDEMIA: Chronic | ICD-10-CM

## 2023-11-10 DIAGNOSIS — Z12.5 SCREENING FOR PROSTATE CANCER: ICD-10-CM

## 2023-11-10 DIAGNOSIS — Z00.00 ENCOUNTER FOR MEDICARE ANNUAL WELLNESS EXAM: ICD-10-CM

## 2023-11-10 LAB
ERYTHROCYTE [DISTWIDTH] IN BLOOD BY AUTOMATED COUNT: 13 % (ref 10–15)
HBA1C MFR BLD: 5.5 % (ref 0–5.6)
HCT VFR BLD AUTO: 41.8 % (ref 40–53)
HGB BLD-MCNC: 14.5 G/DL (ref 13.3–17.7)
MCH RBC QN AUTO: 31.7 PG (ref 26.5–33)
MCHC RBC AUTO-ENTMCNC: 34.7 G/DL (ref 31.5–36.5)
MCV RBC AUTO: 91 FL (ref 78–100)
PLATELET # BLD AUTO: 216 10E3/UL (ref 150–450)
RBC # BLD AUTO: 4.58 10E6/UL (ref 4.4–5.9)
WBC # BLD AUTO: 6.7 10E3/UL (ref 4–11)

## 2023-11-10 PROCEDURE — 99214 OFFICE O/P EST MOD 30 MIN: CPT | Performed by: FAMILY MEDICINE

## 2023-11-10 PROCEDURE — G0103 PSA SCREENING: HCPCS | Performed by: FAMILY MEDICINE

## 2023-11-10 PROCEDURE — 85027 COMPLETE CBC AUTOMATED: CPT | Performed by: FAMILY MEDICINE

## 2023-11-10 PROCEDURE — 80061 LIPID PANEL: CPT | Performed by: FAMILY MEDICINE

## 2023-11-10 PROCEDURE — 36415 COLL VENOUS BLD VENIPUNCTURE: CPT | Performed by: FAMILY MEDICINE

## 2023-11-10 PROCEDURE — 80048 BASIC METABOLIC PNL TOTAL CA: CPT | Performed by: FAMILY MEDICINE

## 2023-11-10 PROCEDURE — 83036 HEMOGLOBIN GLYCOSYLATED A1C: CPT | Performed by: FAMILY MEDICINE

## 2023-11-10 ASSESSMENT — PAIN SCALES - GENERAL: PAINLEVEL: MILD PAIN (2)

## 2023-11-10 NOTE — RESULT ENCOUNTER NOTE
Please inform of results if patient has not viewed in [employer] within 3 business days.    A1c - Your 3 month blood sugar average was 5.5.    CBC Results - Your cell counts were normal.    Please call the clinic with any questions you may have.     Have a great day,    Dr. Garcia

## 2023-11-10 NOTE — PATIENT INSTRUCTIONS
Important Takeaway Points From This Visit:  Hold your lisinopril the night before, or morning of the procedure.  Hold your aspirin, ibuprofen, aleve usually 1 week prior to surgery. These can be blood thinners.  Tylenol (acetaminophen) is ok to take for pain.      As always, please call with any questions or concerns. I look forward to seeing you again soon!    Take care,  Dr. Navarrete    Your current medication list is printed. Please keep this with you - it is helpful to bring this current list to any other medical appointments. It can also be helpful if you ever go to the emergency room or hospital.    If you had lab testing today we will call you with the results. The phone number we will call with your results is # [phone] (home) [phone] (work). If this is not the best number please call our clinic and change the number.    If you need any refills, please call your pharmacy and they will contact us.    If you have any further concerns or wish to schedule another appointment, please call our office at [phone].    If you have a medical emergency, please call [phone].    Thank you for coming to [facility]!

## 2023-11-10 NOTE — PROGRESS NOTES
[facility] [place]  [address], [address]  [place] [address]  Phone: [phone]  Fax: [phone]  Primary Provider: Tata Amador  Pre-op Performing Provider: TATA AMADOR    PREOPERATIVE EVALUATION:  Today's date: 11/10/2023    Aníbal is a 68 year old male who presents for a preoperative evaluation.      11/10/2023     3:41 PM   Additional Questions   Roomed by Judith SUMNER Cma   Accompanied by self         11/10/2023     3:41 PM   Patient Reported Additional Medications   Patient reports taking the following new medications n/a     Surgical Information:  Surgery/Procedure:   Left total hip arthroplasty- FAST TRACK Left     [facility]   Surgeon: Dr. Jac Man   Surgery Date: 11/29/23  Time of Surgery: 7:30am   Where patient plans to recover: At home with family  Fax number for surgical facility: Note does not need to be faxed, will be available electronically in Epic.    Assessment & Plan     The proposed surgical procedure is considered INTERMEDIATE risk.    1. Pre-operative examination  Medically optimized for proposed procedure.  May proceed as planned.  Up-to-date on tetanus.  Chronic conditions controlled.  Able to tolerate 4 METS.   - CBC with platelets  - Basic metabolic panel  (Ca, Cl, CO2, Creat, Gluc, K, Na, BUN)    2. Primary osteoarthritis of left hip  Plan for procedure above.    3. Mixed hyperlipidemia  Continue statin.  - Lipid panel reflex to direct LDL Non-fasting    4. Prediabetes  Resolved. Back in the normal range per A1c.  - Basic metabolic panel  (Ca, Cl, CO2, Creat, Gluc, K, Na, BUN)  - Hemoglobin A1c    5. Benign essential hypertension  Controlled. Hold lisinopril night before/morning of procedure.  - Basic metabolic panel  (Ca, Cl, CO2, Creat, Gluc, K, Na, BUN)    6. Encounter for Medicare annual wellness exam  7. Screening for prostate cancer  Labs collected from visit on 10/18/23.  - Lipid panel reflex to direct LDL  Non-fasting  - CBC with platelets  - Basic metabolic panel  (Ca, Cl, CO2, Creat, Gluc, K, Na, BUN)  - Hemoglobin A1c  - PSA, screen     - No identified additional risk factors other than previously addressed    Antiplatelet or Anticoagulation Medication Instructions:   - aspirin: Discontinue aspirin 7-10 days prior to procedure to reduce bleeding risk. It should be resumed postoperatively.     Additional Medication Instructions:   - ACE/ARB: HOLD on day of surgery (minimum 11 hours for general anesthesia).   - Statins: Continue taking on the day of surgery.    - ibuprofen (Advil, Motrin): HOLD 1 day before surgery.     RECOMMENDATION:  APPROVAL GIVEN to proceed with proposed procedure, without further diagnostic evaluation.    Jaime Navarrete MD  [facility]    Disclaimer: This note consists of symbols derived from keyboarding, dictation and/or voice recognition software. As a result, there may be errors in the script that have gone undetected. Please consider this when interpreting information found in this chart.    Subjective       HPI related to upcoming procedure: Hx of bilateral osteoarthritis. Plan for left hip replacement, likely followed soon after by right.    Hx of pre-diabetes.    Hx of HTN controlled with lisinopril.    Not currently taking aspirin or ibuprofen.    Was given Hibiclens during visit a few weeks ago.    Waiting to do labs today from Medicare visit on 10/18/2023 to avoid multiple blood draws. Will have these all completed today.        11/4/2023     8:09 AM   Preop Questions   1. Have you ever had a heart attack or stroke? No   2. Have you ever had surgery on your heart or blood vessels, such as a stent placement, a coronary artery bypass, or surgery on an artery in your head, neck, heart, or legs? No   3. Do you have chest pain with activity? No   4. Do you have a history of  heart failure? No   5. Do you currently have a cold, bronchitis or symptoms  of other infection? No   6. Do you have a cough, shortness of breath, or wheezing? No   7. Do you or anyone in your family have previous history of blood clots? No   8. Do you or does anyone in your family have a serious bleeding problem such as prolonged bleeding following surgeries or cuts? No   9. Have you ever had problems with anemia or been told to take iron pills? No   10. Have you had any abnormal blood loss such as black, tarry or bloody stools? No   11. Have you ever had a blood transfusion? No   12. Are you willing to have a blood transfusion if it is medically needed before, during, or after your surgery? Yes   13. Have you or any of your relatives ever had problems with anesthesia? No   14. Do you have sleep apnea, excessive snoring or daytime drowsiness? No   15. Do you have any artifical heart valves or other implanted medical devices like a pacemaker, defibrillator, or continuous glucose monitor? No   16. Do you have artificial joints? No   17. Are you allergic to latex? No     Health Care Directive:  Patient does not have a Health Care Directive or Living Will: Sheila Goldstein - Spouse - [phone]    Preoperative Review of :   reviewed - no record of controlled substances prescribed.    Status of Chronic Conditions:  See problem list for active medical problems.  Problems all longstanding and stable, except as noted/documented.  See ROS for pertinent symptoms related to these conditions.    Review of Systems  Constitutional, neuro, ENT, endocrine, pulmonary, cardiac, gastrointestinal, genitourinary, musculoskeletal, integument and psychiatric systems are negative, except as otherwise noted.    Patient Active Problem List    Diagnosis Date Noted    Prediabetes 10/14/2022     Priority: Medium    Mixed hyperlipidemia 10/06/2022     Priority: Medium    Benign essential hypertension 10/06/2022     Priority: Medium    Transient visual loss, right 10/05/2022     Priority: Medium    Infection due to  2019 novel coronavirus 10/03/2022     Priority: Medium    Cervicalgia      Priority: Medium      Past Medical History:   Diagnosis Date    Cervicalgia 01/2008    Hypertension 10-6-22    Pain in joint, shoulder region 01/2008     Past Surgical History:   Procedure Laterality Date    ABDOMEN SURGERY  1991    Hernia repair    COLONOSCOPY      COLONOSCOPY WITH CO2 INSUFFLATION N/A 05/17/2021    Procedure: COLONOSCOPY, WITH CO2 INSUFFLATION;  Surgeon: Elder Bates DO;  Location: [facility] OR    HC KNEE SCOPE,MED/LAT MENISECTOMY  02/09/2009    Right knee    HERNIA REPAIR, INGUINAL RT/LT      lt ?    RELEASE CARPAL TUNNEL Right 03/01/2018    Procedure: RELEASE CARPAL TUNNEL;  Right carpal tunnel release;  Surgeon: Britney Delgado MD;  Location: [facility] OR    ROTATOR CUFF REPAIR RT/LT  02/18/2010    Right side. Done at [facility].     Current Outpatient Medications   Medication Sig Dispense Refill    acetaminophen (TYLENOL) 500 MG tablet Take 500-1,000 mg by mouth every 6 hours as needed for mild pain      aspirin (ASA) 325 MG EC tablet Take 1 tablet (325 mg) by mouth daily (Patient not taking: Reported on 7/25/2023) 90 tablet 3    atorvastatin (LIPITOR) 40 MG tablet Take 1 tablet (40 mg) by mouth every evening 90 tablet 3    ibuprofen (ADVIL/MOTRIN) 200 MG tablet Take 400 mg by mouth every 4 hours as needed for pain      lisinopril (ZESTRIL) 10 MG tablet Take 0.5 tablets (5 mg) by mouth daily 90 tablet 3       Allergies   Allergen Reactions    No Known Allergies         Social History     Tobacco Use    Smoking status: Never    Smokeless tobacco: Never   Substance Use Topics    Alcohol use: Not Currently     Alcohol/week: 1.7 standard drinks of alcohol     Comment: 0-1 per week      Family History   Problem Relation Age of Onset    Diabetes Father         Adult onset    Alzheimer Disease Father     Alzheimer Disease Mother     Colon Cancer Mother     Colon Cancer Maternal Grandmother     Prostate Cancer No  "family hx of     Breast Cancer No family hx of     Ulcerative Colitis No family hx of     Crohn's Disease No family hx of      History   Drug Use Unknown         Objective     /64   Pulse 78   Temp 97.5  F (36.4  C) (Temporal)   Resp 16   Ht 1.672 m (5' 5.83\")   Wt 96.2 kg (212 lb)   SpO2 96%   BMI 34.39 kg/m      Physical Exam    GENERAL APPEARANCE: Obesity. Healthy, alert and no distress     EYES: EOMI,  PERRL     HENT: ear canals and TM's normal and nose and mouth without ulcers or lesions     NECK: no adenopathy, no asymmetry, masses, or scars and thyroid normal to palpation     RESP: lungs clear to auscultation - no rales, rhonchi or wheezes     CV: regular rates and rhythm, normal S1 S2, no S3 or S4 and no murmur, click or rub     ABDOMEN:  soft, nontender, no HSM or masses and bowel sounds normal     MS: extremities normal- no gross deformities noted, no evidence of inflammation in joints, FROM in all extremities.     SKIN: no suspicious lesions or rashes     NEURO: Normal strength and tone, sensory exam grossly normal, mentation intact and speech normal     PSYCH: mentation appears normal. and affect normal/bright     LYMPHATICS: No cervical adenopathy    Recent Labs   Lab Test 10/06/22  0622 10/05/22  2129 10/04/22  1627   HGB 14.6  --  15.0     --  211   INR  --   --  0.99     --  137   POTASSIUM 3.9  --  3.9   CR 0.79  --  0.77   A1C  --  5.9*  --       Diagnostics:  Recent Results (from the past 48 hour(s))   CBC with platelets    Collection Time: 11/10/23  4:39 PM   Result Value Ref Range    WBC Count 6.7 4.0 - 11.0 10e3/uL    RBC Count 4.58 4.40 - 5.90 10e6/uL    Hemoglobin 14.5 13.3 - 17.7 g/dL    Hematocrit 41.8 40.0 - 53.0 %    MCV 91 78 - 100 fL    MCH 31.7 26.5 - 33.0 pg    MCHC 34.7 31.5 - 36.5 g/dL    RDW 13.0 10.0 - 15.0 %    Platelet Count 216 150 - 450 10e3/uL   Hemoglobin A1c    Collection Time: 11/10/23  4:39 PM   Result Value Ref Range    Hemoglobin A1C 5.5 0.0 - " 5.6 %      No EKG required, no history of coronary heart disease, significant arrhythmia, peripheral arterial disease or other structural heart disease.    Revised Cardiac Risk Index (RCRI):  The patient has the following serious cardiovascular risks for perioperative complications:   - No serious cardiac risks = 0 points     RCRI Interpretation: 0 points: Class I (very low risk - 0.4% complication rate)       Signed Electronically by: Jaime Navarrete MD  Copy of this evaluation report is provided to requesting physician.

## 2023-11-13 ENCOUNTER — TELEPHONE (OUTPATIENT)
Dept: ORTHOPEDICS | Facility: CLINIC | Age: 68
End: 2023-11-13
Payer: COMMERCIAL

## 2023-11-13 LAB
ANION GAP SERPL CALCULATED.3IONS-SCNC: 13 MMOL/L (ref 7–15)
BUN SERPL-MCNC: 19.6 MG/DL (ref 8–23)
CALCIUM SERPL-MCNC: 8.7 MG/DL (ref 8.8–10.2)
CHLORIDE SERPL-SCNC: 104 MMOL/L (ref 98–107)
CHOLEST SERPL-MCNC: 157 MG/DL
CREAT SERPL-MCNC: 0.82 MG/DL (ref 0.67–1.17)
DEPRECATED HCO3 PLAS-SCNC: 21 MMOL/L (ref 22–29)
EGFRCR SERPLBLD CKD-EPI 2021: >90 ML/MIN/1.73M2
GLUCOSE SERPL-MCNC: 77 MG/DL (ref 70–99)
HDLC SERPL-MCNC: 60 MG/DL
LDLC SERPL CALC-MCNC: 75 MG/DL
NONHDLC SERPL-MCNC: 97 MG/DL
POTASSIUM SERPL-SCNC: 4 MMOL/L (ref 3.4–5.3)
PSA SERPL DL<=0.01 NG/ML-MCNC: 0.84 NG/ML (ref 0–4.5)
SODIUM SERPL-SCNC: 138 MMOL/L (ref 135–145)
TRIGL SERPL-MCNC: 112 MG/DL

## 2023-11-13 NOTE — TELEPHONE ENCOUNTER
Hello,    I'm with ortho con.  Pt has surgery coming up with Dr. Man and has question for the team.  He is curious about how soon he can schedule his second hip after the first.  He wants to get going on this.  Please give him a call.

## 2023-11-13 NOTE — RESULT ENCOUNTER NOTE
Please inform of results if patient has not viewed in [facility] within 3 business days.    BMP - Your blood sugar was 77. Your kidney function and electrolytes were normal.    Lipids - Your cholesterol lab results were normal. Continue your cholesterol medication.    PSA - Your Prostate cancer screening lab results were normal.    Please call the clinic with any questions you may have.     Have a great day,    Dr. Garcia

## 2023-11-15 NOTE — TELEPHONE ENCOUNTER
Called and let Pt know that we will not be able to schedule until at least 6 weeks past the initial surgery. Pt was agreeable to this plan and will bring it up again at their post op visits.

## 2023-11-16 ENCOUNTER — TELEPHONE (OUTPATIENT)
Dept: FAMILY MEDICINE | Facility: CLINIC | Age: 68
End: 2023-11-16
Payer: COMMERCIAL

## 2023-11-16 NOTE — TELEPHONE ENCOUNTER
Call with results    Evert Spangler,     Here are your lab results:     BMP - Your blood sugar was 77. Your kidney function and electrolytes were normal.     Lipids - Your cholesterol lab results were normal. Continue your cholesterol medication.     PSA - Your Prostate cancer screening lab results were normal.     Please call the clinic with any questions you may have.     Have a great day,     Dr. Radha Jorgensen, MSN, RN, PHN  [facility] [place]  November 16, 2023

## 2023-11-16 NOTE — TELEPHONE ENCOUNTER
Patient read results on my chart 11/14/23 at 8:36 am.     Closing encounter.     Mary Lou BUSCHN, RN  [facility]       no

## 2023-11-20 NOTE — PROGRESS NOTES
Ortho Navigator Note      Pre-op Date 11/10/23 [facility]     Medical Clearance  Cleared     Labs Stable      COVID Test Date No longer indicated      Skin  Intact      Activity: Ambulates independently without assistive device      Equipment Need Patient will bring a walker for discharge. Defer to PT/OT for recs.       Meds to Hold Held all supplements 14 days prior to surgery  Ibuprofen- Hold 48 hr prior to surgery  ASA and Diclofenac- not taking   * Medication recommendations are not intended to be exhaustive; they are limited to common medications that are potentially dangerous if incorrectly managed   NPO Instructions  Instructed to stop solids 8 hr prior to arrival and clears 2 hr prior to arrival.       Pre-op Joint Education Complete? Email link sent to patient    Discharge Plan Patient has plan to discharge home on DOS as Fast Track   Spouse and/or daughter will remain at hospital at during surgery/recovery to participate in therapy and discharge education. They will then transport patient home    /Transportation Spouse and daughters will be coaches and transportation.   is physically able to care for patient.      Barriers to Discharge No barriers to discharge.      Additional Info/   Special Needs : Patient had no unanswered questions or concerns.             11/19/23 1271   Discharge Planning   Patient/Family Anticipates Transition to home   Concerns to be Addressed no discharge needs identified   Living Arrangements   People in Home spouse;child(zechariah), adult   Type of Residence Private Residence   Is your private residence a single family home or apartment? Single family home   Number of Stairs, Within Home, Primary one   Once home, are you able to live on one level? Yes   Bathroom Shower/Tub Tub/Shower unit   Equipment Currently Used at Home none   Support System   Support Systems Spouse/Significant Other   Do you have someone available to stay with you one or two nights once you are home? Yes    Medical Clearance   Date of Physical 11/10/23   Clinic Name [facility]   It is recommended that you call and check with any specialty providers before surgery to see if you need surgical clearance.  Do you see any specialty providers outside of your primary care provider? No   Blood   Known Bleeding Disorder or Coagulopathy No   Does the patient have any Confucianism/cultural preferences related to blood products? No   Education   Has the patient scheduled or completed pre-op total joint education, either in class or online, in the last 12 months? Yes   Patient attended total joint pre-op class/received pre-op teaching  online   Relationship/Living Environment   Name(s) of People in Home Sheila (spouse) and daughters

## 2023-11-28 ENCOUNTER — ANESTHESIA EVENT (OUTPATIENT)
Dept: SURGERY | Facility: CLINIC | Age: 68
End: 2023-11-28
Payer: COMMERCIAL

## 2023-11-29 ENCOUNTER — ANESTHESIA (OUTPATIENT)
Dept: SURGERY | Facility: CLINIC | Age: 68
End: 2023-11-29
Payer: COMMERCIAL

## 2023-11-29 ENCOUNTER — APPOINTMENT (OUTPATIENT)
Dept: GENERAL RADIOLOGY | Facility: CLINIC | Age: 68
End: 2023-11-29
Payer: COMMERCIAL

## 2023-11-29 ENCOUNTER — APPOINTMENT (OUTPATIENT)
Dept: OCCUPATIONAL THERAPY | Facility: CLINIC | Age: 68
End: 2023-11-29
Attending: ORTHOPAEDIC SURGERY
Payer: COMMERCIAL

## 2023-11-29 ENCOUNTER — APPOINTMENT (OUTPATIENT)
Dept: PHYSICAL THERAPY | Facility: CLINIC | Age: 68
End: 2023-11-29
Attending: ORTHOPAEDIC SURGERY
Payer: COMMERCIAL

## 2023-11-29 ENCOUNTER — HOSPITAL ENCOUNTER (OUTPATIENT)
Facility: CLINIC | Age: 68
Discharge: HOME | End: 2023-11-29
Attending: ORTHOPAEDIC SURGERY | Admitting: ORTHOPAEDIC SURGERY
Payer: COMMERCIAL

## 2023-11-29 VITALS
HEIGHT: 66 IN | RESPIRATION RATE: 18 BRPM | HEART RATE: 91 BPM | OXYGEN SATURATION: 95 % | WEIGHT: 213.41 LBS | TEMPERATURE: 97.7 F | BODY MASS INDEX: 34.3 KG/M2 | SYSTOLIC BLOOD PRESSURE: 123 MMHG | DIASTOLIC BLOOD PRESSURE: 69 MMHG

## 2023-11-29 DIAGNOSIS — M16.12 PRIMARY OSTEOARTHRITIS OF LEFT HIP: Primary | ICD-10-CM

## 2023-11-29 PROCEDURE — 97165 OT EVAL LOW COMPLEX 30 MIN: CPT | Mod: GO

## 2023-11-29 PROCEDURE — 258N000003 HC RX IP 258 OP 636: Performed by: NURSE ANESTHETIST, CERTIFIED REGISTERED

## 2023-11-29 PROCEDURE — 97530 THERAPEUTIC ACTIVITIES: CPT | Mod: GP

## 2023-11-29 PROCEDURE — 250N000013 HC RX MED GY IP 250 OP 250 PS 637: Performed by: ANESTHESIOLOGY

## 2023-11-29 PROCEDURE — 272N000001 HC OR GENERAL SUPPLY STERILE: Performed by: ORTHOPAEDIC SURGERY

## 2023-11-29 PROCEDURE — 250N000011 HC RX IP 250 OP 636

## 2023-11-29 PROCEDURE — 250N000011 HC RX IP 250 OP 636: Mod: JZ | Performed by: ANESTHESIOLOGY

## 2023-11-29 PROCEDURE — 999N000141 HC STATISTIC PRE-PROCEDURE NURSING ASSESSMENT: Performed by: ORTHOPAEDIC SURGERY

## 2023-11-29 PROCEDURE — 710N000012 HC RECOVERY PHASE 2, PER MINUTE: Performed by: ORTHOPAEDIC SURGERY

## 2023-11-29 PROCEDURE — 710N000010 HC RECOVERY PHASE 1, LEVEL 2, PER MIN: Performed by: ORTHOPAEDIC SURGERY

## 2023-11-29 PROCEDURE — C1713 ANCHOR/SCREW BN/BN,TIS/BN: HCPCS | Performed by: ORTHOPAEDIC SURGERY

## 2023-11-29 PROCEDURE — 97110 THERAPEUTIC EXERCISES: CPT | Mod: GP

## 2023-11-29 PROCEDURE — 250N000013 HC RX MED GY IP 250 OP 250 PS 637

## 2023-11-29 PROCEDURE — 370N000017 HC ANESTHESIA TECHNICAL FEE, PER MIN: Performed by: ORTHOPAEDIC SURGERY

## 2023-11-29 PROCEDURE — 250N000011 HC RX IP 250 OP 636: Performed by: NURSE ANESTHETIST, CERTIFIED REGISTERED

## 2023-11-29 PROCEDURE — 250N000011 HC RX IP 250 OP 636: Mod: JZ | Performed by: NURSE ANESTHETIST, CERTIFIED REGISTERED

## 2023-11-29 PROCEDURE — 97535 SELF CARE MNGMENT TRAINING: CPT | Mod: GO

## 2023-11-29 PROCEDURE — 97116 GAIT TRAINING THERAPY: CPT | Mod: GP

## 2023-11-29 PROCEDURE — 97161 PT EVAL LOW COMPLEX 20 MIN: CPT | Mod: GP

## 2023-11-29 PROCEDURE — 999N000065 XR PELVIS AND HIP PORTABLE LEFT 1 VIEW

## 2023-11-29 PROCEDURE — C1776 JOINT DEVICE (IMPLANTABLE): HCPCS | Performed by: ORTHOPAEDIC SURGERY

## 2023-11-29 PROCEDURE — 258N000003 HC RX IP 258 OP 636

## 2023-11-29 PROCEDURE — 360N000077 HC SURGERY LEVEL 4, PER MIN: Performed by: ORTHOPAEDIC SURGERY

## 2023-11-29 DEVICE — REFLECTION SPHERICAL HEAD SCREW 20MM
Type: IMPLANTABLE DEVICE | Site: HIP | Status: FUNCTIONAL
Brand: REFLECTION

## 2023-11-29 DEVICE — SYNERGY POROUS HIGH OFFSET FEMORAL                                    SIZE 12
Type: IMPLANTABLE DEVICE | Site: HIP | Status: FUNCTIONAL
Brand: SYNERGY

## 2023-11-29 DEVICE — COBALT CHROME 12/14 TAPER FEMORAL                                    HEAD 32MM + 0: Type: IMPLANTABLE DEVICE | Site: HIP | Status: FUNCTIONAL

## 2023-11-29 DEVICE — R3 3 HOLE ACETABULAR SHELL 50MM
Type: IMPLANTABLE DEVICE | Site: HIP | Status: FUNCTIONAL
Brand: R3 ACETABULAR

## 2023-11-29 DEVICE — R3 0 DEGREE XLPE ACETABULAR LINER                                    32MM ID X OD 50MM
Type: IMPLANTABLE DEVICE | Site: HIP | Status: FUNCTIONAL
Brand: R3

## 2023-11-29 DEVICE — REFLECTION SPHERICAL HEAD SCREW 40MM
Type: IMPLANTABLE DEVICE | Site: HIP | Status: FUNCTIONAL
Brand: REFLECTION

## 2023-11-29 RX ORDER — ACETAMINOPHEN 325 MG/1
650 TABLET ORAL EVERY 4 HOURS PRN
Qty: 100 TABLET | Refills: 0 | Status: SHIPPED | OUTPATIENT
Start: 2023-11-29 | End: 2023-12-12

## 2023-11-29 RX ORDER — ACETAMINOPHEN 325 MG/1
650 TABLET ORAL EVERY 4 HOURS PRN
Status: DISCONTINUED | OUTPATIENT
Start: 2023-12-02 | End: 2023-11-29 | Stop reason: HOSPADM

## 2023-11-29 RX ORDER — ONDANSETRON 2 MG/ML
4 INJECTION INTRAMUSCULAR; INTRAVENOUS EVERY 30 MIN PRN
Status: DISCONTINUED | OUTPATIENT
Start: 2023-11-29 | End: 2023-11-29 | Stop reason: HOSPADM

## 2023-11-29 RX ORDER — ONDANSETRON 2 MG/ML
4 INJECTION INTRAMUSCULAR; INTRAVENOUS EVERY 6 HOURS PRN
Status: CANCELLED | OUTPATIENT
Start: 2023-11-29

## 2023-11-29 RX ORDER — HYDROMORPHONE HYDROCHLORIDE 1 MG/ML
0.4 INJECTION, SOLUTION INTRAMUSCULAR; INTRAVENOUS; SUBCUTANEOUS
Status: CANCELLED | OUTPATIENT
Start: 2023-11-29

## 2023-11-29 RX ORDER — HYDROMORPHONE HYDROCHLORIDE 1 MG/ML
0.4 INJECTION, SOLUTION INTRAMUSCULAR; INTRAVENOUS; SUBCUTANEOUS EVERY 5 MIN PRN
Status: DISCONTINUED | OUTPATIENT
Start: 2023-11-29 | End: 2023-11-29 | Stop reason: HOSPADM

## 2023-11-29 RX ORDER — CEFAZOLIN SODIUM/WATER 2 G/20 ML
2 SYRINGE (ML) INTRAVENOUS SEE ADMIN INSTRUCTIONS
Status: DISCONTINUED | OUTPATIENT
Start: 2023-11-29 | End: 2023-11-29 | Stop reason: HOSPADM

## 2023-11-29 RX ORDER — ASPIRIN 325 MG
325 TABLET, DELAYED RELEASE (ENTERIC COATED) ORAL DAILY
Qty: 30 TABLET | Refills: 0 | Status: SHIPPED | OUTPATIENT
Start: 2023-11-29 | End: 2024-01-17

## 2023-11-29 RX ORDER — ONDANSETRON 4 MG/1
4 TABLET, ORALLY DISINTEGRATING ORAL EVERY 30 MIN PRN
Status: DISCONTINUED | OUTPATIENT
Start: 2023-11-29 | End: 2023-11-29 | Stop reason: HOSPADM

## 2023-11-29 RX ORDER — FENTANYL CITRATE 50 UG/ML
50 INJECTION, SOLUTION INTRAMUSCULAR; INTRAVENOUS EVERY 5 MIN PRN
Status: DISCONTINUED | OUTPATIENT
Start: 2023-11-29 | End: 2023-11-29 | Stop reason: HOSPADM

## 2023-11-29 RX ORDER — HYDROMORPHONE HYDROCHLORIDE 1 MG/ML
0.2 INJECTION, SOLUTION INTRAMUSCULAR; INTRAVENOUS; SUBCUTANEOUS
Status: CANCELLED | OUTPATIENT
Start: 2023-11-29

## 2023-11-29 RX ORDER — BUPIVACAINE HYDROCHLORIDE 7.5 MG/ML
INJECTION, SOLUTION INTRASPINAL
Status: COMPLETED | OUTPATIENT
Start: 2023-11-29 | End: 2023-11-29

## 2023-11-29 RX ORDER — OXYCODONE HYDROCHLORIDE 5 MG/1
5 TABLET ORAL
Status: COMPLETED | OUTPATIENT
Start: 2023-11-29 | End: 2023-11-29

## 2023-11-29 RX ORDER — OXYCODONE HYDROCHLORIDE 10 MG/1
10 TABLET ORAL
Status: DISCONTINUED | OUTPATIENT
Start: 2023-11-29 | End: 2023-11-29 | Stop reason: HOSPADM

## 2023-11-29 RX ORDER — FENTANYL CITRATE 50 UG/ML
INJECTION, SOLUTION INTRAMUSCULAR; INTRAVENOUS PRN
Status: DISCONTINUED | OUTPATIENT
Start: 2023-11-29 | End: 2023-11-29

## 2023-11-29 RX ORDER — ONDANSETRON 2 MG/ML
INJECTION INTRAMUSCULAR; INTRAVENOUS PRN
Status: DISCONTINUED | OUTPATIENT
Start: 2023-11-29 | End: 2023-11-29

## 2023-11-29 RX ORDER — DEXAMETHASONE SODIUM PHOSPHATE 4 MG/ML
INJECTION, SOLUTION INTRA-ARTICULAR; INTRALESIONAL; INTRAMUSCULAR; INTRAVENOUS; SOFT TISSUE PRN
Status: DISCONTINUED | OUTPATIENT
Start: 2023-11-29 | End: 2023-11-29

## 2023-11-29 RX ORDER — SODIUM CHLORIDE, SODIUM LACTATE, POTASSIUM CHLORIDE, CALCIUM CHLORIDE 600; 310; 30; 20 MG/100ML; MG/100ML; MG/100ML; MG/100ML
INJECTION, SOLUTION INTRAVENOUS CONTINUOUS
Status: CANCELLED | OUTPATIENT
Start: 2023-11-29

## 2023-11-29 RX ORDER — POLYETHYLENE GLYCOL 3350 17 G/17G
17 POWDER, FOR SOLUTION ORAL DAILY
Status: CANCELLED | OUTPATIENT
Start: 2023-11-30

## 2023-11-29 RX ORDER — OXYCODONE HYDROCHLORIDE 5 MG/1
5-10 TABLET ORAL EVERY 4 HOURS PRN
Qty: 26 TABLET | Refills: 0 | Status: SHIPPED | OUTPATIENT
Start: 2023-11-29 | End: 2024-01-17

## 2023-11-29 RX ORDER — ONDANSETRON 4 MG/1
4 TABLET, ORALLY DISINTEGRATING ORAL EVERY 6 HOURS PRN
Status: CANCELLED | OUTPATIENT
Start: 2023-11-29

## 2023-11-29 RX ORDER — HYDROMORPHONE HYDROCHLORIDE 1 MG/ML
0.2 INJECTION, SOLUTION INTRAMUSCULAR; INTRAVENOUS; SUBCUTANEOUS EVERY 5 MIN PRN
Status: DISCONTINUED | OUTPATIENT
Start: 2023-11-29 | End: 2023-11-29 | Stop reason: HOSPADM

## 2023-11-29 RX ORDER — PROCHLORPERAZINE MALEATE 5 MG
5 TABLET ORAL EVERY 6 HOURS PRN
Status: CANCELLED | OUTPATIENT
Start: 2023-11-29

## 2023-11-29 RX ORDER — ACETAMINOPHEN 325 MG/1
975 TABLET ORAL EVERY 8 HOURS
Status: DISCONTINUED | OUTPATIENT
Start: 2023-11-29 | End: 2023-11-29 | Stop reason: HOSPADM

## 2023-11-29 RX ORDER — SODIUM CHLORIDE, SODIUM LACTATE, POTASSIUM CHLORIDE, CALCIUM CHLORIDE 600; 310; 30; 20 MG/100ML; MG/100ML; MG/100ML; MG/100ML
INJECTION, SOLUTION INTRAVENOUS CONTINUOUS PRN
Status: DISCONTINUED | OUTPATIENT
Start: 2023-11-29 | End: 2023-11-29

## 2023-11-29 RX ORDER — ASPIRIN 325 MG
325 TABLET, DELAYED RELEASE (ENTERIC COATED) ORAL DAILY
Status: CANCELLED | OUTPATIENT
Start: 2023-11-29

## 2023-11-29 RX ORDER — PHENYLEPHRINE HCL IN 0.9% NACL 50MG/250ML
.5-1.25 PLASTIC BAG, INJECTION (ML) INTRAVENOUS CONTINUOUS
Status: DISCONTINUED | OUTPATIENT
Start: 2023-11-29 | End: 2023-11-29 | Stop reason: ALTCHOICE

## 2023-11-29 RX ORDER — POLYETHYLENE GLYCOL 3350 17 G/17G
1 POWDER, FOR SOLUTION ORAL DAILY
Qty: 7 PACKET | Refills: 0 | Status: SHIPPED | OUTPATIENT
Start: 2023-11-29 | End: 2023-12-12

## 2023-11-29 RX ORDER — AMOXICILLIN 250 MG
1 CAPSULE ORAL 2 TIMES DAILY
Status: CANCELLED | OUTPATIENT
Start: 2023-11-29

## 2023-11-29 RX ORDER — CEFAZOLIN SODIUM/WATER 2 G/20 ML
2 SYRINGE (ML) INTRAVENOUS
Status: COMPLETED | OUTPATIENT
Start: 2023-11-29 | End: 2023-11-29

## 2023-11-29 RX ORDER — AMOXICILLIN 250 MG
1-2 CAPSULE ORAL 2 TIMES DAILY
Qty: 30 TABLET | Refills: 0 | Status: SHIPPED | OUTPATIENT
Start: 2023-11-29 | End: 2023-12-12

## 2023-11-29 RX ORDER — OXYCODONE HYDROCHLORIDE 10 MG/1
10 TABLET ORAL EVERY 4 HOURS PRN
Status: DISCONTINUED | OUTPATIENT
Start: 2023-11-29 | End: 2023-11-29 | Stop reason: HOSPADM

## 2023-11-29 RX ORDER — PROPOFOL 10 MG/ML
INJECTION, EMULSION INTRAVENOUS CONTINUOUS PRN
Status: DISCONTINUED | OUTPATIENT
Start: 2023-11-29 | End: 2023-11-29

## 2023-11-29 RX ORDER — LIDOCAINE 40 MG/G
CREAM TOPICAL
Status: CANCELLED | OUTPATIENT
Start: 2023-11-29

## 2023-11-29 RX ORDER — TRANEXAMIC ACID 650 MG/1
1950 TABLET ORAL ONCE
Status: COMPLETED | OUTPATIENT
Start: 2023-11-29 | End: 2023-11-29

## 2023-11-29 RX ORDER — BISACODYL 10 MG
10 SUPPOSITORY, RECTAL RECTAL DAILY PRN
Status: CANCELLED | OUTPATIENT
Start: 2023-11-29

## 2023-11-29 RX ORDER — SODIUM CHLORIDE, SODIUM LACTATE, POTASSIUM CHLORIDE, CALCIUM CHLORIDE 600; 310; 30; 20 MG/100ML; MG/100ML; MG/100ML; MG/100ML
INJECTION, SOLUTION INTRAVENOUS CONTINUOUS
Status: DISCONTINUED | OUTPATIENT
Start: 2023-11-29 | End: 2023-11-29 | Stop reason: HOSPADM

## 2023-11-29 RX ORDER — ACETAMINOPHEN 325 MG/1
975 TABLET ORAL ONCE
Status: COMPLETED | OUTPATIENT
Start: 2023-11-29 | End: 2023-11-29

## 2023-11-29 RX ORDER — CEFAZOLIN SODIUM/WATER 2 G/20 ML
2 SYRINGE (ML) INTRAVENOUS EVERY 8 HOURS
Qty: 40 ML | Refills: 0 | Status: DISCONTINUED | OUTPATIENT
Start: 2023-11-29 | End: 2023-11-29 | Stop reason: HOSPADM

## 2023-11-29 RX ORDER — FENTANYL CITRATE 50 UG/ML
25 INJECTION, SOLUTION INTRAMUSCULAR; INTRAVENOUS EVERY 5 MIN PRN
Status: DISCONTINUED | OUTPATIENT
Start: 2023-11-29 | End: 2023-11-29 | Stop reason: HOSPADM

## 2023-11-29 RX ORDER — OXYCODONE HYDROCHLORIDE 5 MG/1
5 TABLET ORAL EVERY 4 HOURS PRN
Status: DISCONTINUED | OUTPATIENT
Start: 2023-11-29 | End: 2023-11-29 | Stop reason: HOSPADM

## 2023-11-29 RX ADMIN — FENTANYL CITRATE 25 MCG: 50 INJECTION INTRAMUSCULAR; INTRAVENOUS at 07:32

## 2023-11-29 RX ADMIN — PHENYLEPHRINE HYDROCHLORIDE 0.8 MCG/KG/MIN: 10 INJECTION INTRAVENOUS at 08:35

## 2023-11-29 RX ADMIN — BUPIVACAINE HYDROCHLORIDE IN DEXTROSE 1.4 ML: 7.5 INJECTION, SOLUTION SUBARACHNOID at 07:40

## 2023-11-29 RX ADMIN — SODIUM CHLORIDE, POTASSIUM CHLORIDE, SODIUM LACTATE AND CALCIUM CHLORIDE: 600; 310; 30; 20 INJECTION, SOLUTION INTRAVENOUS at 07:28

## 2023-11-29 RX ADMIN — OXYCODONE HYDROCHLORIDE 5 MG: 5 TABLET ORAL at 13:42

## 2023-11-29 RX ADMIN — FENTANYL CITRATE 25 MCG: 50 INJECTION INTRAMUSCULAR; INTRAVENOUS at 09:25

## 2023-11-29 RX ADMIN — Medication 2 G: at 14:03

## 2023-11-29 RX ADMIN — MIDAZOLAM 2 MG: 1 INJECTION INTRAMUSCULAR; INTRAVENOUS at 07:28

## 2023-11-29 RX ADMIN — PROPOFOL 75 MCG/KG/MIN: 10 INJECTION, EMULSION INTRAVENOUS at 07:43

## 2023-11-29 RX ADMIN — PHENYLEPHRINE HYDROCHLORIDE 100 MCG: 10 INJECTION INTRAVENOUS at 07:52

## 2023-11-29 RX ADMIN — FENTANYL CITRATE 50 MCG: 50 INJECTION INTRAMUSCULAR; INTRAVENOUS at 09:35

## 2023-11-29 RX ADMIN — ACETAMINOPHEN 975 MG: 325 TABLET, FILM COATED ORAL at 06:23

## 2023-11-29 RX ADMIN — DEXAMETHASONE SODIUM PHOSPHATE 10 MG: 4 INJECTION, SOLUTION INTRA-ARTICULAR; INTRALESIONAL; INTRAMUSCULAR; INTRAVENOUS; SOFT TISSUE at 08:00

## 2023-11-29 RX ADMIN — Medication 2 G: at 07:41

## 2023-11-29 RX ADMIN — HYDROMORPHONE HYDROCHLORIDE 0.25 MG: 1 INJECTION, SOLUTION INTRAMUSCULAR; INTRAVENOUS; SUBCUTANEOUS at 09:19

## 2023-11-29 RX ADMIN — PHENYLEPHRINE HYDROCHLORIDE 0.5 MCG/KG/MIN: 10 INJECTION INTRAVENOUS at 07:45

## 2023-11-29 RX ADMIN — PHENYLEPHRINE HYDROCHLORIDE 100 MCG: 10 INJECTION INTRAVENOUS at 08:00

## 2023-11-29 RX ADMIN — TRANEXAMIC ACID 1950 MG: 650 TABLET ORAL at 06:22

## 2023-11-29 RX ADMIN — ONDANSETRON 4 MG: 2 INJECTION INTRAMUSCULAR; INTRAVENOUS at 09:16

## 2023-11-29 RX ADMIN — HYDROMORPHONE HYDROCHLORIDE 0.25 MG: 1 INJECTION, SOLUTION INTRAMUSCULAR; INTRAVENOUS; SUBCUTANEOUS at 09:17

## 2023-11-29 RX ADMIN — ACETAMINOPHEN 975 MG: 325 TABLET, FILM COATED ORAL at 13:42

## 2023-11-29 RX ADMIN — HYDROMORPHONE HYDROCHLORIDE 0.5 MG: 1 INJECTION, SOLUTION INTRAMUSCULAR; INTRAVENOUS; SUBCUTANEOUS at 09:40

## 2023-11-29 ASSESSMENT — ACTIVITIES OF DAILY LIVING (ADL)
ADLS_ACUITY_SCORE: 30
SITTING FOR 15 MINUTES: MODERATE DIFFICULTY
WALKING_DOWN_STEEP_HILLS: UNABLE TO DO
STANDING FOR 15 MINUTES: EXTREME DIFFICULTY
ADLS_ACUITY_SCORE: 30
HOS_ADL_HIGHEST_POTENTIAL_SCORE: 64
HEAVY_WORK: UNABLE TO DO
HOW_WOULD_YOU_RATE_YOUR_CURRENT_LEVEL_OF_FUNCTION_DURING_YOUR_USUAL_ACTIVITIES_OF_DAILY_LIVING_FROM_0_TO_100_WITH_100_BEING_YOUR_LEVEL_OF_FUNCTION_PRIOR_TO_YOUR_HIP_PROBLEM_AND_0_BEING_THE_INABILITY_TO_PERFORM_ANY_OF_YOUR_USUAL_DAILY_ACTIVITIES?: 10
GOING UP 1 FLIGHT OF STAIRS: UNABLE TO DO
LIGHT_TO_MODERATE_WORK: EXTREME DIFFICULTY
GOING DOWN 1 FLIGHT OF STAIRS: UNABLE TO DO
RECREATIONAL ACTIVITIES: UNABLE TO DO
WALKING_APPROXIMATELY_10_MINUTES: UNABLE TO DO
PUTTING ON SOCKS AND SHOES: UNABLE TO DO
ADLS_ACUITY_SCORE: 29
STEPPING UP AND DOWN CURBS: EXTREME DIFFICULTY
WALKING_UP_STEEP_HILLS: UNABLE TO DO
ADLS_ACUITY_SCORE: 30
HOS_ADL_ITEM_SCORE_TOTAL: 6
ADLS_ACUITY_SCORE: 30
HOS_ADL_SCORE(%): 9.38
GETTING INTO AND OUT OF AN AVERAGE CAR: EXTREME DIFFICULTY
WALKING_INITIALLY: EXTREME DIFFICULTY
GETTING_INTO_AND_OUT_OF_A_BATHTUB: EXTREME DIFFICULTY
DEEP SQUATTING: UNABLE TO DO
ROLLING OVER IN BED: UNABLE TO DO
WALKING_15_MINUTES_OR_GREATER: UNABLE TO DO

## 2023-11-29 NOTE — ANESTHESIA POSTPROCEDURE EVALUATION
Patient: Aníbal Goldstein    Procedure: Procedure(s):  Left total hip arthroplasty- FAST TRACK       Anesthesia Type:  Spinal    Note:  Disposition: Outpatient   Postop Pain Control: Uneventful            Sign Out: Well controlled pain   PONV: No   Neuro/Psych: Uneventful            Sign Out: Acceptable/Baseline neuro status   Airway/Respiratory: Uneventful            Sign Out: Acceptable/Baseline resp. status   CV/Hemodynamics: Uneventful            Sign Out: Acceptable CV status; No obvious hypovolemia; No obvious fluid overload   Other NRE: NONE   DID A NON-ROUTINE EVENT OCCUR? No       Last vitals:  Vitals Value Taken Time   /72 11/29/23 1100   Temp 36.7  C (98.1  F) 11/29/23 0955   Pulse 75 11/29/23 1100   Resp 16 11/29/23 1100   SpO2 96 % 11/29/23 1100       Electronically Signed By: Wood Nina MD  November 29, 2023  11:42 AM

## 2023-11-29 NOTE — INTERVAL H&P NOTE
"I have reviewed the surgical (or preoperative) H&P that is linked to this encounter, and examined the patient. There are no significant changes    Clinical Conditions Present on Arrival:  Clinically Significant Risk Factors Present on Admission                 # Drug Induced Platelet Defect: home medication list includes an antiplatelet medication  # Obesity: Estimated body mass index is 34.63 kg/m  as calculated from the following:    Height as of this encounter: 1.672 m (5' 5.83\").    Weight as of this encounter: 96.8 kg (213 lb 6.5 oz).       "

## 2023-11-29 NOTE — PLAN OF CARE
Occupational Therapy Discharge Summary    Reason for therapy discharge:    Discharged to home with outpatient therapy.    Progress towards therapy goal(s). See goals on Care Plan in [facility] electronic health record for goal details.  Goals met    Therapy recommendation(s):    Continued therapy is recommended.  Rationale/Recommendations:  recommend follow up with OP PT to progress functional strength, endurance, safety with mobility and ADL completion.

## 2023-11-29 NOTE — OP NOTE
OPERATIVE REPORT    DATE OF SERVICE: 11/29/23    SURGEON: Jac Man MD.    ASSISTANT(S): Katheryn Solomon PA-C who was required because there was not a resident available for the case and who was involved as the fist assist from positioning through closure.     PREOPERATIVE DIAGNOSIS:  Osteoarthritis    POSTOPERATIVE DIAGNOSIS:  Osteoarthritis    OPERATION PERFORMED:  left  total hip arthroplasty    IMPLANTS:    Implant Name Type Inv. Item Serial No.  Lot No. LRB No. Used Action   IMP LINER SNR ACET R3 XLPE 0DEG [id number] - [id number] Total Joint Component/Insert IMP LINER SNR ACET R3 XLPE 0DEG [id number]  [employer] [id number] Left 1 Implanted   IMP SHELL SNR ACET R3 3H 50MM [id number] - [id number] Total Joint Component/Insert IMP SHELL SNR ACET R3 3H 50MM [id number]  [employer] [id number] Left 1 Implanted   IMP SCR ACET SNN SPHERICAL HEAD 6.5X20MM [id number] - [id number] Metallic Hardware/Arley IMP SCR ACET SNN SPHERICAL HEAD 6.5X20MM [id number]  [employer] [id number] Left 1 Implanted   IMP SCR ACET SNN SPHERICAL HEAD 6.5X40MM [id number] - [id number] Metallic Hardware/Arley IMP SCR ACET SNN SPHERICAL HEAD 6.5X40MM [id number]  [employer] [id number] Left 1 Implanted   IMP STEM FEM HIP SNN SYNERGY POROUS SZ 12 HO [id number] - [id number] Total Joint Component/Insert IMP STEM FEM HIP SNN SYNERGY POROUS SZ 12 HO [id number]  [employer] [id number] Left 1 Implanted   IMP HEAD FEMORAL SNR [place] 32MM +0 [id number] - [id number] Total Joint Component/Insert IMP HEAD FEMORAL SNR [place] 32MM +0 [id number]  [employer] [id number] Left 1 Implanted       ANESTHETIC: spinal    OPERATIVE FINDINGS:  End stage arthrosis of the hip    BLOOD LOSS: about 200 ml     COMPLICATIONS:  None apparent    OPERATIVE INDICATIONS:  The patient has a long history of debilitating pain secondary to ostearthritis of the hip.  Despite comprehensive non-operative management these symptoms  continued to interfere with activities of daily living.  After discussion of further treatment options including the risks and benefits that patient elected to proceed with a total hip.    DESCRIPTION OF THE PROCEDURE:  The patient was identified in the preoperative holding area.  The consent form including the risks and benefits were reviewed with the patient.  The operative limb was identified and marked.  The patient was brought back to the operating room and placed supine on the operating table.  An anesthetic was induced by the anesthesia team.   The patient was placed in the lateral decubitus position and prepped and draped in the normal standard fashion for a hip replacement.  A time-out was called.  Antibiotics were given.  We utilized an approximately 15 cm curvilinear incision, centered on the vastus ridge, and performed a standard posterior approach to the hip.  The tensor fascia was split.  A small portion of gluteus scar was split in line with its fibers.  The sciatic nerve was palpated.  The east-west retractor was placed.  The posterior border of gluteus medius was exposed and retracted.  The tendon of piriformis and that of the obturators was released from their attachments.  A trapdoor posterior capsulotomy was performed.  The hip was dislocated.  The lesser trochanter was exposed.  A ruler was used to measure and electrocautery was used to levon our neck cut as preoperatively templated.  The head was measured with a caliper and found to be 47 mm.  This measurement was used to choose our first reamer.  The neck cut was re-measured. The femur was elevated.  A Hohmann was placed over the anterior rim of the acetabulum and the femur was subluxed anterior.  A split was made in the inferior capsule.  The transverse acetabular ligament was left intact and used a guide for the anterversion of the acetabular component.  Circumferential retractors were placed.  We began reaming and went up by two until  "sufficient contact was made with the acetabular rim.  We then went up by one millimeter for a one millimeter press-fit.  We were within one size of our preoperative plan.   A trail was placed.  It had an excellent press fit.  We then placed out final component in 40 degrees of inclination and approximately 20 degrees of anteversion, parallel to the transverse ligament.  The press fit was excellent.  Screws were placed for additional initial fixation.  A flat liner was then placed. It locked into place.  Attention was turned to the femur.  Retractors were placed to elevated the proximal femur and to protect the tendon of gluteus medius.  Remaining lateral neck was removed and the piriformis fossa was cleared of soft-tissue.  A box osteotome and canal finder were used to prepare for broaching.  A sharp broach was used to lateralize slightly.  We then reamed and broached up sequentially to a size 12.  It was rotationally stable and sat up 1-2 millimeters from the neck-cut.  Preoperatively the patient had templated to a high versus standard offset stem.  The high offset stem appropriately tensioned the abductors.  We trialed the following femoral heads: -3 and 0.  The 0 appropriately tensioned the abductors and clinically equalized the leg-lengths.  The stability exam was excellent.  The hip was stable and there was no impingement posteriorly with hyper-extension and maximal external rotation.  With full extension, the knee could be fixed to bring the foot nearly to the buttock.  With the hip in ninety degrees of flexion and neutral rotation there was greater than 60 degrees of internal rotation before subluxation.  There appropriate movement with a \"renato\" test.  Happy with our stability exam, the final implant was placed in approximately twenty degrees of anteversion.  It sat within 1 mm of the broach.  We then trailed with a 0 head.  The stability exam was identical.  We then placed the final head on a clean, dry " neck and impacted it into place. The hip was reduced after directly visualizing the entire acetabulum.  The wound was then irrigated.  The posterior capsule and short external rotators were sutured to the greater trochanter with non-absorbable suture through bone tunnels.The fascia was closed with interrupted Vicryl, the dermis with interrupted Vicryl, and skin with running monocryl, Dermabond and steri-strips.  At the end of the procedure the sponge and needle counts were correct times two.  The patient tolerated the procedure well and returned to the PAR extubated and stable.    POSTOPERATIVE PLAN:  1. Weight bearing as tolerated  2. Standard posterior hip precautions  3. DVT prophylaxis   4. 24 hours of prophylactic antibiotics  5. Follow-up:  Wound clinic in 2 weeks and with Donovan in clinic in 6 weeks for x-rays and a rehabilitation check.

## 2023-11-29 NOTE — OR NURSING
PT called - scheduled for 1300 initially - daughter Lashaun is todays support person for fast track total joint and not present in waiting room. Able to reach by phone, unable to be present at the hospital until 2:45pm. States she wants to be instructed in person how to follow hip precautions. Discussed education given prior to surgery with total joint class and PT visit at 1pm. Follow-up with PT to arrange possible 2:45pm in phase 2. Daughter given PACU number to call with any updated if able to come earlier.

## 2023-11-29 NOTE — PROGRESS NOTES
[facility]  OUTPATIENT PHYSICAL THERAPY EVALUATION  PLAN OF TREATMENT FOR OUTPATIENT REHABILITATION  (COMPLETE FOR INITIAL CLAIMS ONLY)  Patient's Last Name, First Name, M.I.  YOB: 1955  Aníbal Goldstein                        Provider's Name  [facility] Medical Record No.  [id number]                             Onset Date:  11/29/23   Start of Care Date:  11/29/23   Type:     _X_PT   ___OT   ___SLP Medical Diagnosis:  s/p L CADE              PT Diagnosis:  Impaired functional mobility Visits from SOC:  1     See note for plan of treatment, functional goals and certification details    I CERTIFY THE NEED FOR THESE SERVICES FURNISHED UNDER        THIS PLAN OF TREATMENT AND WHILE UNDER MY CARE     (Physician co-signature of this document indicates review and certification of the therapy plan).                   11/29/23 1500   Appointment Info   Signing Clinician's Name / Credentials (PT) Loida Rivero DPT   Living Environment   People in Home spouse;child(zechariah), adult   Current Living Arrangements house   Home Accessibility stairs to enter home   Number of Stairs, Main Entrance 1   Stair Railings, Main Entrance railings on both sides of stairs   Number of Stairs, Within Home, Primary none   Transportation Anticipated family or friend will provide   Living Environment Comments Pt lives with wife in single level home, has 1 CHANDANA then all needs met on main level. Daughter will be staying with pt during recovery. Has tub/shower and walk in shower available   Self-Care   Usual Activity Tolerance good   Current Activity Tolerance good   Regular Exercise Yes   Activity/Exercise Type walking;other (see comments)  (brandt crowley)   Exercise Amount/Frequency daily   Equipment Currently Used at Home none   Fall history within last six months no   Activity/Exercise/Self-Care Comment Pt is IND for all ADLs, IADLs, and mobility at baseline. 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   General Information   Onset of Illness/Injury or Date of Surgery 11/29/23   Referring Physician Katheryn Black PA-C   Patient/Family Therapy Goals Statement (PT) Return home with IND mobility   Pertinent History of Current Problem (include personal factors and/or comorbidities that impact the POC) Aníbal Goldstein is a 68 year old male s/p Procedure(s):  Left total hip arthroplasty- FAST TRACK on 11/29/2023 with Dr. Man.   Existing Precautions/Restrictions no hip ADD past midline;90 degree hip flexion;no pivoting or twisting;no hip IR   Weight-Bearing Status - LUE full weight-bearing   Weight-Bearing Status - RUE full weight-bearing   Weight-Bearing Status - LLE weight-bearing as tolerated   Weight-Bearing Status - RLE full weight-bearing   Cognition   Affect/Mental Status (Cognition) WFL   Orientation Status (Cognition) oriented x 4   Follows Commands (Cognition) WFL   Pain Assessment   Patient Currently in Pain Yes, see Vital Sign flowsheet   Integumentary/Edema   Integumentary/Edema Comments L hip incision covered, PIV x1   Posture    Posture Forward head position   Range of Motion (ROM)   ROM Comment L hip ROM limited 2/2 surgical precautions + pain; all other ROM WFL   Strength (Manual Muscle Testing)   Strength (Manual Muscle Testing) strength is WFL;Able to perform R SLR;Able to perform L SLR   Bed Mobility   Bed Mobility supine-sit;sit-supine   Supine-Sit [place] (Bed Mobility) supervision;verbal cues   Sit-Supine [place] (Bed Mobility) supervision;verbal cues   Bed Mobility Limitations decreased ability to use legs for bridging/pushing   Impairments Contributing to Impaired Bed Mobility pain;decreased ROM;decreased strength   Transfers   Transfers bed-chair transfer;sit-stand transfer   Bed-Chair Transfer   Assistive Device (Bed-Chair Transfers) walker, front-wheeled   Bed-Chair [place] (Transfers)  supervision;verbal cues   Sit-Stand Transfer   Sit-Stand [place] (Transfers) contact guard;verbal cues   Gait/Stairs (Locomotion)   [place] Level (Gait) supervision;verbal cues   Assistive Device (Gait) walker, front-wheeled   Distance in Feet (Gait) 200'   Pattern (Gait) step-through   Deviations/Abnormal Patterns (Gait) antalgic;gait speed decreased;stride length decreased   Maintains Weight-bearing Status (Gait) able to maintain   Negotiation (Stairs) stairs independence;handrail location;number of steps;ascending technique;descending technique   [place] Level (Stairs) supervision;verbal cues   Handrail Location (Stairs) both sides   Number of Steps (Stairs) 3   Ascending Technique (Stairs) step-to-step   Descending Technique (Stairs) step-to-step   Balance   Balance no deficits were identified   Sensory Examination   Sensory Perception patient reports no sensory changes   Coordination   Coordination no deficits were identified   Muscle Tone   Muscle Tone no deficits were identified   Clinical Impression   Criteria for Skilled Therapeutic Intervention Yes, treatment indicated   PT Diagnosis (PT) Impaired functional mobility   Influenced by the following impairments posterior hip prec, pain   Functional limitations due to impairments bed mobility, transfers, gait, stairs, functional endurance   Clinical Presentation (PT Evaluation Complexity) stable   Clinical Presentation Rationale clinical judgement   Clinical Decision Making (Complexity) low complexity   Planned Therapy Interventions (PT) balance training;bed mobility training;gait training;home exercise program;neuromuscular re-education;patient/family education;postural re-education;ROM (range of motion);stair training;strengthening;transfer training;progressive activity/exercise;risk factor education;home program guidelines   Risk & Benefits of therapy have been explained evaluation/treatment results reviewed;care plan/treatment goals  reviewed;risks/benefits reviewed;current/potential barriers reviewed;participants voiced agreement with care plan;participants included;patient;daughter   PT Total Evaluation Time   PT Eval, Low Complexity Minutes (79417) 10   Therapy Certification   Start of care date 11/29/23   Certification date from 11/29/23   Certification date to 12/06/23   Medical Diagnosis s/p L CADE   Physical Therapy Goals   PT Frequency One time eval and treatment only   PT Predicted Duration/Target Date for Goal Attainment 12/06/23   PT Goals Bed Mobility;Transfers;Gait;Stairs   PT: Bed Mobility Modified independent;Supine to/from sit;Within precautions;Goal Met   PT: Transfers Modified independent;Sit to/from stand;Bed to/from chair;Assistive device;Within precautions;Goal Met   PT: Gait Modified independent;Assistive device;Rolling walker;Within precautions;Greater than 200 feet;Goal Met   PT: Stairs Supervision/stand-by assist;Within precautions;1 stair;Rail on both sides;Goal Met   Therapeutic Procedure/Exercise   Ther. Procedure: strength, endurance, ROM, flexibillity Minutes (84186) 10   Symptoms Noted During/After Treatment none   Treatment Detail/Skilled Intervention To increase LE strength and engagement post-op, pt instructed in the following supine exercises: APs, quad sets, glute sets, heel slides, SAQs, and SLR - all x5 reps on L with VCs for form, appropriate muscle engagement, and full ROM as able. Handout distributed and pt instructed to complete 2-3x/day.   Therapeutic Activity   Therapeutic Activities: dynamic activities to improve functional performance Minutes (68231) 13   Symptoms Noted During/After Treatment None   Treatment Detail/Skilled Intervention Pt greeted supine in bed with RN and daugther present, agreeable to therapy. Time for room setup. Verbally reviewed posterior hip precautions and appropriate activity modification. Facilitated supine<>sit with close SBA and VCs for technique. Pt with good adherence to  "precautions throughout. Facilitated sit<>stands x3 reps throughout session with CG-SBA, pt demo'ing good control. Following intervention, pt requesting to get up to the bathroom. Instructed in pushing FWW over toilet to provide added stability with standing. Pt left with all needs met and daughter present.   Gait Training   Gait Training Minutes (77766) 15   Symptoms Noted During/After Treatment (Gait Training) none   Treatment Detail/Skilled Intervention To increase activity tolerance and functional independence with gait, pt ambulated ~200' with FWW and CG-SBA + ~200' w/o AD and with CG-close SBA. VCs for shoulder depression and increased step length as able. Pt demo'ing good stability throughout, no balance impairment noted. Instructed in safe navigation of stairs via \"up with the good, down with the bad\" method. Pt ascended/descended x3 steps with SBA, bilat railing support, and VCs for maintenance of step-to pattern to decrease overall fall risk. Pt highly motivated to jump to reciprocal pattern. Time spent discussing safety with gait in home and community. Advised use of FWW initially to prevent fall.   PT Discharge Planning   PT Plan DC   PT Discharge Recommendation (DC Rec)   (defer to ortho)   PT Rationale for DC Rec Pt is mobilizing near independent baseline, has excellent support from family, and has existing OP PT set up. Safe to dc home.   PT Brief overview of current status modIND all mobility   Total Session Time   Timed Code Treatment Minutes 37   Total Session Time (sum of timed and untimed services) 47     "

## 2023-11-29 NOTE — ANESTHESIA PREPROCEDURE EVALUATION
Anesthesia Pre-Procedure Evaluation    Patient: Aníbal Goldstein   MRN: [id number] : 1955        Procedure : Procedure(s):  Left total hip arthroplasty- FAST TRACK          Past Medical History:   Diagnosis Date     Cervicalgia 2008     Hypertension 10-6-22     Pain in joint, shoulder region 2008      Past Surgical History:   Procedure Laterality Date     ABDOMEN SURGERY      Hernia repair     COLONOSCOPY       COLONOSCOPY WITH CO2 INSUFFLATION N/A 2021    Procedure: COLONOSCOPY, WITH CO2 INSUFFLATION;  Surgeon: Elder Bates DO;  Location: [facility] OR     HC KNEE SCOPE,MED/LAT MENISECTOMY  2009    Right knee     HERNIA REPAIR, INGUINAL RT/LT      lt ?     RELEASE CARPAL TUNNEL Right 2018    Procedure: RELEASE CARPAL TUNNEL;  Right carpal tunnel release;  Surgeon: Britney Delgado MD;  Location: [facility] OR     ROTATOR CUFF REPAIR RT/LT  2010    Right side. Done at [facility].      Allergies   Allergen Reactions     No Known Allergies       Social History     Tobacco Use     Smoking status: Never     Smokeless tobacco: Never   Substance Use Topics     Alcohol use: Yes     Alcohol/week: 1.7 standard drinks of alcohol     Types: 2 Standard drinks or equivalent per week     Comment: 0-1 per week       Wt Readings from Last 1 Encounters:   23 96.8 kg (213 lb 6.5 oz)        Anesthesia Evaluation   Pt has had prior anesthetic. Type: General.        ROS/MED HX  ENT/Pulmonary:       Neurologic:       Cardiovascular:     (+) Dyslipidemia hypertension- -   -  - -                                      METS/Exercise Tolerance:     Hematologic:       Musculoskeletal:       GI/Hepatic:       Renal/Genitourinary:       Endo: Comment: Pre DM      Psychiatric/Substance Use:       Infectious Disease:       Malignancy:       Other:          Physical Exam    Airway        Mallampati: II   TM distance: > 3 FB   Neck ROM: full   Mouth opening: > 3 cm    Respiratory Devices and  "Support         Dental       (+) Minor Abnormalities - some fillings, tiny chips      Cardiovascular   cardiovascular exam normal       Rhythm and rate: regular and normal     Pulmonary   pulmonary exam normal        breath sounds clear to auscultation       OUTSIDE LABS:  CBC:   Lab Results   Component Value Date    WBC 6.7 11/10/2023    WBC 4.4 10/06/2022    HGB 14.5 11/10/2023    HGB 14.6 10/06/2022    HCT 41.8 11/10/2023    HCT 42.9 10/06/2022     11/10/2023     10/06/2022     BMP:   Lab Results   Component Value Date     11/10/2023     10/06/2022    POTASSIUM 4.0 11/10/2023    POTASSIUM 3.9 10/06/2022    CHLORIDE 104 11/10/2023    CHLORIDE 105 10/06/2022    CO2 21 (L) 11/10/2023    CO2 24 10/06/2022    BUN 19.6 11/10/2023    BUN 15.6 10/06/2022    CR 0.82 11/10/2023    CR 0.79 10/06/2022    GLC 77 11/10/2023     (H) 10/06/2022     COAGS:   Lab Results   Component Value Date    PTT 26 10/04/2022    INR 0.99 10/04/2022     POC: No results found for: \"BGM\", \"HCG\", \"HCGS\"  HEPATIC: No results found for: \"ALBUMIN\", \"PROTTOTAL\", \"ALT\", \"AST\", \"GGT\", \"ALKPHOS\", \"BILITOTAL\", \"BILIDIRECT\", \"DERIK\"  OTHER:   Lab Results   Component Value Date    A1C 5.5 11/10/2023    OMER 8.7 (L) 11/10/2023    CRP 13.2 (H) 10/04/2022    SED 10 10/04/2022       Anesthesia Plan    ASA Status:  2    NPO Status:  NPO Appropriate    Anesthesia Type: Spinal.   Induction: Propofol.   Maintenance: TIVA.   Techniques and Equipment:     - Lines/Monitors: 2nd IV     Consents    Anesthesia Plan(s) and associated risks, benefits, and realistic alternatives discussed. Questions answered and patient/representative(s) expressed understanding.     - Discussed: Risks, Benefits and Alternatives for BOTH SEDATION and the PROCEDURE were discussed     - Discussed with:  Patient      - Extended Intubation/Ventilatory Support Discussed: No.      - Patient is DNR/DNI Status: No     Use of blood products discussed: Yes.     - " "Discussed with: Patient.     Postoperative Care    Pain management: IV analgesics, Oral pain medications, Multi-modal analgesia.   PONV prophylaxis: Ondansetron (or other 5HT-3), Dexamethasone or Solumedrol     Comments:             Wood Nina MD    I have reviewed the pertinent notes and labs in the chart from the past 30 days and (re)examined the patient.  Any updates or changes from those notes are reflected in this note.             # Drug Induced Platelet Defect: home medication list includes an antiplatelet medication  # Obesity: Estimated body mass index is 34.63 kg/m  as calculated from the following:    Height as of this encounter: 1.672 m (5' 5.83\").    Weight as of this encounter: 96.8 kg (213 lb 6.5 oz).      "

## 2023-11-29 NOTE — ANESTHESIA PROCEDURE NOTES
"Intrathecal injection Procedure Note    Pre-Procedure   Staff -        Anesthesiologist:  Wood Nina MD       Performed By: anesthesiologist       Location: OR       Procedure Start/Stop Times: 11/29/2023 7:35 AM and 11/29/2023 7:40 AM       Pre-Anesthestic Checklist: patient identified, IV checked, risks and benefits discussed, informed consent, monitors and equipment checked, pre-op evaluation and at physician/surgeon's request  Timeout:       Correct Patient: Yes        Correct Procedure: Yes        Correct Site: Yes        Correct Position: Yes   Procedure Documentation  Procedure: intrathecal injection       Patient Position: sitting       Skin prep: Betadine       Insertion Site: L4-5. (midline approach).       Needle Gauge: 25.        Needle Length (Inches): 3.5        Spinal Needle Type: Sprotte       Introducer used       Introducer: 20 G       # of attempts: 1 and  # of redirects:  0    Assessment/Narrative         Paresthesias: No.       CSF fluid: clear.    Medication(s) Administered   0.75% Hyperbaric Bupivacaine (Intrathecal) - Intrathecal   1.4 mL - 11/29/2023 7:40:00 AM  Medication Administration Time: 11/29/2023 7:35 AM      FOR [facility]:   Pain Team Contact information: please page the Pain Team Via [employer]. Search \"Pain\". During daytime hours, please page the attending first. At night please page the resident first.      "

## 2023-11-29 NOTE — BRIEF OP NOTE
Orthopaedic Surgery Brief Op-Note      Patient: Aníbal Goldstein  : 1955  Date of Service: 2023 9:57 AM    Pre-operative Diagnosis: Primary localized osteoarthritis of left hip [M16.12]  Post-operative Diagnosis: same    Procedure(s) Performed: Procedure(s):  Left total hip arthroplasty- FAST TRACK    Staff: Dr. Man  Assistants:   Katheryn Black PA-C    Anesthesia: Spinal  EBL: 200 cc    Implants:   Implant Name Type Inv. Item Serial No.  Lot No. LRB No. Used Action   IMP LINER SNR ACET R3 XLPE 0DEG [id number] - [id number] Total Joint Component/Insert IMP LINER SNR ACET R3 XLPE 0DEG [id number]  [employer] [id number] Left 1 Implanted   IMP SHELL SNR ACET R3 3H 50MM [id number] - [id number] Total Joint Component/Insert IMP SHELL SNR ACET R3 3H 50MM [id number]  [employer] [id number] Left 1 Implanted   IMP SCR ACET SNN SPHERICAL HEAD 6.5X20MM [id number] - [id number] Metallic Hardware/[place] IMP SCR ACET SNN SPHERICAL HEAD 6.5X20MM [id number]  [employer] [id number] Left 1 Implanted   IMP SCR ACET SNN SPHERICAL HEAD 6.5X40MM [id number] - [id number] Metallic Hardware/[place] IMP SCR ACET SNN SPHERICAL HEAD 6.5X40MM [id number]  [employer] [id number] Left 1 Implanted   IMP STEM FEM HIP SNN SYNERGY POROUS SZ 12 HO [id number] - [id number] Total Joint Component/Insert IMP STEM FEM HIP SNN SYNERGY POROUS SZ 12 HO [id number]  [employer] [id number] Left 1 Implanted   IMP HEAD FEMORAL SNR [place] 32MM +0 [id number] - [id number] Total Joint Component/Insert IMP HEAD FEMORAL SNR [place] 32MM +0 [id number]  [employer] [id number] Left 1 Implanted     Drains: none  Intra-op Labs/Cxs/Specimens: None  Complications: No apparent complications during procedure  Findings: Please see dictated operative note for details    Disposition: Stable to PACU, then admit to Orthopaedics    POST OPERATIVE PLAN    Assessment/Plan: Aníbal Goldstein is a 68 year old male s/p  Procedure(s):  Left total hip arthroplasty- FAST TRACK on 11/29/2023 with Dr. Man.    Activity: Up with assist and assistive devices as needed until independent. Posterior hip precautions to operative side x 3 months:  1) No hip flexion beyond 90 degrees  2) No adduction beyond midline  3) No internal rotation beyond neutral   Weight bearing status: WBAT  Antibiotics:  Perioperative ancef  Diet: Begin with clear fluids and progress diet as tolerated. Bowel regimen. Anti-emetics PRN.    DVT prophylaxis: Resume PTA  mg daily POD 1  Elevation: Elevate heels off of bed on pillows   Wound Care: Tegaderm and alginate x 2 weeks   Drains: none  Pain management: Orals PRN, IV for breakthrough only  X-rays: AP Pelvis and Lateral operative hip in PACU.   Physical Therapy: Mobilization, ROM, ADL's, Posterior hip precautions, to eval in PACU   Occupational Therapy: ADL's  Consults: PT, OT, appreciate assistance in caring for this patient throughout the perioperative period    Future Appointments   Date Time Provider Department Center   11/29/2023  1:00 PM Loida Rivero, PT URPT [place]   11/29/2023  2:00 PM Morena Redmond, OTR UROT [place]   12/6/2023 10:20 AM Hilligoss, Amanda K, PT [facility] [place]   12/12/2023  3:00 PM [facility] NURSE ONLY ORTHO [facility] [place]   12/13/2023 10:20 AM Hilligoss, Amanda K, PT [facility] [place]   12/20/2023  1:30 PM Hilligoss, Amanda K, PT [facility] [place]   1/9/2024  8:40 AM Jac Man MD [facility]       Disposition: Pending progress with therapies, pain control on orals, and medical stability, anticipate discharge to Home from PACU POD 0    I assisted with positioning, prepping and draping, and closure.      Katheryn Black PA-C 11/29/2023 9:57 AM  Orthopedic Surgery

## 2023-11-29 NOTE — PROGRESS NOTES
[facility]  OUTPATIENT OCCUPATIONAL THERAPY  EVALUATION  PLAN OF TREATMENT FOR OUTPATIENT REHABILITATION  (COMPLETE FOR INITIAL CLAIMS ONLY)  Patient's Last Name, First Name, M.I.  YOB: 1955  Aníbal Goldstein                          Provider's Name  [facility] Medical Record No.  [id number]                             Onset Date:  11/29/23   Start of Care Date:      Type:     ___PT   _X_OT   ___SLP Medical Diagnosis:                       OT Diagnosis:  Decreased IND w/ ADL and mobility Visits from SOC:  1     See note for plan of treatment, functional goals and certification details    I CERTIFY THE NEED FOR THESE SERVICES FURNISHED UNDER        THIS PLAN OF TREATMENT AND WHILE UNDER MY CARE     (Physician co-signature of this document indicates review and certification of the therapy plan).                 11/29/23 1600   Appointment Info   Signing Clinician's Name / Credentials (OT) Morena Redmond OTR/L   Living Environment   People in Home spouse   Current Living Arrangements house   Home Accessibility stairs to enter home   Number of Stairs, Main Entrance 1   Stair Railings, Main Entrance railings on both sides of stairs   Number of Stairs, Within Home, Primary none   Transportation Anticipated family or friend will provide   Living Environment Comments Pt lives wtih spouse in single level home. Spouse had spinal surgery ~9wks ago and limited in how much assistance she can provide. Pt reports daughter will be staying with them upon return home. Tub shower with shower chair, also has access to walk in shower   Self-Care   Usual Activity Tolerance good   Current Activity Tolerance good   Regular Exercise Yes   Activity/Exercise Type walking   Exercise Amount/Frequency daily   Equipment Currently Used at Home walker, rolling;shower chair;raised toilet seat;hospital bed;other (see comments)  (power recliner)   Fall history within last  "six months no   Activity/Exercise/Self-Care Comment Pt reports IND with ADLs at baseline, very active, working as hobby farmer. Reporting spouse will be able to assist with some ADLs as needed upon return home   Instrumental Activities of Daily Living (IADL)   Previous Responsibilities meal prep;housekeeping;medication management;yardwork;finances;driving   IADL Comments Pt IND w/ IADLs at baseline. Spouse and daughter able to assist with household tasks upon discharge.   General Information   Onset of Illness/Injury or Date of Surgery 11/29/23   Referring Physician Joe Kraus, DO   Patient/Family Therapy Goal Statement (OT) to return home   Additional Occupational Profile Info/Pertinent History of Current Problem Per chart: \"Aníbal Goldstein is a 68 year old male s/p Procedure(s):  Left total hip arthroplasty- FAST TRACK on 11/29/2023 with Dr. Man.\"   Existing Precautions/Restrictions no hip IR;no hip ADD past midline;90 degree hip flexion;no pivoting or twisting   Left Upper Extremity (Weight-bearing Status) full weight-bearing (FWB)   Right Upper Extremity (Weight-bearing Status) full weight-bearing (FWB)   Left Lower Extremity (Weight-bearing Status) weight-bearing as tolerated (WBAT)   Right Lower Extremity (Weight-bearing Status) full weight-bearing (FWB)   General Observations and Info Pt greeted ambulating within room w/ fww, mobililizing well mod-I   Cognitive Status Examination   Orientation Status orientation to person, place and time   Cognitive Status Comments Cognition appears WNL   Pain Assessment   Patient Currently in Pain Yes, see Vital Sign flowsheet   Posture   Posture forward head position   Range of Motion Comprehensive   General Range of Motion bilateral upper extremity ROM WFL   Strength Comprehensive (MMT)   General Manual Muscle Testing (MMT) Assessment no strength deficits identified   Coordination   Upper Extremity Coordination No deficits were identified   Bed Mobility   Comment " (Bed Mobility) Per clinical judgement, SBA   Transfers   Transfer Comments SBA STS to fww   Balance   Balance Assessment standing dynamic balance   Balance Comments SBA w/ fww   Activities of Daily Living   BADL Assessment/Intervention bathing;lower body dressing;grooming;toileting   Bathing Assessment/Intervention   Comment, (Bathing) Per clinical judgement, SBA w/ AD   Lower Body Dressing Assessment/Training   Comment, (Lower Body Dressing) Per clinical judgement, Ap   Grooming Assessment/Training   Comment, (Grooming) SBA standing at sink   Toileting   Comment, (Toileting) SBA w/ grab bars and fww   Clinical Impression   Criteria for Skilled Therapeutic Interventions Met (OT) Yes, treatment indicated   OT Diagnosis Decreased IND w/ ADL and mobility   Influenced by the following impairments post op pain, posterior hip precautions, decreased activity tolerance   OT Problem List-Impairments impacting ADL problems related to;activity tolerance impaired;mobility;strength;pain;post-surgical precautions   Assessment of Occupational Performance 3-5 Performance Deficits   Identified Performance Deficits bathing, dressing, toileting, home mgmt, work   Planned Therapy Interventions (OT) ADL retraining;transfer training;progressive activity/exercise;home program guidelines   Clinical Decision Making Complexity (OT) problem focused assessment/low complexity   Risk & Benefits of therapy have been explained evaluation/treatment results reviewed;care plan/treatment goals reviewed;risks/benefits reviewed;current/potential barriers reviewed;participants voiced agreement with care plan;participants included;patient;daughter   OT Total Evaluation Time   OT Eval, Low Complexity Minutes (43798) 6   OT Goals   Therapy Frequency (OT) One time eval and treatment   OT Predicted Duration/Target Date for Goal Attainment 11/29/23   OT Goals Toilet Transfer/Toileting;OT Goal 1   OT: Toilet Transfer/Toileting Modified independent;toilet  transfer;within precautions   OT: Goal 1 Pt will simulate completion of tub transfer w/ use of AD as needed.   Self-Care/Home Management   Self-Care/Home Mgmt/ADL, Compensatory, Meal Prep Minutes (52880) 23   Symptoms Noted During/After Treatment (Meal Preparation/Planning Training) fatigue   Treatment Detail/Skilled Intervention Pt greeted standing at sink w/ fww, agreeable to therapy session. OT eval completed and treatment initiated. Reviewed post hip precautions and implications for self-cares including dressing, toileting, and bathing. Discussed use of DME in bathroom including tub bench, grab bars, and non slip mat. Pt reporting having a shower chair at home, refusing addition of grab bar. Th educated in completion of tub transfer within precautions, pt demo'ing understanding, simulating completion in home env with side step technique and use of fww within precautions. Pt declining trial of LB dressing, stating he has several reachers around his home and is familiar with use, reporting spouse and daughter will also be available to assist as needed. Pt ambulated into bathroom w/ fww and SBA, demo'ed completion of toilet transfer w/ use of grab bars, SBA. Pt and daughter reporting no further questions or concerns. Left with all needs met.   OT Discharge Planning   OT Plan d/c OT   OT Discharge Recommendation (DC Rec)   (defer to ortho)   OT Rationale for DC Rec Pt near functional baseline, ambulating w/ SBA and fww post op. Pt has excellent family support and will have 24/7 assist for I/ADL completion upon return home. Recommend follow up with OP PT to progress functional strength, endurance and safety with mobility.   Total Session Time   Timed Code Treatment Minutes 23   Total Session Time (sum of timed and untimed services) 29

## 2023-11-29 NOTE — PLAN OF CARE
Physical Therapy Discharge Summary    Reason for therapy discharge:    All goals and outcomes met, no further needs identified.    Progress towards therapy goal(s). See goals on Care Plan in [facility] electronic health record for goal details.  Goals met    Therapy recommendation(s):    Continue home exercise program.

## 2023-11-29 NOTE — DISCHARGE INSTRUCTIONS
Same-Day Surgery   Adult Discharge Orders & Instructions     For 24 hours after surgery:  Get plenty of rest.  A responsible adult must stay with you for at least 24 hours after you leave the hospital.   Pain medication can slow your reflexes. Do not drive or use heavy equipment.  If you have weakness or tingling, don't drive or use heavy equipment until this feeling goes away.  Mixing alcohol and pain medication can cause dizziness and slow your breathing. It can even be fatal. Do not drink alcohol while taking pain medication.  Avoid strenuous or risky activities.  Ask for help when climbing stairs.   You may feel lightheaded.  If so, sit for a few minutes before standing.  Have someone help you get up.   If you have nausea (feel sick to your stomach), drink only clear liquids such as apple juice, ginger ale, broth or 7-Up.  Rest may also help.  Be sure to drink enough fluids.  Move to a regular diet as you feel able. Take pain medications with a small amount of solid food, such as toast or crackers, to avoid nausea.   A slight fever is normal. Call the doctor if your fever is over 100 F (37.7 C) (taken under the tongue) or lasts longer than 24 hours.  You may have a dry mouth, muscle aches, trouble sleeping or a sore throat.  These symptoms should go away after 24 hours.  Do not make important or legal decisions.   Pain Management:      1. Take pain medication (if prescribed) for pain as directed by your physician.        2. WARNING: If the pain medication you have been prescribed contains Tylenol  (acetaminophen), DO NOT take additional doses of Tylenol (acetaminophen).     Call your doctor for any of the followin.  Signs of infection (fever, growing tenderness at the surgery site, severe pain, a large amount of drainage or bleeding, foul-smelling drainage, redness, swelling).    2.  It has been over 8 to 10 hours since surgery and you are still not able to urinate (pee).    3.  Headache for over 24  hours.    4.  Numbness, tingling or weakness the day after surgery (if you had spinal anesthesia).  To contact a doctor, call Dr Man's office at [phone] ([facility]) Or [phone]   or:  '   [phone] and ask for the Resident On Call for:          Ortho (answered 24 hours a day)  '   Emergency Department:  [place] [facility]: [phone]  [place] [facility]: [phone]               Rev. 10/2014

## 2023-11-29 NOTE — ANESTHESIA CARE TRANSFER NOTE
Patient: Aníbal Goldstein    Procedure: Procedure(s):  Left total hip arthroplasty- FAST TRACK       Diagnosis: Primary localized osteoarthritis of left hip [M16.12]  Diagnosis Additional Information: No value filed.    Anesthesia Type:   Spinal     Note:    Oropharynx: oropharynx clear of all foreign objects  Level of Consciousness: awake  Oxygen Supplementation: face mask  Level of Supplemental Oxygen (L/min / FiO2): 6  Independent Airway: airway patency satisfactory and stable  Dentition: dentition unchanged  Vital Signs Stable: post-procedure vital signs reviewed and stable  Report to RN Given: handoff report given  Patient transferred to: PACU    Handoff Report: Identifed the Patient, Identified the Reponsible Provider, Reviewed the pertinent medical history, Discussed the surgical course, Reviewed Intra-OP anesthesia mangement and issues during anesthesia, Set expectations for post-procedure period and Allowed opportunity for questions and acknowledgement of understanding      Vitals:  Vitals Value Taken Time   /59 11/29/23 0955   Temp 36.7    Pulse 76 11/29/23 0959   Resp 10 11/29/23 0959   SpO2 97 % 11/29/23 0959   Vitals shown include unfiled device data.    Electronically Signed By: BRIAN Alfaro CRNA  November 29, 2023  10:00 AM

## 2023-12-06 ENCOUNTER — THERAPY VISIT (OUTPATIENT)
Dept: PHYSICAL THERAPY | Facility: CLINIC | Age: 68
End: 2023-12-06
Attending: ORTHOPAEDIC SURGERY
Payer: COMMERCIAL

## 2023-12-06 DIAGNOSIS — M25.552 HIP PAIN, LEFT: Primary | ICD-10-CM

## 2023-12-06 DIAGNOSIS — Z96.642 STATUS POST TOTAL REPLACEMENT OF LEFT HIP: ICD-10-CM

## 2023-12-06 DIAGNOSIS — R26.9 ABNORMAL GAIT: ICD-10-CM

## 2023-12-06 PROCEDURE — 97110 THERAPEUTIC EXERCISES: CPT | Mod: GP | Performed by: PHYSICAL THERAPIST

## 2023-12-06 PROCEDURE — 97161 PT EVAL LOW COMPLEX 20 MIN: CPT | Mod: GP | Performed by: PHYSICAL THERAPIST

## 2023-12-06 NOTE — PROGRESS NOTES
PHYSICAL THERAPY EVALUATION  Type of Visit: Evaluation    See electronic medical record for Abuse and Falls Screening details.    Subjective       Presenting condition or subjective complaint: Total hip left, recommended to start PT 1 week after surgery  Date of onset: 11/29/23    Relevant medical history:     Dates & types of surgery: Hemant shoulder surgery [place] 2014??    Prior diagnostic imaging/testing results: X-ray     Prior therapy history for the same diagnosis, illness or injury: Yes 2023 Amaris Hilligoss    Prior Level of Function  Transfers: Independent  Ambulation: Independent  ADL: Independent      Living Environment  Social support: With a significant other or spouse   Type of home: House; 1 level   Stairs to enter the home: Yes 1 Is there a railing: No   Ramp: No   Stairs inside the home: No       Help at home: Self Cares (home health aide/personal care attendant, family, etc); Home management tasks (cooking, cleaning); Medication and/or finances; Home and Yard maintenance tasks; Assist for driving and community activities  Equipment owned: Straight Cane; Four-point cane; Walker; Walker with wheels; Crutches; Bath bench     Employment: Yes  [employer]  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    Patient goals for therapy: Get back to regular routine at home and work    Pain assessment: Pain present  See objective evaluation for additional pain details     Objective   HIP EVALUATION  PAIN: Pain Level at Rest: 2/10  Pain Level with Use: 7/10  Pain Location: hip  Pain Quality: Aching and Burning  Pain Frequency: intermittent  Pain is Worst: dependent on activity vs time of day  Pain is Exacerbated By: walking, WB, transfers  Pain is Relieved By: cold, NSAIDs, and rest  Pain Progression: Improved  INTEGUMENTARY (edema, incisions):  will have bandage removed  12/11/23; reviewed signs of infection to watch out for, no signs observed  POSTURE: Standing Posture: L foot slight toe in; stands w/  slight decrease in hip flex  GAIT:   Weightbearing Status: WBAT  Assistive Device(s): Cane (tripod)  Gait Deviations: Antalgic  Stance time decreased  Stride length decreased  Carmen decreased  BALANCE/PROPRIOCEPTION:   WEIGHTBEARING ALIGNMENT:   NON-WEIGHTBEARING ALIGNMENT:    ROM:   (Degrees) Left AROM Left PROM  Right AROM Right PROM   Hip Flexion 60 +pain (w/ heel slide), marching from hooklying 75+ pain   110    Hip Extension Lacking 12 (measured in standing)   0    Hip Abduction Supine sliding board: 28  40 (AAROM)    Hip Adduction Not tested due to surgical precautions      Hip Internal Rotation Not tested due to surgical precautions      Hip External Rotation 30 (measured at foot in supine)  80 (at foot)    Knee Flexion 135      Knee Extension       Lumbar Side glide     Lumbar Flexion    Lumbar Extension    Pain:   End feel:     PELVIC/SI SCREEN:   STRENGTH: SLR to 60 deg L independently, able to transfer independently  LE FLEXIBILITY:   SPECIAL TESTS:   FUNCTIONAL TESTS:   PALPATION:   JOINT MOBILITY: not tested    Assessment & Plan   CLINICAL IMPRESSIONS  Medical Diagnosis: S/P L CADE    Treatment Diagnosis: L hip pain, antalgic gait, S/P L CADE   Impression/Assessment: Patient is a 68 year old male with Left Hip complaints.  The following significant findings have been identified: Pain, Decreased ROM/flexibility, Decreased strength, Impaired balance, Decreased proprioception, Impaired muscle performance, Decreased activity tolerance, and Impaired posture. These impairments interfere with their ability to perform self care tasks, work tasks, recreational activities, household chores, driving , household mobility, and community mobility as compared to previous level of function.     Clinical Decision Making (Complexity):  Clinical Presentation: Stable/Uncomplicated  Clinical Presentation Rationale: based on medical and personal factors listed in PT evaluation  Clinical Decision Making (Complexity): Low  complexity    PLAN OF CARE  Treatment Interventions:  Modalities: Cryotherapy  Interventions: Gait Training, Manual Therapy, Neuromuscular Re-education, Therapeutic Activity, Therapeutic Exercise, Self-Care/Home Management    Long Term Goals     PT Goal 1  Goal Identifier: Gait  Goal Description: Ambulating w/o AD x 30 min w/o abnormal gait or pain increase  Rationale: to maximize safety and independence within the home;to maximize safety and independence within the community  Target Date: 02/28/24  PT Goal 2  Goal Identifier: Stairs  Goal Description: Pt will be able to ascend/descend stairs in normal reciprical pattern w/o pain  Rationale: to maximize safety and independence within the home;to maximize safety and independence within the community  Target Date: 02/28/24      Frequency of Treatment: 1x/weel  Duration of Treatment: x12 weeks    Recommended Referrals to Other Professionals:  none  Education Assessment:   Learner/Method: Patient;Listening;Reading;Demonstration;Pictures/Video;No Barriers to Learning    Risks and benefits of evaluation/treatment have been explained.   Patient/Family/caregiver agrees with Plan of Care.     Evaluation Time:     PT Eval, Low Complexity Minutes (27682): 20     Signing Clinician: Amanda Hilligoss, PT      [facility]                                                                                   OUTPATIENT PHYSICAL THERAPY      PLAN OF TREATMENT FOR OUTPATIENT REHABILITATION   Patient's Last Name, First Name, Aníbal Slade YOB: 1955   Provider's Name   [facility]   Medical Record No.  [id number]     Onset Date: 11/29/23  Start of Care Date: 12/06/23     Medical Diagnosis:  S/P L CADE      PT Treatment Diagnosis:  L hip pain, antalgic gait, S/P L CADE Plan of Treatment  Frequency/Duration: 1x/weel/ x12 weeks    Certification date from 12/06/23 to 03/04/24         See note for plan of  treatment details and functional goals     Amanda Hilligoss, PT                         I CERTIFY THE NEED FOR THESE SERVICES FURNISHED UNDER        THIS PLAN OF TREATMENT AND WHILE UNDER MY CARE     (Physician attestation of this document indicates review and certification of the therapy plan).              Referring Provider:  Jac Man    Initial Assessment  See Epic Evaluation- Start of Care Date: 12/06/23

## 2023-12-12 ENCOUNTER — ALLIED HEALTH/NURSE VISIT (OUTPATIENT)
Dept: NURSING | Facility: CLINIC | Age: 68
End: 2023-12-12
Payer: COMMERCIAL

## 2023-12-12 DIAGNOSIS — Z96.642 STATUS POST TOTAL REPLACEMENT OF LEFT HIP: Primary | ICD-10-CM

## 2023-12-12 PROCEDURE — 99207 PR NO CHARGE NURSE ONLY: CPT

## 2023-12-12 RX ORDER — TRAMADOL HYDROCHLORIDE 50 MG/1
50-100 TABLET ORAL EVERY 6 HOURS PRN
Qty: 30 TABLET | Refills: 0 | Status: CANCELLED | OUTPATIENT
Start: 2023-12-12 | End: 2023-12-16

## 2023-12-12 RX ORDER — OXYCODONE HYDROCHLORIDE 5 MG/1
5 TABLET ORAL EVERY 6 HOURS PRN
Qty: 20 TABLET | Refills: 0 | Status: SHIPPED | OUTPATIENT
Start: 2023-12-12 | End: 2023-12-17

## 2023-12-12 NOTE — PATIENT INSTRUCTIONS
"Continue anticoagulation plan of:  Aspirin 81mg twice daily until prescribed bottle is gone.      If incision is dry, OK to leave open to air (no bandage). If incision is draining, cover with gauze and call the office. If steri strips (tapes) applied, let fall off on their own. Please do not apply any ointments or lotions to incision. No soaking in tubs or pools for 3 months after surgery.    If any swelling in leg(s), elevate surgical leg above heart (lay flat, elevate leg on blankets or pillows    *Continue with post-op hip precautions. These are strict for 3 months post surgery, but should be followed for life. As your muscles gain strength, you will notice that you will have more range of motion, but your risk of dislocating remains.    NO bending past 90 degrees at the waist (operative side)   NO crossing your operative leg over or under your non-operative leg   NO turning your operative leg inward     *Please call if a sharp increase in pain, fall, change in movement or sensation, chest pain, calf pain, shortness of breath, fevers greater than 101.4, redness around the incision sites.    *If needing refills on pain meds, please call clinic at least 3 days before you run out.    *Continue physical therapy as directed.  If needing orders for Outpatient Physical therapy, please call the clinic.    *Continue to use assistive device: cane or crutch until no limp. Discuss with therapist if questions.    *Dr. Man advises no dental work or cleaning until 6 months after any surgery with implants to hips or knees unless emergent issue arises. This includes total joint surgeries. Once you are 6 months past your surgery, you will need prophylactic antibiotics for 2 years with any cleaning or dental work.  This is also for any other \"dirty\" procedures that you may have, such as a colonoscopy.    If you have any questions, call the clinic at [phone] and ask for Dr. Daniel team to leave a message with. "     ----------------------------------------------------------------------      Thanks for coming today.  [facility]  [address] [place] [address]    To schedule future appointments in Ortho Clinic, you may call [phone].    To schedule ordered imaging or an injection ordered by your provider:  Call [facility] Injection scheduling line: [phone]    [employer] available online at:  [email or web address]    Please call if any further questions or concerns ([phone]).  Clinic hours 8 am to 5 pm.    Return to clinic (call) if symptoms worsen or fail to improve.

## 2023-12-12 NOTE — PROGRESS NOTES
Aníbal Goldstein comes into clinic today at the request of Dr. Man for suture removal and wound check. The patient is status post LTHA on 11/29/23.     Incision clean, dry and intact.  Steri-strips removed. Incision cleaned. Pt instructed on wound care and symptoms of infection to watch for.     Discussed anticoagulation. Pt is currently taking Aspirin 162mg. Pt advised against any NSAIDs while on any anticoagulation med (ASA, Lovenox,or Coumadin).      Discussed current pain medication regime. Pt currently taking tyelnol and occasional Oxycodone about 1-2 per day.     Discussed current physical therapy program. Pt is [facility] 1-2 times a week.     Patient using cane to aid in ambulation.    Discussed edema. Patient has moderate edema at his incision site.     Reviewed Hip precautions.        Total time spent with Pt: 30 minutes.    Melisa Thornton RN

## 2023-12-13 ENCOUNTER — THERAPY VISIT (OUTPATIENT)
Dept: PHYSICAL THERAPY | Facility: CLINIC | Age: 68
End: 2023-12-13
Attending: ORTHOPAEDIC SURGERY
Payer: COMMERCIAL

## 2023-12-13 DIAGNOSIS — M25.552 HIP PAIN, LEFT: Primary | ICD-10-CM

## 2023-12-13 DIAGNOSIS — R26.9 ABNORMAL GAIT: ICD-10-CM

## 2023-12-13 DIAGNOSIS — Z96.642 STATUS POST TOTAL REPLACEMENT OF LEFT HIP: ICD-10-CM

## 2023-12-13 PROCEDURE — 97140 MANUAL THERAPY 1/> REGIONS: CPT | Mod: GP

## 2023-12-13 PROCEDURE — 97112 NEUROMUSCULAR REEDUCATION: CPT | Mod: GP

## 2023-12-13 PROCEDURE — 97110 THERAPEUTIC EXERCISES: CPT | Mod: GP

## 2023-12-14 DIAGNOSIS — Z96.642 STATUS POST TOTAL REPLACEMENT OF LEFT HIP: Primary | ICD-10-CM

## 2023-12-20 ENCOUNTER — ANCILLARY PROCEDURE (OUTPATIENT)
Dept: GENERAL RADIOLOGY | Facility: OTHER | Age: 68
End: 2023-12-20
Attending: ORTHOPAEDIC SURGERY
Payer: COMMERCIAL

## 2023-12-20 ENCOUNTER — THERAPY VISIT (OUTPATIENT)
Dept: PHYSICAL THERAPY | Facility: CLINIC | Age: 68
End: 2023-12-20
Attending: ORTHOPAEDIC SURGERY
Payer: COMMERCIAL

## 2023-12-20 ENCOUNTER — TELEPHONE (OUTPATIENT)
Dept: ORTHOPEDICS | Facility: CLINIC | Age: 68
End: 2023-12-20

## 2023-12-20 DIAGNOSIS — Z96.642 STATUS POST TOTAL REPLACEMENT OF LEFT HIP: ICD-10-CM

## 2023-12-20 DIAGNOSIS — R26.9 ABNORMAL GAIT: ICD-10-CM

## 2023-12-20 DIAGNOSIS — M25.552 HIP PAIN, LEFT: Primary | ICD-10-CM

## 2023-12-20 PROCEDURE — 97112 NEUROMUSCULAR REEDUCATION: CPT | Mod: GP

## 2023-12-20 PROCEDURE — 73501 X-RAY EXAM HIP UNI 1 VIEW: CPT | Mod: TC | Performed by: RADIOLOGY

## 2023-12-20 PROCEDURE — 97110 THERAPEUTIC EXERCISES: CPT | Mod: GP

## 2023-12-20 NOTE — TELEPHONE ENCOUNTER
Called and spoke with Pt to inform them they may have to wait until after their 6 week post op visit before we can schedule.

## 2023-12-20 NOTE — TELEPHONE ENCOUNTER
Spoke with the patient to let him know that Dr. Man has not placed the surgery order for his Right hip as of yet. Dr. Man will assess on 1/9 and give us the go ahead to get the other side scheduled. He is hoping to have surgery on 2/12 or 2/26 at [facility] to have the surgery around the middle of February if possible.     Amena Antonio  Surgery Scheduling Coordinator  Ph: [phone]

## 2023-12-20 NOTE — TELEPHONE ENCOUNTER
Sachin calling in today wanting to speak to the care team about moving forward with surgery on right hip. He'd like to get this scheduled. Please call him back to discuss next steps

## 2024-01-03 ENCOUNTER — THERAPY VISIT (OUTPATIENT)
Dept: PHYSICAL THERAPY | Facility: CLINIC | Age: 69
End: 2024-01-03
Payer: COMMERCIAL

## 2024-01-03 DIAGNOSIS — Z96.642 STATUS POST TOTAL REPLACEMENT OF LEFT HIP: ICD-10-CM

## 2024-01-03 DIAGNOSIS — R26.9 ABNORMAL GAIT: ICD-10-CM

## 2024-01-03 DIAGNOSIS — M25.552 HIP PAIN, LEFT: Primary | ICD-10-CM

## 2024-01-03 PROCEDURE — 97530 THERAPEUTIC ACTIVITIES: CPT | Mod: GP | Performed by: PHYSICAL THERAPY ASSISTANT

## 2024-01-03 PROCEDURE — 97112 NEUROMUSCULAR REEDUCATION: CPT | Mod: GP | Performed by: PHYSICAL THERAPY ASSISTANT

## 2024-01-03 PROCEDURE — 97110 THERAPEUTIC EXERCISES: CPT | Mod: GP | Performed by: PHYSICAL THERAPY ASSISTANT

## 2024-01-03 ASSESSMENT — ACTIVITIES OF DAILY LIVING (ADL)
GETTING_INTO_AND_OUT_OF_AN_AVERAGE_CAR: SLIGHT DIFFICULTY
HOS_ADL_ITEM_SCORE_TOTAL: 53
WALKING_DOWN_STEEP_HILLS: NO DIFFICULTY AT ALL
WALKING_UP_STEEP_HILLS: NO DIFFICULTY AT ALL
LIGHT_TO_MODERATE_WORK: SLIGHT DIFFICULTY
STANDING_FOR_15_MINUTES: SLIGHT DIFFICULTY
HOS_ADL_HIGHEST_POTENTIAL_SCORE: 64
ROLLING_OVER_IN_BED: SLIGHT DIFFICULTY
DEEP_SQUATTING: SLIGHT DIFFICULTY
HOS_ADL_SCORE(%): 82.81
GETTING_INTO_AND_OUT_OF_A_BATHTUB: SLIGHT DIFFICULTY
GOING_UP_1_FLIGHT_OF_STAIRS: NO DIFFICULTY AT ALL
WALKING_INITIALLY: SLIGHT DIFFICULTY
HOS_ADL_COUNT: 16
HEAVY_WORK: SLIGHT DIFFICULTY
SITTING_FOR_15_MINUTES: SLIGHT DIFFICULTY
PUTTING_ON_SOCKS_AND_SHOES: SLIGHT DIFFICULTY
WALKING_15_MINUTES_OR_GREATER: SLIGHT DIFFICULTY
WALKING_APPROXIMATELY_10_MINUTES: NO DIFFICULTY AT ALL
RECREATIONAL_ACTIVITIES: MODERATE DIFFICULTY
GOING_DOWN_1_FLIGHT_OF_STAIRS: NO DIFFICULTY AT ALL
STEPPING_UP_AND_DOWN_CURBS: NO DIFFICULTY AT ALL

## 2024-01-04 PROBLEM — M25.552 HIP PAIN, LEFT: Status: RESOLVED | Noted: 2023-02-23 | Resolved: 2024-01-04

## 2024-01-04 NOTE — PROGRESS NOTES
DISCHARGE  Reason for Discharge: Patient chooses to discontinue therapy as he is independent w/ HEP. He will return to MD and is hopeful to have R CADE in near future as this is limiting him more than his Left side now.    Equipment Issued: none    Discharge Plan: Patient to continue home program.    Referring Provider:  Jac Man       01/03/24 0500   Appointment Info   Signing clinician's name / credentials Karen Jacobo PTA   Total/Authorized Visits 12 (E&T)   Visits Used 3   Medical Diagnosis S/P L CADE   PT Tx Diagnosis L hip pain, antalgic gait, S/P L CADE   Quick Adds Certification;PTA Supervision   Progress Note/Certification   Start of Care Date 12/06/23   Onset of illness/injury or Date of Surgery 11/29/23   Therapy Frequency 1x/week   Predicted Duration x12 weeks   Certification date from 12/06/23   Certification date to 03/04/24   Progress Note Due Date 01/09/24   Progress Note Completed Date 01/03/24   Supervision   Student Supervision Direct supervision provided;Direct Patient Contact Provided   PT Assistant Visit Number 1   GOALS   PT Goals 2   PT Goal 1   Goal Identifier Gait   Goal Description Ambulating w/o AD x 30 min w/o abnormal gait or pain increase   Rationale to maximize safety and independence within the home;to maximize safety and independence within the community   Goal Progress only using cane occasionally during the night for steadiness if gets up to use restroom, able to walk 15-20 min without AD normal gait   Target Date 02/28/24   Date Met   (improving, progressing towards goal)   PT Goal 2   Goal Identifier Stairs   Goal Description Pt will be able to ascend/descend stairs in normal reciprical pattern w/o pain   Rationale to maximize safety and independence within the home;to maximize safety and independence within the community   Goal Progress reciprocal pattern with railing, R hip bothers more then L hip   Target Date 02/28/24   Date Met   (improving, progressing towards  "goal)   Subjective Report   Subjective Report Pt has been seen for 3 PT sessions following L CADE on 11/29/23, pt reports 90% improved with daily function with L LE, R hip is more bothersome at this time-plans to discuss future CADE at next MD session.   Objective Measures   Objective Measures Objective Measure 1;Objective Measure 2;Objective Measure 3;Objective Measure 4   Objective Measure 1   Objective Measure Gait   Details no AD today, normal pattern present with L LE   Objective Measure 2   Objective Measure Step Ups   Details 8\", step fwd with good control 4\", able to perform reciprocal gait on stairs although needs railing for control and pain management with R hip   Objective Measure 3   Objective Measure Hip outcome tool   Details improved from 9-83%   Objective Measure 4   Objective Measure MMT   Details L hip flex 4+/5, L hip abd 4/5, L hip ext 4-/5   Treatment Interventions (PT)   Interventions Therapeutic Procedure/Exercise;Neuromuscular Re-education;Manual Therapy;Therapeutic Activity   Therapeutic Procedure/Exercise   Therapeutic Procedures: strength, endurance, ROM, flexibillity minutes (99117) 18   Therapeutic Procedures Ther Proc 2;Ther Proc 3;Ther Proc 4;Ther Proc 5;Ther Proc 6   Ther Proc 1 NuStep   Ther Proc 1 - Details Resistance 5, Seat 10 x6 minutes   Ther Proc 2 sdly hip abd   Ther Proc 2 - Details 2 x 15   Ther Proc 3 updated ptrx with pt present   Ther Proc 5 prone quad/hip flexor stretch   Ther Proc 5 - Details manual 3 x 20\"   Ther Proc 6 Leg Press   Ther Proc 6 - Details 88# x20   Ther Proc 7 Prone Hip Extension   Ther Proc 7 - Details 2 x 15   Skilled Intervention Progressed strengthening per pt tolerance.   Patient Response/Progress Pt had difficulty having all weight on L today.   Therapeutic Activity   Therapeutic Activities: dynamic activities to improve functional performance minutes (55011) 12   Ther Act 1 Step Ups   Ther Act 1 - Details 8\" x 15   Skilled Intervention Selection of " "exercise based on patient's challenges at home.   Patient Response/Progress Pt demonstrated good control and fatigue with 7\" step today.   Ther Act 2 step fwd   Ther Act 2 - Details L LE 4\" x 15 -challenged   Ther Act 3 supported knee bends   Ther Act 3 - Details 15x   Therapeutic Activities Ther Act 2;Ther Act 3   Neuromuscular Re-education   Neuromuscular re-ed of mvmt, balance, coord, kinesthetic sense, posture, proprioception minutes (15047) 8   Neuro Re-ed 1 SLS   Neuro Re-ed 1 - Details L LE 10-15\" with mirror feedback fo neutral postrue   Neuro Re-ed 2 Bridges   Neuro Re-ed 2 - Details With posterior pelvic tilt to decrease hamstring activation x15   Neuro Re-ed 3 gait drills   Neuro Re-ed 3 - Details marching, bkwds walking, side stepping   Education   Learner/Method Patient;Listening;Reading;Demonstration;Pictures/Video;No Barriers to Learning   Plan   Home program See PTRx   Updates to plan of care DC   Comments   Comments PN written in collaboration with Amanda Hilligoss, PT   Total Session Time   Timed Code Treatment Minutes 38   Total Treatment Time (sum of timed and untimed services) 38     "

## 2024-01-08 ASSESSMENT — HOOS JR
GOING UP OR DOWN STAIRS: MILD
SITTING: MILD
BENDING TO THE FLOOR TO PICK UP OBJECT: MILD
WALKING ON UNEVEN SURFACE: MILD
HOOS JR TOTAL INTERVAL SCORE: 76.78

## 2024-01-09 ENCOUNTER — OFFICE VISIT (OUTPATIENT)
Dept: ORTHOPEDICS | Facility: CLINIC | Age: 69
End: 2024-01-09
Payer: COMMERCIAL

## 2024-01-09 ENCOUNTER — TELEPHONE (OUTPATIENT)
Dept: ORTHOPEDICS | Facility: CLINIC | Age: 69
End: 2024-01-09

## 2024-01-09 DIAGNOSIS — G89.29 CHRONIC RIGHT HIP PAIN: Primary | ICD-10-CM

## 2024-01-09 DIAGNOSIS — M25.551 CHRONIC RIGHT HIP PAIN: Primary | ICD-10-CM

## 2024-01-09 PROCEDURE — 99024 POSTOP FOLLOW-UP VISIT: CPT | Performed by: ORTHOPAEDIC SURGERY

## 2024-01-09 ASSESSMENT — PAIN SCALES - GENERAL: PAINLEVEL: MILD PAIN (2)

## 2024-01-09 NOTE — TELEPHONE ENCOUNTER
Procedure: RTHA  Facility: [facility]  Length: 120 minutes  Anesthesia: Choice  Post-op appointments needed: 2 weeks provider only, 6 weeks with provider only.  Surgery packet/instructions given to patient?  Yes     Pre-Operative Teaching Flowsheet     Person(s) involved in teaching: Patient     Motivation Level:  Receptive (willing/able to accept information) and asks appropriate questions where applicable: Yes  Any cultural factors/Jain beliefs that may influence understanding or compliance? No     Patient demonstrates understanding of the following:  Pre-operative planning, including the necessary appointments and preparation needed prior to surgery: Yes  Which situations necessitate calling provider and whom to contact: Yes  Pain management techniques pre and post op: Yes  How, and when, to access community resources: Yes    Who will drive and stay/ with patient after surgery: Daughter/ wife  Pre-op exam PCP  PT to be completed at .         Additional Teaching Concerns Addressed:   Post-operative living arrangements and necessary adaptations to living environment.     Instructional Materials Used/Given: Yes, pre-op packet given including forms for Your surgery day, preparing for surgery, showering before surgery, Stop light tool introduced, Opioid pain medication guideline, pre-op physical form, and map  Patient expressed understanding of all forms given, questions were answered and will review in more detail at home.     Time spent with patient: 20 minutes.

## 2024-01-09 NOTE — LETTER
1/9/2024         RE: Aníbal Goldstein  [address] [address] [id number]        Dear Colleague,    Thank you for referring your patient, Aníbal Goldstein, to the [facility]. Please see a copy of my visit note below.    Chief Complaint: No chief complaint on file.         HPI: Aníbal Goldstein returns today in follow-up for his hips. He is now 6 weeks s/p left total hip. He reports that he is doing very well on the left. He reports that he needs no pain medication for the left hip and he does not need a cane for the left. He reports, however, that the right side is painful, limiting his activities of daily living, and that he is using a cane now to assist with right hip symptoms. He has known, Tonnis 3 right hip osteoarthritis. Was discharged from physical therapy with goals met on the left and no improvement on the right. He reports that he would like to consider going forward with total hip on the right.     Current Status:  HOOS Jr: 76.78 for the left   UCLA: 3 limited now byt the right hip.  Global Mental Health Score - (P) 18  Global Physical Health Score - (P) 15  PROMIS TOTAL - SUBSCORES - (P) 33  Medications and allergies are documented in the EMR and have been reviewed.      Current Outpatient Medications:      acetaminophen (TYLENOL) 500 MG tablet, Take 500-1,000 mg by mouth every 6 hours as needed for mild pain, Disp: , Rfl:      aspirin (ASA) 325 MG EC tablet, Take 1 tablet (325 mg) by mouth daily, Disp: 30 tablet, Rfl: 0     aspirin (ASA) 325 MG EC tablet, Take 1 tablet (325 mg) by mouth daily, Disp: 90 tablet, Rfl: 3     atorvastatin (LIPITOR) 40 MG tablet, Take 1 tablet (40 mg) by mouth every evening, Disp: 90 tablet, Rfl: 3     ibuprofen (ADVIL/MOTRIN) 200 MG tablet, Take 400 mg by mouth every 4 hours as needed for pain, Disp: , Rfl:      lisinopril (ZESTRIL) 10 MG tablet, Take 0.5 tablets (5 mg) by mouth daily, Disp: 90 tablet, Rfl: 3     oxyCODONE  (ROXICODONE) 5 MG tablet, Take 1-2 tablets (5-10 mg) by mouth every 4 hours as needed for moderate to severe pain, Disp: 26 tablet, Rfl: 0    Current Facility-Administered Medications:      triamcinolone (KENALOG-40) injection 40 mg, 40 mg, , , Joe Kraus, , 40 mg at 08/25/23 1151     triamcinolone (KENALOG-40) injection 40 mg, 40 mg, , , Joe Kraus, DO, 40 mg at 08/25/23 1151    Allergies: No known allergies        Physical Exam:  On physical examination the patient appears the stated age, is in no acute distress, affect is appropriate, and breathing is non-labored.    There were no vitals taken for this visit.  There is no height or weight on file to calculate BMI.    Rises from chair: easily   Gait: a little antalgic with less time on the right   ROM:  left is fluid and painless, the right side is a little irritated with symptoms reproduced with FADIR  SCiatic motor function is normal and symmetric     Assessment: doing very well left total hip. Function is limited by right hip osteoarthritis. Discussed additional physical therapy or injections. He is not interested and I do not think that this is mandatory given his level of symptoms and radiographic change. All the patient's questions were answered to the best of my ability.    Plan: continue rehab left total hip and RTC in 6 weeks, sooner if issues. Appropriate for right total hip if he chooses to go forward with it.   No ref. provider found      Again, thank you for allowing me to participate in the care of your patient.        Sincerely,        Jac Man MD

## 2024-01-09 NOTE — NURSING NOTE
Aníbal Goldstein's chief complaint for this visit includes:  Chief Complaint   Patient presents with    Surgical Followup     6wk s/p [facility] 11/29/23       Referring Provider:  No referring provider defined for this encounter.    There were no vitals taken for this visit.  Mild Pain (2)   Global Mental Health Score: (P) 18  Global Physical Health Score: (P) 15  PROMIS TOTAL - SUBSCORES: (P) 33  UCLA: 5 - R hip is source of constraints now  ACOSTA Javed 76.78    Pain increases with: Abduction (R side)   Previous surgeries: [facility] 11/29/23  Previous injections within last 6 months: NO  Treatments done: PT,   Imaging completed: XR 12/20  Pain: 1/10  Concerns: L hip is great, now wants to discuss his R side.     Wood Roth, ATC

## 2024-01-09 NOTE — PROGRESS NOTES
Chief Complaint: No chief complaint on file.         HPI: Aníbal Goldstein returns today in follow-up for his hips. He is now 6 weeks s/p left total hip. He reports that he is doing very well on the left. He reports that he needs no pain medication for the left hip and he does not need a cane for the left. He reports, however, that the right side is painful, limiting his activities of daily living, and that he is using a cane now to assist with right hip symptoms. He has known, Tonnis 3 right hip osteoarthritis. Was discharged from physical therapy with goals met on the left and no improvement on the right. He reports that he would like to consider going forward with total hip on the right.     Current Status:  HOOS Jr: 76.78 for the left   UCLA: 3 limited now byt the right hip.  Global Mental Health Score - (P) 18  Global Physical Health Score - (P) 15  PROMIS TOTAL - SUBSCORES - (P) 33  Medications and allergies are documented in the EMR and have been reviewed.      Current Outpatient Medications:     acetaminophen (TYLENOL) 500 MG tablet, Take 500-1,000 mg by mouth every 6 hours as needed for mild pain, Disp: , Rfl:     aspirin (ASA) 325 MG EC tablet, Take 1 tablet (325 mg) by mouth daily, Disp: 30 tablet, Rfl: 0    aspirin (ASA) 325 MG EC tablet, Take 1 tablet (325 mg) by mouth daily, Disp: 90 tablet, Rfl: 3    atorvastatin (LIPITOR) 40 MG tablet, Take 1 tablet (40 mg) by mouth every evening, Disp: 90 tablet, Rfl: 3    ibuprofen (ADVIL/MOTRIN) 200 MG tablet, Take 400 mg by mouth every 4 hours as needed for pain, Disp: , Rfl:     lisinopril (ZESTRIL) 10 MG tablet, Take 0.5 tablets (5 mg) by mouth daily, Disp: 90 tablet, Rfl: 3    oxyCODONE (ROXICODONE) 5 MG tablet, Take 1-2 tablets (5-10 mg) by mouth every 4 hours as needed for moderate to severe pain, Disp: 26 tablet, Rfl: 0    Current Facility-Administered Medications:     triamcinolone (KENALOG-40) injection 40 mg, 40 mg, , , Joe Kraus DO, 40 mg at  08/25/23 1151    triamcinolone (KENALOG-40) injection 40 mg, 40 mg, , , Joe Kraus, DO, 40 mg at 08/25/23 1151    Allergies: No known allergies        Physical Exam:  On physical examination the patient appears the stated age, is in no acute distress, affect is appropriate, and breathing is non-labored.    There were no vitals taken for this visit.  There is no height or weight on file to calculate BMI.    Rises from chair: easily   Gait: a little antalgic with less time on the right   ROM:  left is fluid and painless, the right side is a little irritated with symptoms reproduced with FADIR  SCiatic motor function is normal and symmetric     Assessment: doing very well left total hip. Function is limited by right hip osteoarthritis. Discussed additional physical therapy or injections. He is not interested and I do not think that this is mandatory given his level of symptoms and radiographic change. All the patient's questions were answered to the best of my ability.    Plan: continue rehab left total hip and RTC in 6 weeks, sooner if issues. Appropriate for right total hip if he chooses to go forward with it.   No ref. provider found

## 2024-01-12 NOTE — TELEPHONE ENCOUNTER
Date Scheduled: 2/12/24  Facility: Surgery Locations: [facility]  Surgeon: Dr. Man   Post-op appointment scheduled:    scheduled?: No  Surgery packet/instructions confirmed received?  Yes  Pre op physical/PAC appointment: 1/17 [facility]  Special Considerations:     Amena Antonio  Surgery Scheduling Coordinator  Ph: [phone]

## 2024-01-17 ENCOUNTER — OFFICE VISIT (OUTPATIENT)
Dept: FAMILY MEDICINE | Facility: CLINIC | Age: 69
End: 2024-01-17
Payer: COMMERCIAL

## 2024-01-17 VITALS
SYSTOLIC BLOOD PRESSURE: 128 MMHG | TEMPERATURE: 98.2 F | HEART RATE: 78 BPM | WEIGHT: 216 LBS | RESPIRATION RATE: 16 BRPM | BODY MASS INDEX: 35.05 KG/M2 | DIASTOLIC BLOOD PRESSURE: 78 MMHG | OXYGEN SATURATION: 96 %

## 2024-01-17 DIAGNOSIS — I10 BENIGN ESSENTIAL HYPERTENSION: Chronic | ICD-10-CM

## 2024-01-17 DIAGNOSIS — G89.29 CHRONIC RIGHT HIP PAIN: ICD-10-CM

## 2024-01-17 DIAGNOSIS — M25.551 CHRONIC RIGHT HIP PAIN: ICD-10-CM

## 2024-01-17 DIAGNOSIS — E66.01 CLASS 2 SEVERE OBESITY DUE TO EXCESS CALORIES WITH SERIOUS COMORBIDITY AND BODY MASS INDEX (BMI) OF 35.0 TO 35.9 IN ADULT (H): ICD-10-CM

## 2024-01-17 DIAGNOSIS — E66.812 CLASS 2 SEVERE OBESITY DUE TO EXCESS CALORIES WITH SERIOUS COMORBIDITY AND BODY MASS INDEX (BMI) OF 35.0 TO 35.9 IN ADULT (H): ICD-10-CM

## 2024-01-17 DIAGNOSIS — R73.03 PREDIABETES: ICD-10-CM

## 2024-01-17 DIAGNOSIS — E78.2 MIXED HYPERLIPIDEMIA: Chronic | ICD-10-CM

## 2024-01-17 DIAGNOSIS — Z01.818 PRE-OPERATIVE EXAMINATION: Primary | ICD-10-CM

## 2024-01-17 PROBLEM — U07.1 INFECTION DUE TO 2019 NOVEL CORONAVIRUS: Status: RESOLVED | Noted: 2022-10-03 | Resolved: 2024-01-17

## 2024-01-17 LAB
ERYTHROCYTE [DISTWIDTH] IN BLOOD BY AUTOMATED COUNT: 12.8 % (ref 10–15)
HCT VFR BLD AUTO: 41.6 % (ref 40–53)
HGB BLD-MCNC: 13.8 G/DL (ref 13.3–17.7)
MCH RBC QN AUTO: 30.9 PG (ref 26.5–33)
MCHC RBC AUTO-ENTMCNC: 33.2 G/DL (ref 31.5–36.5)
MCV RBC AUTO: 93 FL (ref 78–100)
PLATELET # BLD AUTO: 262 10E3/UL (ref 150–450)
RBC # BLD AUTO: 4.47 10E6/UL (ref 4.4–5.9)
WBC # BLD AUTO: 5.5 10E3/UL (ref 4–11)

## 2024-01-17 PROCEDURE — 80048 BASIC METABOLIC PNL TOTAL CA: CPT | Performed by: FAMILY MEDICINE

## 2024-01-17 PROCEDURE — 99214 OFFICE O/P EST MOD 30 MIN: CPT | Mod: 24 | Performed by: FAMILY MEDICINE

## 2024-01-17 PROCEDURE — 85027 COMPLETE CBC AUTOMATED: CPT | Performed by: FAMILY MEDICINE

## 2024-01-17 PROCEDURE — 36415 COLL VENOUS BLD VENIPUNCTURE: CPT | Performed by: FAMILY MEDICINE

## 2024-01-17 RX ORDER — ACETAMINOPHEN 500 MG
500-1000 TABLET ORAL EVERY 6 HOURS PRN
COMMUNITY
Start: 2024-01-17

## 2024-01-17 ASSESSMENT — PAIN SCALES - GENERAL: PAINLEVEL: NO PAIN (1)

## 2024-01-17 NOTE — RESULT ENCOUNTER NOTE
Please inform of results if patient has not viewed in [employer] within 3 business days.    CBC Results - Your cell counts were normal.    Please call the clinic with any questions you may have.     Have a great day,    Dr. Garcia

## 2024-01-17 NOTE — PATIENT INSTRUCTIONS
Important Takeaway Points From This Visit:  Hold your lisinopril the day of surgery.      As always, please call with any questions or concerns. I look forward to seeing you again soon!    Take care,  Dr. Navarrete    Your current medication list is printed. Please keep this with you - it is helpful to bring this current list to any other medical appointments. It can also be helpful if you ever go to the emergency room or hospital.    If you had lab testing today we will call you with the results. The phone number we will call with your results is # [phone] (home) [phone] (work). If this is not the best number please call our clinic and change the number.    If you need any refills, please call your pharmacy and they will contact us.    If you have any further concerns or wish to schedule another appointment, please call our office at [phone].    If you have a medical emergency, please call [phone].    Thank you for coming to [facility]!

## 2024-01-17 NOTE — PROGRESS NOTES
Preoperative Evaluation  [facility] [place]  [address], [address]  [place] [address]  Phone: [phone]  Fax: [phone]  Primary Provider: Tata Navarrete  Pre-op Performing Provider: TATA NAVARRETE  Jan 17, 2024     Aníbal is a 68 year old, presenting for the following:  Pre-Op Exam      Surgical Information  Surgery/Procedure: ARTHROPLASTY, HIP, TOTAL RIGHT   Surgery Location: [facility]  Surgeon: Dr. Jac Man  Surgery Date: 2/12/24  Time of Surgery: 10:05 AM  Where patient plans to recover: At home with family  Fax number for surgical facility: Note does not need to be faxed, will be available electronically in Epic.    Assessment & Plan     The proposed surgical procedure is considered INTERMEDIATE risk.    1. Pre-operative examination  Medically optimized for proposed procedure.  May proceed as planned.  Up-to-date on tetanus.  Chronic conditions controlled.  Able to tolerate 4 METS.   - CBC with platelets  - Basic metabolic panel  (Ca, Cl, CO2, Creat, Gluc, K, Na, BUN)     2. Chronic right hip pain  Plan for procedure above.     3. Mixed hyperlipidemia  Continue statin.  - Lipid panel reflex to direct LDL Non-fasting     4. Prediabetes  Resolved. Back in the normal range per A1c when checked in November.   - Basic metabolic panel  (Ca, Cl, CO2, Creat, Gluc, K, Na, BUN)     5. Benign essential hypertension  Controlled. Hold lisinopril night before/morning of procedure.  - Basic metabolic panel  (Ca, Cl, CO2, Creat, Gluc, K, Na, BUN)    6. Class 2 severe obesity due to excess calories with serious comorbidity and body mass index (BMI) of 35.0 to 35.9 in adult (H)  Noted. Encourage diet and exercise changes for weight loss and overall health improvement.       - No identified additional risk factors other than previously addressed     Antiplatelet or Anticoagulation Medication Instructions:   - aspirin: Discontinue any aspirin use 7-10 days prior to procedure to  reduce bleeding risk. It should be resumed postoperatively.      Additional Medication Instructions:   - ACE/ARB: HOLD on day of surgery (minimum 11 hours for general anesthesia).   - Statins: Continue taking on the day of surgery.    - ibuprofen (Advil, Motrin): HOLD 1 day before surgery.      RECOMMENDATION:  APPROVAL GIVEN to proceed with proposed procedure, without further diagnostic evaluation.     Jaime Navarrete MD  [facility]    Subjective       HPI related to upcoming procedure: Hx of bilateral osteoarthritis. S/p left hip replacement. Plan for right.     Hx of pre-diabetes.     Hx of HTN controlled with lisinopril.     Not currently taking aspirin or ibuprofen.        1/13/2024     9:10 AM   Preop Questions   1. Have you ever had a heart attack or stroke? No   2. Have you ever had surgery on your heart or blood vessels, such as a stent placement, a coronary artery bypass, or surgery on an artery in your head, neck, heart, or legs? No   3. Do you have chest pain with activity? No   4. Do you have a history of  heart failure? No   5. Do you currently have a cold, bronchitis or symptoms of other infection? No   6. Do you have a cough, shortness of breath, or wheezing? No   7. Do you or anyone in your family have previous history of blood clots? No   8. Do you or does anyone in your family have a serious bleeding problem such as prolonged bleeding following surgeries or cuts? No   9. Have you ever had problems with anemia or been told to take iron pills? No   10. Have you had any abnormal blood loss such as black, tarry or bloody stools? No   11. Have you ever had a blood transfusion? No   12. Are you willing to have a blood transfusion if it is medically needed before, during, or after your surgery? Yes   13. Have you or any of your relatives ever had problems with anesthesia? No   14. Do you have sleep apnea, excessive snoring or daytime drowsiness? No   15. Do you have  any artifical heart valves or other implanted medical devices like a pacemaker, defibrillator, or continuous glucose monitor? No   16. Do you have artificial joints? YES - left hip   17. Are you allergic to latex? No       Health Care Directive  Patient does not have a Health Care Directive or Living Will: Sheila Goldstein - Spouse - [phone]     Preoperative Review of    reviewed - no record of controlled substances prescribed.    Status of Chronic Conditions:  See problem list for active medical problems.  Problems all longstanding and stable, except as noted/documented.  See ROS for pertinent symptoms related to these conditions.    Patient Active Problem List    Diagnosis Date Noted    Class 2 severe obesity due to excess calories with serious comorbidity in adult (H) 01/17/2024     Priority: Medium    Chronic right hip pain 01/09/2024     Priority: Medium    Status post total replacement of left hip 12/06/2023     Priority: Medium    Abnormal gait 12/06/2023     Priority: Medium    Prediabetes 10/14/2022     Priority: Medium    Mixed hyperlipidemia 10/06/2022     Priority: Medium    Benign essential hypertension 10/06/2022     Priority: Medium    Transient visual loss, right 10/05/2022     Priority: Medium      Past Medical History:   Diagnosis Date    Cervicalgia 01/2008    Hypertension 10-6-22    Pain in joint, shoulder region 01/2008     Past Surgical History:   Procedure Laterality Date    ABDOMEN SURGERY  1991    Hernia repair    ARTHROPLASTY HIP Left 11/29/2023    Procedure: Left total hip arthroplasty;  Surgeon: Jac Man MD;  Location: [facility]    COLONOSCOPY      COLONOSCOPY WITH CO2 INSUFFLATION N/A 05/17/2021    Procedure: COLONOSCOPY, WITH CO2 INSUFFLATION;  Surgeon: Elder Bates DO;  Location: [facility] OR    HC KNEE SCOPE,MED/LAT MENISECTOMY  02/09/2009    Right knee    HERNIA REPAIR, INGUINAL RT/LT      lt ?    RELEASE CARPAL TUNNEL Right 03/01/2018    Procedure: RELEASE CARPAL TUNNEL;  " Right carpal tunnel release;  Surgeon: Britney Delgado MD;  Location: [facility] OR    ROTATOR CUFF REPAIR RT/LT  02/18/2010    Right side. Done at [facility].     Current Outpatient Medications   Medication Sig Dispense Refill    acetaminophen (TYLENOL) 500 MG tablet Take 1-2 tablets (500-1,000 mg) by mouth every 6 hours as needed for mild pain      atorvastatin (LIPITOR) 40 MG tablet Take 1 tablet (40 mg) by mouth every evening 90 tablet 3    lisinopril (ZESTRIL) 10 MG tablet Take 0.5 tablets (5 mg) by mouth daily 90 tablet 3       Allergies   Allergen Reactions    No Known Allergies         Social History     Tobacco Use    Smoking status: Never    Smokeless tobacco: Never   Substance Use Topics    Alcohol use: Not Currently     Alcohol/week: 1.7 standard drinks of alcohol     Types: 2 Standard drinks or equivalent per week     Comment: 0-1 per week      Family History   Problem Relation Age of Onset    Diabetes Father         Adult onset    Alzheimer Disease Father     Alzheimer Disease Mother     Colon Cancer Mother     Colon Cancer Maternal Grandmother     Prostate Cancer No family hx of     Breast Cancer No family hx of     Ulcerative Colitis No family hx of     Crohn's Disease No family hx of      History   Drug Use Unknown         Objective    /78   Pulse 78   Temp 98.2  F (36.8  C) (Temporal)   Resp 16   Wt 98 kg (216 lb)   SpO2 96%   BMI 35.05 kg/m     Estimated body mass index is 35.05 kg/m  as calculated from the following:    Height as of 11/29/23: 1.672 m (5' 5.83\").    Weight as of this encounter: 98 kg (216 lb).  Review of Systems  Constitutional, neuro, ENT, endocrine, pulmonary, cardiac, gastrointestinal, genitourinary, musculoskeletal, integument and psychiatric systems are negative, except as otherwise noted.  Physical Exam  GENERAL: alert and no distress  EYES: Eyes grossly normal to inspection, PERRL and conjunctivae and sclerae normal  HENT: ear canals and TM's normal, " nose and mouth without ulcers or lesions  NECK: no adenopathy, no asymmetry, masses, or scars  RESP: lungs clear to auscultation - no rales, rhonchi or wheezes  CV: regular rate and rhythm, normal S1 S2, no S3 or S4, no murmur, click or rub, no peripheral edema  ABDOMEN: soft, nontender, no hepatosplenomegaly, no masses and bowel sounds normal  MS: no gross musculoskeletal defects noted, no edema  SKIN: no suspicious lesions or rashes  NEURO: Normal strength and tone, mentation intact and speech normal  PSYCH: mentation appears normal, affect normal/bright  LYMPH: no cervical, supraclavicular, axillary, or inguinal adenopathy    Recent Labs   Lab Test 11/10/23  1639 10/06/22  0622 10/05/22  2129 10/04/22  1627   HGB 14.5 14.6  --  15.0    218  --  211   INR  --   --   --  0.99    138  --  137   POTASSIUM 4.0 3.9  --  3.9   CR 0.82 0.79  --  0.77   A1C 5.5  --  5.9*  --         Diagnostics  Labs pending at this time.  Results will be reviewed when available.   No EKG required, no history of coronary heart disease, significant arrhythmia, peripheral arterial disease or other structural heart disease.    Revised Cardiac Risk Index (RCRI)  The patient has the following serious cardiovascular risks for perioperative complications:   - No serious cardiac risks = 0 points     RCRI Interpretation: 0 points: Class I (very low risk - 0.4% complication rate)         Signed Electronically by: Jaime Navarrete MD  Copy of this evaluation report is provided to requesting physician.

## 2024-01-18 LAB
ANION GAP SERPL CALCULATED.3IONS-SCNC: 11 MMOL/L (ref 7–15)
BUN SERPL-MCNC: 18 MG/DL (ref 8–23)
CALCIUM SERPL-MCNC: 9.2 MG/DL (ref 8.8–10.2)
CHLORIDE SERPL-SCNC: 104 MMOL/L (ref 98–107)
CREAT SERPL-MCNC: 0.87 MG/DL (ref 0.67–1.17)
DEPRECATED HCO3 PLAS-SCNC: 23 MMOL/L (ref 22–29)
EGFRCR SERPLBLD CKD-EPI 2021: >90 ML/MIN/1.73M2
GLUCOSE SERPL-MCNC: 99 MG/DL (ref 70–99)
POTASSIUM SERPL-SCNC: 4.5 MMOL/L (ref 3.4–5.3)
SODIUM SERPL-SCNC: 138 MMOL/L (ref 135–145)

## 2024-01-19 NOTE — RESULT ENCOUNTER NOTE
Please inform of results if patient has not viewed in [facility] within 3 business days.    BMP - Your blood sugar was normal. Your kidney function and electrolytes were normal.    Please call the clinic with any questions you may have.     Have a great day,    Dr. Garcia

## 2024-02-08 ENCOUNTER — ANESTHESIA EVENT (OUTPATIENT)
Dept: SURGERY | Facility: AMBULATORY SURGERY CENTER | Age: 69
End: 2024-02-08
Payer: COMMERCIAL

## 2024-02-09 NOTE — ANESTHESIA PREPROCEDURE EVALUATION
Anesthesia Pre-Procedure Evaluation    Patient: Aníbal Goldstein   MRN: [id number] : 1955        Procedure : Procedure(s):  ARTHROPLASTY, HIP, TOTAL RIGHT          Past Medical History:   Diagnosis Date    Cervicalgia 2008    Hypertension 10-6-22    Pain in joint, shoulder region 2008      Past Surgical History:   Procedure Laterality Date    ABDOMEN SURGERY      Hernia repair    ARTHROPLASTY HIP Left 2023    Procedure: Left total hip arthroplasty;  Surgeon: Jac Man MD;  [facility]    COLONOSCOPY      COLONOSCOPY WITH CO2 INSUFFLATION N/A 2021    Procedure: COLONOSCOPY, WITH CO2 INSUFFLATION;  Surgeon: Elder Bates DO;  Location: [facility]    HC KNEE SCOPE,MED/LAT MENISECTOMY  2009    Right knee    HERNIA REPAIR, INGUINAL RT/LT      lt ?    RELEASE CARPAL TUNNEL Right 2018    Procedure: RELEASE CARPAL TUNNEL;  Right carpal tunnel release;  Surgeon: Britney Delgado MD;  Location: [facility] OR    ROTATOR CUFF REPAIR RT/LT  2010    Right side. Done at [facility].      Allergies   Allergen Reactions    No Known Allergies       Social History     Tobacco Use    Smoking status: Never    Smokeless tobacco: Never   Substance Use Topics    Alcohol use: Not Currently     Alcohol/week: 1.7 standard drinks of alcohol     Types: 2 Standard drinks or equivalent per week     Comment: 0-1 per week       Wt Readings from Last 1 Encounters:   24 98 kg (216 lb)        Anesthesia Evaluation   Pt has had prior anesthetic. Type of anesthetic: spinal.    No history of anesthetic complications       ROS/MED HX  ENT/Pulmonary:  - neg pulmonary ROS     Neurologic:  - neg neurologic ROS     Cardiovascular:     (+) Dyslipidemia hypertension- -   -  - -                                      METS/Exercise Tolerance: >4 METS    Hematologic:       Musculoskeletal:       GI/Hepatic:  - neg GI/hepatic ROS     Renal/Genitourinary:  - neg Renal ROS     Endo: Comment: Pre  "DM    (+)               Obesity,       Psychiatric/Substance Use:  - neg psychiatric ROS     Infectious Disease:       Malignancy:  - neg malignancy ROS     Other:            Physical Exam    Airway        Mallampati: III   TM distance: > 3 FB   Neck ROM: full   Mouth opening: > 3 cm    Respiratory Devices and Support         Dental       (+) Minor Abnormalities - some fillings, tiny chips      Cardiovascular   cardiovascular exam normal          Pulmonary   pulmonary exam normal                OUTSIDE LABS:  CBC:   Lab Results   Component Value Date    WBC 5.5 01/17/2024    WBC 6.7 11/10/2023    HGB 13.8 01/17/2024    HGB 14.5 11/10/2023    HCT 41.6 01/17/2024    HCT 41.8 11/10/2023     01/17/2024     11/10/2023     BMP:   Lab Results   Component Value Date     01/17/2024     11/10/2023    POTASSIUM 4.5 01/17/2024    POTASSIUM 4.0 11/10/2023    CHLORIDE 104 01/17/2024    CHLORIDE 104 11/10/2023    CO2 23 01/17/2024    CO2 21 (L) 11/10/2023    BUN 18.0 01/17/2024    BUN 19.6 11/10/2023    CR 0.87 01/17/2024    CR 0.82 11/10/2023    GLC 99 01/17/2024    GLC 77 11/10/2023     COAGS:   Lab Results   Component Value Date    PTT 26 10/04/2022    INR 0.99 10/04/2022     POC: No results found for: \"BGM\", \"HCG\", \"HCGS\"  HEPATIC: No results found for: \"ALBUMIN\", \"PROTTOTAL\", \"ALT\", \"AST\", \"GGT\", \"ALKPHOS\", \"BILITOTAL\", \"BILIDIRECT\", \"DERIK\"  OTHER:   Lab Results   Component Value Date    A1C 5.5 11/10/2023    OMER 9.2 01/17/2024    CRP 13.2 (H) 10/04/2022    SED 10 10/04/2022       Anesthesia Plan    ASA Status:  2    NPO Status:  NPO Appropriate    Anesthesia Type: Spinal.   Induction: Intravenous, Propofol.   Maintenance: Balanced.        Consents    Anesthesia Plan(s) and associated risks, benefits, and realistic alternatives discussed. Questions answered and patient/representative(s) expressed understanding.     - Discussed:     - Discussed with:  Patient      - Extended Intubation/Ventilatory " Support Discussed: No.      - Patient is DNR/DNI Status: No     Use of blood products discussed: No .     Postoperative Care    Pain management: IV analgesics, Oral pain medications, Multi-modal analgesia.   PONV prophylaxis: Dexamethasone or Solumedrol, Ondansetron (or other 5HT-3), Background Propofol Infusion     Comments:               Amanuel Pereira MD    I have reviewed the pertinent notes and labs in the chart from the past 30 days and (re)examined the patient.  Any updates or changes from those notes are reflected in this note.

## 2024-02-12 ENCOUNTER — ANESTHESIA (OUTPATIENT)
Dept: SURGERY | Facility: AMBULATORY SURGERY CENTER | Age: 69
End: 2024-02-12
Payer: COMMERCIAL

## 2024-02-12 ENCOUNTER — HOSPITAL ENCOUNTER (OUTPATIENT)
Facility: AMBULATORY SURGERY CENTER | Age: 69
Discharge: HOME | End: 2024-02-12
Attending: ORTHOPAEDIC SURGERY
Payer: COMMERCIAL

## 2024-02-12 VITALS
OXYGEN SATURATION: 98 % | DIASTOLIC BLOOD PRESSURE: 82 MMHG | HEIGHT: 66 IN | SYSTOLIC BLOOD PRESSURE: 140 MMHG | BODY MASS INDEX: 32.14 KG/M2 | RESPIRATION RATE: 20 BRPM | HEART RATE: 81 BPM | TEMPERATURE: 98 F | WEIGHT: 200 LBS

## 2024-02-12 DIAGNOSIS — G89.29 CHRONIC RIGHT HIP PAIN: Primary | ICD-10-CM

## 2024-02-12 DIAGNOSIS — M25.551 CHRONIC RIGHT HIP PAIN: Primary | ICD-10-CM

## 2024-02-12 PROCEDURE — 27130 TOTAL HIP ARTHROPLASTY: CPT | Mod: RT

## 2024-02-12 DEVICE — IMPLANTABLE DEVICE: Type: IMPLANTABLE DEVICE | Site: HIP | Status: FUNCTIONAL

## 2024-02-12 DEVICE — IMP LINER S&N ACET R3 XLPE 36X48MM 0DEG 71339548: Type: IMPLANTABLE DEVICE | Site: HIP | Status: FUNCTIONAL

## 2024-02-12 DEVICE — IMP SHELL SNR ACET R3 3H 48MM 71335548: Type: IMPLANTABLE DEVICE | Site: HIP | Status: FUNCTIONAL

## 2024-02-12 DEVICE — IMP HEAD FEMORAL SNR COBALT 32MM +4 71303204: Type: IMPLANTABLE DEVICE | Site: HIP | Status: FUNCTIONAL

## 2024-02-12 DEVICE — IMP SCR ACET SNN SPHERICAL HEAD 6.5X35MM 71332535: Type: IMPLANTABLE DEVICE | Site: HIP | Status: FUNCTIONAL

## 2024-02-12 DEVICE — IMP SCR ACET SNN SPHERICAL HEAD 6.5X20MM 71332520: Type: IMPLANTABLE DEVICE | Site: HIP | Status: FUNCTIONAL

## 2024-02-12 RX ORDER — GLYCOPYRROLATE 0.2 MG/ML
INJECTION, SOLUTION INTRAMUSCULAR; INTRAVENOUS PRN
Status: DISCONTINUED | OUTPATIENT
Start: 2024-02-12 | End: 2024-02-12

## 2024-02-12 RX ORDER — OXYCODONE HYDROCHLORIDE 5 MG/1
5 TABLET ORAL
Status: DISCONTINUED | OUTPATIENT
Start: 2024-02-12 | End: 2024-02-13 | Stop reason: HOSPADM

## 2024-02-12 RX ORDER — EPHEDRINE SULFATE 50 MG/ML
INJECTION, SOLUTION INTRAMUSCULAR; INTRAVENOUS; SUBCUTANEOUS PRN
Status: DISCONTINUED | OUTPATIENT
Start: 2024-02-12 | End: 2024-02-12

## 2024-02-12 RX ORDER — ONDANSETRON 2 MG/ML
4 INJECTION INTRAMUSCULAR; INTRAVENOUS EVERY 30 MIN PRN
Status: DISCONTINUED | OUTPATIENT
Start: 2024-02-12 | End: 2024-02-13 | Stop reason: HOSPADM

## 2024-02-12 RX ORDER — ACETAMINOPHEN 325 MG/1
650 TABLET ORAL EVERY 4 HOURS PRN
Qty: 100 TABLET | Refills: 0 | Status: SHIPPED | OUTPATIENT
Start: 2024-02-12

## 2024-02-12 RX ORDER — HYDROMORPHONE HYDROCHLORIDE 1 MG/ML
0.4 INJECTION, SOLUTION INTRAMUSCULAR; INTRAVENOUS; SUBCUTANEOUS EVERY 5 MIN PRN
Status: DISCONTINUED | OUTPATIENT
Start: 2024-02-12 | End: 2024-02-12 | Stop reason: HOSPADM

## 2024-02-12 RX ORDER — OXYCODONE HYDROCHLORIDE 5 MG/1
5-10 TABLET ORAL EVERY 4 HOURS PRN
Qty: 26 TABLET | Refills: 0 | Status: SHIPPED | OUTPATIENT
Start: 2024-02-12

## 2024-02-12 RX ORDER — HYDRALAZINE HYDROCHLORIDE 20 MG/ML
2.5-5 INJECTION INTRAMUSCULAR; INTRAVENOUS EVERY 10 MIN PRN
Status: DISCONTINUED | OUTPATIENT
Start: 2024-02-12 | End: 2024-02-12 | Stop reason: HOSPADM

## 2024-02-12 RX ORDER — OXYCODONE HYDROCHLORIDE 5 MG/1
5 TABLET ORAL
Status: COMPLETED | OUTPATIENT
Start: 2024-02-12 | End: 2024-02-12

## 2024-02-12 RX ORDER — ONDANSETRON 2 MG/ML
INJECTION INTRAMUSCULAR; INTRAVENOUS PRN
Status: DISCONTINUED | OUTPATIENT
Start: 2024-02-12 | End: 2024-02-12

## 2024-02-12 RX ORDER — PROPOFOL 10 MG/ML
INJECTION, EMULSION INTRAVENOUS CONTINUOUS PRN
Status: DISCONTINUED | OUTPATIENT
Start: 2024-02-12 | End: 2024-02-12

## 2024-02-12 RX ORDER — SODIUM CHLORIDE, SODIUM LACTATE, POTASSIUM CHLORIDE, CALCIUM CHLORIDE 600; 310; 30; 20 MG/100ML; MG/100ML; MG/100ML; MG/100ML
INJECTION, SOLUTION INTRAVENOUS CONTINUOUS
Status: DISCONTINUED | OUTPATIENT
Start: 2024-02-12 | End: 2024-02-12 | Stop reason: HOSPADM

## 2024-02-12 RX ORDER — FENTANYL CITRATE 50 UG/ML
50 INJECTION, SOLUTION INTRAMUSCULAR; INTRAVENOUS EVERY 5 MIN PRN
Status: DISCONTINUED | OUTPATIENT
Start: 2024-02-12 | End: 2024-02-12 | Stop reason: HOSPADM

## 2024-02-12 RX ORDER — ASPIRIN 81 MG/1
81 TABLET ORAL 2 TIMES DAILY
Qty: 60 TABLET | Refills: 0 | Status: SHIPPED | OUTPATIENT
Start: 2024-02-12

## 2024-02-12 RX ORDER — SODIUM CHLORIDE, SODIUM LACTATE, POTASSIUM CHLORIDE, CALCIUM CHLORIDE 600; 310; 30; 20 MG/100ML; MG/100ML; MG/100ML; MG/100ML
INJECTION, SOLUTION INTRAVENOUS CONTINUOUS PRN
Status: DISCONTINUED | OUTPATIENT
Start: 2024-02-12 | End: 2024-02-12

## 2024-02-12 RX ORDER — ONDANSETRON 4 MG/1
4 TABLET, ORALLY DISINTEGRATING ORAL EVERY 30 MIN PRN
Status: DISCONTINUED | OUTPATIENT
Start: 2024-02-12 | End: 2024-02-13 | Stop reason: HOSPADM

## 2024-02-12 RX ORDER — LIDOCAINE 40 MG/G
CREAM TOPICAL
Status: DISCONTINUED | OUTPATIENT
Start: 2024-02-12 | End: 2024-02-12 | Stop reason: HOSPADM

## 2024-02-12 RX ORDER — CEFAZOLIN SODIUM 2 G/50ML
2 SOLUTION INTRAVENOUS
Status: COMPLETED | OUTPATIENT
Start: 2024-02-12 | End: 2024-02-12

## 2024-02-12 RX ORDER — ACETAMINOPHEN 325 MG/1
650 TABLET ORAL
Status: DISCONTINUED | OUTPATIENT
Start: 2024-02-12 | End: 2024-02-13 | Stop reason: HOSPADM

## 2024-02-12 RX ORDER — BUPIVACAINE HYDROCHLORIDE 7.5 MG/ML
INJECTION, SOLUTION INTRASPINAL
Status: COMPLETED | OUTPATIENT
Start: 2024-02-12 | End: 2024-02-12

## 2024-02-12 RX ORDER — LIDOCAINE HYDROCHLORIDE 20 MG/ML
INJECTION, SOLUTION INFILTRATION; PERINEURAL PRN
Status: DISCONTINUED | OUTPATIENT
Start: 2024-02-12 | End: 2024-02-12

## 2024-02-12 RX ORDER — DEXAMETHASONE SODIUM PHOSPHATE 4 MG/ML
INJECTION, SOLUTION INTRA-ARTICULAR; INTRALESIONAL; INTRAMUSCULAR; INTRAVENOUS; SOFT TISSUE PRN
Status: DISCONTINUED | OUTPATIENT
Start: 2024-02-12 | End: 2024-02-12

## 2024-02-12 RX ORDER — POLYETHYLENE GLYCOL 3350 17 G/17G
1 POWDER, FOR SOLUTION ORAL DAILY
Qty: 7 PACKET | Refills: 0 | Status: SHIPPED | OUTPATIENT
Start: 2024-02-12

## 2024-02-12 RX ORDER — ONDANSETRON 4 MG/1
4 TABLET, ORALLY DISINTEGRATING ORAL
Status: DISCONTINUED | OUTPATIENT
Start: 2024-02-12 | End: 2024-02-13 | Stop reason: HOSPADM

## 2024-02-12 RX ORDER — OXYCODONE HYDROCHLORIDE 5 MG/1
10 TABLET ORAL
Status: DISCONTINUED | OUTPATIENT
Start: 2024-02-12 | End: 2024-02-13 | Stop reason: HOSPADM

## 2024-02-12 RX ORDER — CEPHALEXIN 500 MG/1
500 CAPSULE ORAL 2 TIMES DAILY
Qty: 3 CAPSULE | Refills: 0 | Status: SHIPPED | OUTPATIENT
Start: 2024-02-12 | End: 2024-02-14

## 2024-02-12 RX ORDER — AMOXICILLIN 250 MG
1-2 CAPSULE ORAL 2 TIMES DAILY
Qty: 30 TABLET | Refills: 0 | Status: SHIPPED | OUTPATIENT
Start: 2024-02-12

## 2024-02-12 RX ORDER — ONDANSETRON 4 MG/1
4 TABLET, ORALLY DISINTEGRATING ORAL EVERY 30 MIN PRN
Status: DISCONTINUED | OUTPATIENT
Start: 2024-02-12 | End: 2024-02-12 | Stop reason: HOSPADM

## 2024-02-12 RX ORDER — ACETAMINOPHEN 325 MG/1
975 TABLET ORAL ONCE
Status: DISCONTINUED | OUTPATIENT
Start: 2024-02-12 | End: 2024-02-12 | Stop reason: HOSPADM

## 2024-02-12 RX ORDER — ACETAMINOPHEN 325 MG/1
975 TABLET ORAL ONCE
Status: COMPLETED | OUTPATIENT
Start: 2024-02-12 | End: 2024-02-12

## 2024-02-12 RX ORDER — CEFAZOLIN SODIUM 2 G/50ML
2 SOLUTION INTRAVENOUS SEE ADMIN INSTRUCTIONS
Status: DISCONTINUED | OUTPATIENT
Start: 2024-02-12 | End: 2024-02-12 | Stop reason: HOSPADM

## 2024-02-12 RX ORDER — LABETALOL HYDROCHLORIDE 5 MG/ML
10 INJECTION, SOLUTION INTRAVENOUS
Status: DISCONTINUED | OUTPATIENT
Start: 2024-02-12 | End: 2024-02-12 | Stop reason: HOSPADM

## 2024-02-12 RX ORDER — ONDANSETRON 2 MG/ML
4 INJECTION INTRAMUSCULAR; INTRAVENOUS EVERY 30 MIN PRN
Status: DISCONTINUED | OUTPATIENT
Start: 2024-02-12 | End: 2024-02-12 | Stop reason: HOSPADM

## 2024-02-12 RX ORDER — HYDROMORPHONE HYDROCHLORIDE 1 MG/ML
0.2 INJECTION, SOLUTION INTRAMUSCULAR; INTRAVENOUS; SUBCUTANEOUS EVERY 5 MIN PRN
Status: DISCONTINUED | OUTPATIENT
Start: 2024-02-12 | End: 2024-02-12 | Stop reason: HOSPADM

## 2024-02-12 RX ORDER — FENTANYL CITRATE 50 UG/ML
25 INJECTION, SOLUTION INTRAMUSCULAR; INTRAVENOUS EVERY 5 MIN PRN
Status: DISCONTINUED | OUTPATIENT
Start: 2024-02-12 | End: 2024-02-12 | Stop reason: HOSPADM

## 2024-02-12 RX ADMIN — LIDOCAINE HYDROCHLORIDE 40 MG: 20 INJECTION, SOLUTION INFILTRATION; PERINEURAL at 10:58

## 2024-02-12 RX ADMIN — OXYCODONE HYDROCHLORIDE 5 MG: 5 TABLET ORAL at 13:57

## 2024-02-12 RX ADMIN — Medication 0.3 MCG/KG/MIN: at 11:02

## 2024-02-12 RX ADMIN — ACETAMINOPHEN 975 MG: 325 TABLET ORAL at 09:07

## 2024-02-12 RX ADMIN — CEFAZOLIN SODIUM 2 G: 2 SOLUTION INTRAVENOUS at 11:00

## 2024-02-12 RX ADMIN — Medication 100 MCG: at 10:58

## 2024-02-12 RX ADMIN — GLYCOPYRROLATE 0.2 MG: 0.2 INJECTION, SOLUTION INTRAMUSCULAR; INTRAVENOUS at 10:53

## 2024-02-12 RX ADMIN — Medication 0.2 MG: at 12:44

## 2024-02-12 RX ADMIN — ONDANSETRON 4 MG: 2 INJECTION INTRAMUSCULAR; INTRAVENOUS at 11:36

## 2024-02-12 RX ADMIN — SODIUM CHLORIDE, SODIUM LACTATE, POTASSIUM CHLORIDE, CALCIUM CHLORIDE: 600; 310; 30; 20 INJECTION, SOLUTION INTRAVENOUS at 09:13

## 2024-02-12 RX ADMIN — SODIUM CHLORIDE, SODIUM LACTATE, POTASSIUM CHLORIDE, CALCIUM CHLORIDE: 600; 310; 30; 20 INJECTION, SOLUTION INTRAVENOUS at 10:50

## 2024-02-12 RX ADMIN — DEXAMETHASONE SODIUM PHOSPHATE 4 MG: 4 INJECTION, SOLUTION INTRA-ARTICULAR; INTRALESIONAL; INTRAMUSCULAR; INTRAVENOUS; SOFT TISSUE at 11:18

## 2024-02-12 RX ADMIN — BUPIVACAINE HYDROCHLORIDE 1.4 ML: 7.5 INJECTION, SOLUTION INTRASPINAL at 10:58

## 2024-02-12 RX ADMIN — GLYCOPYRROLATE 0.2 MG: 0.2 INJECTION, SOLUTION INTRAMUSCULAR; INTRAVENOUS at 10:58

## 2024-02-12 RX ADMIN — EPHEDRINE SULFATE 5 MG: 50 INJECTION, SOLUTION INTRAMUSCULAR; INTRAVENOUS; SUBCUTANEOUS at 11:44

## 2024-02-12 RX ADMIN — PROPOFOL 100 MCG/KG/MIN: 10 INJECTION, EMULSION INTRAVENOUS at 10:58

## 2024-02-12 RX ADMIN — Medication 0.3 MG: at 12:40

## 2024-02-12 NOTE — OP NOTE
OPERATIVE REPORT    DATE OF SERVICE: 2/12/24    SURGEON: Jac Man MD.    ASSISTANT(S): CORONA Lopez MD    PREOPERATIVE DIAGNOSIS:  Osteoarthritis    POSTOPERATIVE DIAGNOSIS:  Osteoarthritis    OPERATION PERFORMED:  right total hip arthroplasty    IMPLANTS:    Implant Name Type Inv. Item Serial No.  Lot No. LRB No. Used Action   IMP LINER S&N ACET R3 XLPE [id number] [id number] - [id number] Total Joint Component/Insert IMP LINER S&N ACET R3 XLPE [id number] 0DEG [id number]  [employer] [id number] Right 1 Implanted   IMP SHELL SNR ACET R3 3H 48MM [id number] - [id number] Total Joint Component/Insert IMP SHELL SNR ACET R3 3H 48MM [id number]  [employer] [id number] Right 1 Implanted   IMP SCR ACET SNN SPHERICAL HEAD 6.5X20MM [id number] - [id number] Metallic Hardware/[place] IMP SCR ACET SNN SPHERICAL HEAD 6.5X20MM [id number]  [employer] [id number] Right 1 Implanted   IMP SCR ACET SNN SPHERICAL HEAD 6.5X35MM [id number] - [id number] Metallic Hardware/[place] IMP SCR ACET SNN SPHERICAL HEAD 6.5X35MM [id number]  [employer] [id number] Right 1 Implanted   IMP STEM FEM HIP SNN SYNERGY POROUS SZ 11 HO [id number] - [id number] Total Joint Component/Insert IMP STEM FEM HIP SNN SYNERGY POROUS SZ 11 HO [id number]  [employer] [id number] Right 1 Implanted   IMP HEAD FEMORAL SNR [place] 32MM +4 [id number] - [id number] Total Joint Component/Insert IMP HEAD FEMORAL SNR [place] 32MM +4 [id number]  [employer] [id number] Right 1 Implanted       ANESTHETIC: spinal    OPERATIVE FINDINGS:  End stage arthrosis of the hip    BLOOD LOSS about 150 cc    COMPLICATIONS:  None apparent    OPERATIVE INDICATIONS:  The patient has a long history of debilitating pain secondary to ostearthritis of the hip.  Despite comprehensive non-operative management these symptoms continued to interfere with activities of daily living.  After discussion of further treatment options including the risks and  benefits that patient elected to proceed with a total hip.    DESCRIPTION OF THE PROCEDURE:  The patient was identified in the preoperative holding area.  The consent form including the risks and benefits were reviewed with the patient.  The operative limb was identified and marked.  The patient was brought back to the operating room and placed supine on the operating table.  An anesthetic was induced by the anesthesia team.   The patient was placed in the lateral decubitus position and prepped and draped in the normal standard fashion for a hip replacement.  A time-out was called.  Antibiotics were given.  We utilized an approximately 15 cm curvilinear incision, centered on the vastus ridge, and performed a standard posterior approach to the hip.  The tensor fascia was split.  A small portion of gluteus scar was split in line with its fibers.  The sciatic nerve was palpated.  The east-west retractor was placed.  The posterior border of gluteus medius was exposed and retracted.  The tendon of piriformis and that of the obturators was released from their attachments.  A trapdoor posterior capsulotomy was performed.  The hip was dislocated.  The lesser trochanter was exposed.  A ruler was used to measure and electrocautery was used to levon our neck cut as preoperatively templated.  The head was measured with a caliper and found to be 44 mm.  This measurement was used to choose our first reamer.  The neck cut was re-measured. The femur was elevated.  A Hohmann was placed over the anterior rim of the acetabulum and the femur was subluxed anterior.  A split was made in the inferior capsule.  The transverse acetabular ligament was left intact and used a guide for the anterversion of the acetabular component.  Circumferential retractors were placed.  We began reaming and went up by two until sufficient contact was made with the acetabular rim.  We then went up by one millimeter for a one millimeter press-fit.  We were  "within one size of our preoperative plan.   A trail was placed.  It had an excellent press fit.  We then placed out final component in 40 degrees of inclination and approximately 20 degrees of anteversion, parallel to the transverse ligament.  The press fit was excellent.  Screws were placed for additional initial fixation.  A flat liner was then placed. It locked into place.  Attention was turned to the femur.  Retractors were placed to elevated the proximal femur and to protect the tendon of gluteus medius.  Remaining lateral neck was removed and the piriformis fossa was cleared of soft-tissue.  A box osteotome and canal finder were used to prepare for broaching.  A sharp broach was used to lateralize slightly.  We then reamed and broached up sequentially to a size  11.  It was rotationally stable and sat up 1-2 millimeters from the neck-cut.  Preoperatively the patient had templated to a high offset stem.  The high offset stem appropriately tensioned the abductors.  We trialed the following femoral heads: 0 and +4.  The +4 appropriately tensioned the abductors and clinically equalized the leg-lengths.  The stability exam was excellent.  The hip was stable and there was no impingement posteriorly with hyper-extension and maximal external rotation.  With full extension, the knee could be fixed to bring the foot nearly to the buttock.  With the hip in ninety degrees of flexion and neutral rotation there was greater than 60 degrees of internal rotation before subluxation.  There appropriate movement with a \"renato\" test.  Happy with our stability exam, the final implant was placed in approximately twenty degrees of anteversion.  It sat within 1 mm of the broach.  We then trailed with a +4  head.  The stability exam was identical.  We then placed the final head on a clean, dry neck and impacted it into place. The hip was reduced after directly visualizing the entire acetabulum.  The wound was then irrigated.  The " posterior capsule was repaired to the anterior capsule and and short external rotators were sutured to their anatomic attachment on the greater trochanter with non-absorbable suture through bone tunnels.The fascia was closed with interrupted Vicryl, the dermis with interrupted Vicryl, and skin with running monocryl, Dermabond and steri-strips.  At the end of the procedure the sponge and needle counts were correct times two.  The patient tolerated the procedure well and returned to the PAR extubated and stable.    POSTOPERATIVE PLAN:  1. Weight bearing as tolerated  2. Standard posterior hip precautions  3. DVT prophylaxis with ECASA  4. 24 hours of prophylactic antibiotics  5. Follow-up:  Wound clinic in 2 weeks and with Donovan in clinic in 6 weeks for x-rays and a rehabilitation check.

## 2024-02-12 NOTE — ANESTHESIA PROCEDURE NOTES
"Intrathecal injection Procedure Note    Pre-Procedure   Staff -        Anesthesiologist:  Amanuel Pereira MD       Resident/Fellow: Norman Ureña MD       Performed By: resident       Location: OR       Pre-Anesthestic Checklist: patient identified, IV checked, risks and benefits discussed, informed consent, monitors and equipment checked, pre-op evaluation, at physician/surgeon's request and post-op pain management  Timeout:       Correct Patient: Yes        Correct Procedure: Yes        Correct Site: Yes        Correct Position: Yes   Procedure Documentation  Procedure: intrathecal injection       Patient Position: sitting       Skin prep: Chloraprep       Insertion Site: L3-4. (midline approach).       Needle Gauge: 25.        Needle Length (Inches): 3.5        Spinal Needle Type: Pencan       Introducer used       Introducer: 20 G       # of attempts: 1 and  # of redirects:  1    Assessment/Narrative         Paresthesias: No.       CSF fluid: clear.    Medication(s) Administered   0.75% Hyperbaric Bupivacaine (Intrathecal) - Intrathecal   1.4 mL - 2/12/2024 10:58:00 AM    FOR [facility] ([facility]) ONLY:   Pain Team Contact information: please page the Pain Team Via [email or web address]. Search \"Pain\". During daytime hours, please page the attending first. At night please page the resident first.      "

## 2024-02-12 NOTE — DISCHARGE INSTRUCTIONS
"    Safety Tips for Using Crutches    Crutch Fit:  Assume good standing posture with shoulders relaxed and crutch tips 6-8 inches out from the side of the foot.  The underarm pad should fall 2-3 fingers width below the armpit.  The handgrip is positioned level with the wrist to allow 30  flexion at the elbow.    Safety Tips:  Bear weight on your hands, not on your armpits.  Do not add extra padding to the underarm pad. This will, in effect, lengthen the crutches and increase risk of nerve injury.  Wear flat, properly fitting shoes. Do not walk in stocking feet, high heels or slippers.  Household hazards:  --Throw rugs should be removed from floors.  --Stairs should be cleared of obstacles.  --Use extra caution on slippery, highly polished, littered or uneven floor surfaces.  --Check for electric cords.  Check crutch tips for excessive wear and keep wing nuts tight.  While walking, look forward with  head up  and  eyes open.  Take equal length steps.  Use BOTH crutches.    Stairs Sequence:  UP: \"Good\" leg first, followed by  bad  leg, then crutches.  DOWN: Crutches, followed by  bad  leg, \"good\" leg.     Walking with Crutches:  Move both crutches forward at the same time.  Non-Weight Bearing (NWB):  Hold the involved leg up and swing through the crutches with the involved leg. The involved leg does not touch the floor.  Toe Touch Weight Bearing (TTWB): Move the involved leg forward. Rest it lightly on the floor for balance only. Step through the crutches with the uninvolved leg.  Partial Weight Bearing (PWB): Move the involved leg forward. Step down the weight of the leg only.  Step through the crutches with the uninvolved leg.  Weight Bearing As Tolerated (WBAT): Move the involved leg forward. Put as much pressure through the involved leg as you can tolerate comfortably. Then step through the crutches with the uninvolved leg.      [facility]  Home Care Following Anesthesia  For " "24 hours after surgery:  Get plenty of rest.  A responsible adult must stay with you for at least 24 hours after you leave the surgery center.  Do not drive or use heavy equipment.  If you have weakness or tingling, don't drive or use heavy equipment until this feeling goes away.   Do not drink alcohol.   Avoid strenuous or risky activities.  Ask for help when climbing stairs.  You may feel lightheaded.  IF so, sit for a few minutes before standing.  Have someone help you get up.   If you have nausea (feel sick to your stomach): Drink only clear liquids such as apple juice, ginger ale, broth or 7-Up.  Rest may also help.  Be sure to drink enough fluids.  Move to a regular diet as you feel able.   You may have a slight fever.  Call the doctor if your fever is over 100 F (37.7 C) (taken under the tongue) or lasts longer than 24 hours.  You may have a dry mouth, a sore throat, muscle aches or trouble sleeping. These should go away after 24 hours.  Do not make important or legal decisions.   It is recommended to avoid smoking.        Today you received a Marcaine or bupivacaine block to numb the nerves near your surgery site.  This is a block using local anesthetic or \"numbing\" medication injected around the nerves to anesthetize or \"numb\" the area supplied by those nerves.  This block is injected into the muscle layer near your surgical site.  The medication may numb the location where you had surgery for 6-18 hours, but may last up to 24 hours.  If your surgical site is an arm or leg you should be careful with your affected limb, since it is possible to injure your limb without being aware of it due to the numbing.  Until full feeling returns, you should guard against bumping or hitting your limb, and avoid extreme hot or cold temperatures on the skin.  As the block wears off, the feeling will return as a tingling or prickly sensation near your surgical site.  You will experience more discomfort from your incision as " the feeling returns.  You may want to take a pain pill (a narcotic or Tylenol if this was prescribed by your surgeon) when you start to experience mild pain before the pain beccomes more severe.  If your pain medications do not control your pain you should notifiy your surgeon.    Tips for taking pain medications  To get the best pain relief possible, remember these points:  Take pain medications as directed, before pain becomes severe.  Pain medication can upset your stomach: taking it with food may help.  Constipation is a common side effect of pain medication. Drink plenty of  fluids.  Eat foods high in fiber. Take a stool softener if recommended by your doctor or pharmacist.  Do not drink alcohol, drive or operate machinery while taking pain medications.  Ask about other ways to control pain, such as with heat, ice or relaxation.    Tylenol/Acetaminophen Consumption    If you feel your pain relief is insufficient, you may take Tylenol/Acetaminophen in addition to your narcotic pain medication.   Be careful not to exceed 4,000 mg of Tylenol/Acetaminophen in a 24 hour period from all sources.  If you are taking extra strength Tylenol/acetaminophen (500 mg), the maximum dose is 8 tablets in 24 hours.  If you are taking regular strength acetaminophen (325 mg), the maximum dose is 12 tablets in 24 hours.  Tylenol 975 mg given at 9 am.  Next available dose at 3 pm.    Call a doctor for any of the following:  Signs of infection (fever, growing tenderness at the surgery site, a large amount of drainage or bleeding, severe pain, foul-smelling drainage, redness, swelling).  It has been over 8 to 10 hours since surgery and you are still not able to urinate (pass water).  Headache for over 24 hours.  Numbness, tingling or weakness the day after surgery (if you had spinal anesthesia).  Signs of Covid-19 infection (temperature over 100 degrees, shortness of breath, cough, loss of taste/smell, generalized body aches,  persistent headache, chills, sore throat, nausea/vomiting/diarrhea)  Your doctor is:       Dr. Jac Man, Orthopaedics: [phone]               Or dial [phone] and ask for the resident on call for:  Orthopaedics  For emergency care, call the:  [facility] Emergency Department: [phone] (TTY for hearing impaired: [phone])

## 2024-02-12 NOTE — ANESTHESIA CARE TRANSFER NOTE
Patient: Aníbal Goldstein    Procedure: Procedure(s):  ARTHROPLASTY, HIP, TOTAL RIGHT       Diagnosis: Chronic right hip pain [M25.551, G89.29]  Diagnosis Additional Information: No value filed.    Anesthesia Type:   Spinal     Note:      Level of Consciousness: awake  Oxygen Supplementation: face mask    Independent Airway: airway patency satisfactory and stable        Patient transferred to: PACU    Handoff Report: Identifed the Patient, Identified the Reponsible Provider, Reviewed the pertinent medical history, Discussed the surgical course, Reviewed Intra-OP anesthesia mangement and issues during anesthesia, Set expectations for post-procedure period and Allowed opportunity for questions and acknowledgement of understanding  Vitals:  Vitals Value Taken Time   BP 99/72 02/12/24 1326   Temp 36.2  C (97.1  F) 02/12/24 1326   Pulse 83 02/12/24 1329   Resp 17 02/12/24 1329   SpO2 97 % 02/12/24 1329   Vitals shown include unfiled device data.    Electronically Signed By: BRIAN Sow CRNA  February 12, 2024  1:30 PM

## 2024-02-12 NOTE — ANESTHESIA POSTPROCEDURE EVALUATION
Patient: Aníbal Goldstein    Procedure: Procedure(s):  ARTHROPLASTY, HIP, TOTAL RIGHT       Anesthesia Type:  Spinal    Note:  Disposition: Outpatient   Postop Pain Control: Uneventful            Sign Out: Well controlled pain   PONV: No   Neuro/Psych: Uneventful            Sign Out: Acceptable/Baseline neuro status   Airway/Respiratory: Uneventful            Sign Out: Acceptable/Baseline resp. status   CV/Hemodynamics: Uneventful            Sign Out: Acceptable CV status; No obvious hypovolemia; No obvious fluid overload   Other NRE: NONE   DID A NON-ROUTINE EVENT OCCUR? No           Last vitals:  Vitals Value Taken Time   /89 02/12/24 1400   Temp 36.6  C (97.8  F) 02/12/24 1400   Pulse 81 02/12/24 1400   Resp 11 02/12/24 1400   SpO2 97 % 02/12/24 1400       Electronically Signed By: Suad Marshall MD  February 12, 2024  4:19 PM

## 2024-02-18 ASSESSMENT — ACTIVITIES OF DAILY LIVING (ADL)
ROLLING OVER IN BED: EXTREME DIFFICULTY
WALKING_INITIALLY: MODERATE DIFFICULTY
GETTING_INTO_AND_OUT_OF_AN_AVERAGE_CAR: MODERATE DIFFICULTY
SPORTS_COUNT: 9
HEAVY_WORK: EXTREME DIFFICULTY
WALKING_UP_STEEP_HILLS: UNABLE TO DO
ABILITY_TO_PERFORM_ACTIVITY_WITH_YOUR_NORMAL_TECHNIQUE: UNABLE TO DO
LIGHT_TO_MODERATE_WORK: MODERATE DIFFICULTY
HOW_WOULD_YOU_RATE_YOUR_CURRENT_LEVEL_OF_FUNCTION_DURING_YOUR_USUAL_ACTIVITIES_OF_DAILY_LIVING_FROM_0_TO_100_WITH_100_BEING_YOUR_LEVEL_OF_FUNCTION_PRIOR_TO_YOUR_HIP_PROBLEM_AND_0_BEING_THE_INABILITY_TO_PERFORM_ANY_OF_YOUR_USUAL_DAILY_ACTIVITIES?: 10
TWISTING/PIVOTING_ON_INVOLVED_LEG: EXTREME DIFFICULTY
ADL_SCORE(%): 0
SITTING FOR 15 MINUTES: MODERATE DIFFICULTY
ADL_COUNT: 17
GOING DOWN 1 FLIGHT OF STAIRS: EXTREME DIFFICULTY
SITTING_FOR_15_MINUTES: MODERATE DIFFICULTY
STEPPING UP AND DOWN CURBS: MODERATE DIFFICULTY
GOING_UP_1_FLIGHT_OF_STAIRS: EXTREME DIFFICULTY
WALKING_DOWN_STEEP_HILLS: UNABLE TO DO
STARTING_AND_STOPPING_QUICKLY: UNABLE TO DO
ADL_TOTAL_ITEM_SCORE: 0
SPORTS_SCORE(%): 0
LOW_IMPACT_ACTIVITIES_LIKE_FAST_WALKING: UNABLE TO DO
TWISTING/PIVOTING ON INVOLVED LEG: EXTREME DIFFICULTY
STANDING FOR 15 MINUTES: EXTREME DIFFICULTY
GOING_DOWN_1_FLIGHT_OF_STAIRS: EXTREME DIFFICULTY
SPORTS_TOTAL_ITEM_SCORE: 0
WALKING_FOR_APPROXIMATELY_10_MINUTES: MODERATE DIFFICULTY
RUNNING_ONE_MILE: EXTREME DIFFICULTY
HEAVY_WORK: EXTREME DIFFICULTY
PUTTING_ON_SOCKS_AND_SHOES: UNABLE TO DO
WALKING_15_MINUTES_OR_GREATER: EXTREME DIFFICULTY
WALKING_INITIALLY: MODERATE DIFFICULTY
ABILITY_TO_PARTICIPATE_IN_YOUR_DESIRED_SPORT_AS_LONG_AS_YOU_WOULD_LIKE: UNABLE TO DO
PUTTING ON SOCKS AND SHOES: UNABLE TO DO
CUTTING/LATERAL_MOVEMENTS: UNABLE TO DO
HOS_ADL_ITEM_SCORE_TOTAL: 18
PLEASE_INDICATE_YOR_PRIMARY_REASON_FOR_REFERRAL_TO_THERAPY:: HIP
HOW_WOULD_YOU_RATE_YOUR_CURRENT_LEVEL_OF_FUNCTION_DURING_YOUR_SPORTS_RELATED_ACTIVITIES_FROM_0_TO_100_WITH_100_BEING_YOUR_LEVEL_OF_FUNCTION_PRIOR_TO_YOUR_HIP_PROBLEM_AND_0_BEING_THE_INABILITY_TO_PERFORM_ANY_OF_YOUR_USUAL_DAILY_ACTIVITIES?: 10
HOS_ADL_HIGHEST_POTENTIAL_SCORE: 68
HOW_WOULD_YOU_RATE_YOUR_CURRENT_LEVEL_OF_FUNCTION?: SEVERELY ABNORMAL
GETTING INTO AND OUT OF AN AVERAGE CAR: MODERATE DIFFICULTY
DEEP_SQUATTING: UNABLE TO DO
GETTING_INTO_AND_OUT_OF_A_BATHTUB: UNABLE TO DO
SPORTS_HIGHEST_POTENTIAL_SCORE: 36
RECREATIONAL ACTIVITIES: EXTREME DIFFICULTY
LIGHT_TO_MODERATE_WORK: MODERATE DIFFICULTY
HOS_ADL_SCORE(%): 26.47
GOING UP 1 FLIGHT OF STAIRS: EXTREME DIFFICULTY
ADL_HIGHEST_POTENTIAL_SCORE: 68
DEEP SQUATTING: UNABLE TO DO
JUMPING: EXTREME DIFFICULTY
ROLLING_OVER_IN_BED: EXTREME DIFFICULTY
HOW_WOULD_YOU_RATE_YOUR_CURRENT_LEVEL_OF_FUNCTION_DURING_YOUR_USUAL_ACTIVITIES_OF_DAILY_LIVING_FROM_0_TO_100_WITH_100_BEING_YOUR_LEVEL_OF_FUNCTION_PRIOR_TO_YOUR_HIP_PROBLEM_AND_0_BEING_THE_INABILITY_TO_PERFORM_ANY_OF_YOUR_USUAL_DAILY_ACTIVITIES?: 10
WALKING_APPROXIMATELY_10_MINUTES: MODERATE DIFFICULTY
WALKING_UP_STEEP_HILLS: UNABLE TO DO
LANDING: UNABLE TO DO
GETTING_INTO_AND_OUT_OF_A_BATHTUB: UNABLE TO DO
RECREATIONAL_ACTIVITIES: EXTREME DIFFICULTY
WALKING_15_MINUTES_OR_GREATER: EXTREME DIFFICULTY
STEPPING_UP_AND_DOWN_CURBS: MODERATE DIFFICULTY
SWINGING_OBJECTS_LIKE_A_GOLF_CLUB: EXTREME DIFFICULTY
WALKING_DOWN_STEEP_HILLS: UNABLE TO DO
STANDING_FOR_15_MINUTES: EXTREME DIFFICULTY

## 2024-02-19 ENCOUNTER — THERAPY VISIT (OUTPATIENT)
Dept: PHYSICAL THERAPY | Facility: CLINIC | Age: 69
End: 2024-02-19
Attending: ORTHOPAEDIC SURGERY
Payer: COMMERCIAL

## 2024-02-19 DIAGNOSIS — Z96.641 STATUS POST TOTAL REPLACEMENT OF RIGHT HIP: Primary | ICD-10-CM

## 2024-02-19 DIAGNOSIS — Z96.641 STATUS POST TOTAL REPLACEMENT OF RIGHT HIP: ICD-10-CM

## 2024-02-19 DIAGNOSIS — Z47.1 AFTERCARE FOLLOWING HIP JOINT REPLACEMENT SURGERY, UNSPECIFIED LATERALITY: ICD-10-CM

## 2024-02-19 DIAGNOSIS — Z96.649 AFTERCARE FOLLOWING HIP JOINT REPLACEMENT SURGERY, UNSPECIFIED LATERALITY: ICD-10-CM

## 2024-02-19 DIAGNOSIS — Z96.641 STATUS POST HIP REPLACEMENT, RIGHT: ICD-10-CM

## 2024-02-19 DIAGNOSIS — M25.551 HIP PAIN, RIGHT: Primary | ICD-10-CM

## 2024-02-19 PROCEDURE — 97161 PT EVAL LOW COMPLEX 20 MIN: CPT | Mod: GP | Performed by: PHYSICAL THERAPIST

## 2024-02-19 PROCEDURE — 97110 THERAPEUTIC EXERCISES: CPT | Mod: GP | Performed by: PHYSICAL THERAPIST

## 2024-02-19 PROCEDURE — 97140 MANUAL THERAPY 1/> REGIONS: CPT | Mod: GP | Performed by: PHYSICAL THERAPIST

## 2024-02-19 NOTE — PROGRESS NOTES
PHYSICAL THERAPY EVALUATION  Type of Visit: Evaluation    See electronic medical record for Abuse and Falls Screening details.    Subjective       Presenting condition or subjective complaint: R CADE  Date of onset: 02/12/24    Relevant medical history:   overweight, high cholesterol, high blood pressure, prediabetes  Dates & types of surgery:  L CADE 11/29/2023    Prior diagnostic imaging/testing results: X-ray     Prior therapy history for the same diagnosis, illness or injury: No Had L CADE in November 2023    Prior Level of Function  Transfers: Independent  Ambulation: Independent  ADL: Independent      Living Environment  Social support: With a significant other or spouse   Type of home: House   Stairs to enter the home: No       Ramp: Yes   Stairs inside the home: No       Help at home:    Equipment owned: Straight Cane; Four-point cane; Walker; Walker with wheels; Grab bars; Raised toilet seat     Employment: Yes   Hobbies/Interests:      Patient goals for therapy: walk without cane, stairs    Pain assessment: See objective evaluation for additional pain details     Objective   HIP EVALUATION  PAIN: Pain Level at Rest: 3/10  Pain Level with Use: 7/10  Pain Location: hip  Pain Quality: Aching and Sharp  Pain Frequency: constant  Pain is Exacerbated By: being on feet too long, sitting too long, moving hip too far, quick motions  Pain is Relieved By: rest and medication  Pain Progression: Improved  INTEGUMENTARY (edema, incisions): dried blood at bottom of dressing; no new blood seen.  Dense edema for the R glute, thigh and lower leg/foot.  (Improved with effelurage)  POSTURE:  standing  and sitting with dec weight bear on the R side.    GAIT:   Weightbearing Status: WBAT  Assistive Device(s): Cane (single end)  Gait Deviations: Antalgic  Stance time decreased  Stride length decreased   ROM:  active assistive flexion 70 deg, passive abd 10-15 deg, mild loss of ext in supine lying  PALPATION:  mild  tenderness throughout the lateral hip and thigh  Mild warmth  Assessment & Plan   CLINICAL IMPRESSIONS  Medical Diagnosis: status post total hip replacement, R hip    Treatment Diagnosis: R hip pain, s/p R CADE, aftercare R CADE   Impression/Assessment: Patient is a 68 year old male with R hip  complaints, status post total hip arthroplasty.  The following significant findings have been identified: Pain, Decreased ROM/flexibility, Decreased strength, Edema, Impaired gait, Impaired muscle performance, and Decreased activity tolerance. These impairments interfere with their ability to perform self care tasks, recreational activities, household chores, driving , household mobility, and community mobility as compared to previous level of function.     Clinical Decision Making (Complexity):  Clinical Presentation: Stable/Uncomplicated  Clinical Presentation Rationale: based on medical and personal factors listed in PT evaluation  Clinical Decision Making (Complexity): Low complexity    PLAN OF CARE  Treatment Interventions:  Interventions: Gait Training, Manual Therapy, Neuromuscular Re-education, Therapeutic Activity, Therapeutic Exercise, Self-Care/Home Management  Modalities, if needed, for pain and swelling control - ice, e-stim.     Long Term Goals     PT Goal 1  Goal Identifier: ambulation  Goal Description: patient will be able to walk 15 minutes without hip pain, nor limp without assistive device  Rationale: to maximize safety and independence with performance of ADLs and functional tasks;to maximize safety and independence within the home;to maximize safety and independence with self cares  Goal Progress: using SEC and has not been walking much  Target Date: 04/15/24  PT Goal 2  Goal Identifier: stairs  Goal Description: patient will be able to go up/down stairs, reciprocally without pain  Rationale: to maximize safety and independence within the home;to maximize safety and independence within the community;to  maximize safety and independence with performance of ADLs and functional tasks  Goal Progress: Not currently doing stairs; non reciprocal if does need to do stairs  Target Date: 05/13/24      Frequency of Treatment: 2 time a week for 3 weeks then 1 time a week for 6 weeks  Duration of Treatment: 9 weeks    Recommended Referrals to Other Professionals: Physical Therapy  Education Assessment:   Learner/Method: Patient;Listening;Pictures/Video  Education Comments: PTRx on phone    Risks and benefits of evaluation/treatment have been explained.   Patient/Family/caregiver agrees with Plan of Care.     Evaluation Time:     PT Eval, Low Complexity Minutes (00536): 15   Present: Not applicable     Signing Clinician: Lindsay Rios, PT      [facility]                                                                                   OUTPATIENT PHYSICAL THERAPY      PLAN OF TREATMENT FOR OUTPATIENT REHABILITATION   Patient's Last Name, First Name, KESHAVYadiOLGAYadi  JavonlorenzoAníbal YOB: 1955   Provider's Name   [facility]   Medical Record No.  [id number]     Onset Date: 02/12/24  Start of Care Date: 02/19/24     Medical Diagnosis:  status post total hip replacement, R hip      PT Treatment Diagnosis:  R hip pain, s/p R CADE, aftercare R CADE Plan of Treatment  Frequency/Duration: 2 time a week for 3 weeks then 1 time a week for 6 weeks/ 9 weeks    Certification date from 02/19/24 to 04/22/24         See note for plan of treatment details and functional goals     Lindsay Rios, PT                         I CERTIFY THE NEED FOR THESE SERVICES FURNISHED UNDER        THIS PLAN OF TREATMENT AND WHILE UNDER MY CARE     (Physician attestation of this document indicates review and certification of the therapy plan).              Referring Provider:  Jac Man    Initial Assessment  See Epic Evaluation- Start of Care Date:  02/19/24

## 2024-02-20 RX ORDER — OXYCODONE HYDROCHLORIDE 5 MG/1
5 TABLET ORAL EVERY 4 HOURS PRN
Qty: 20 TABLET | Refills: 0 | Status: SHIPPED | OUTPATIENT
Start: 2024-02-20 | End: 2024-02-23

## 2024-02-26 ENCOUNTER — THERAPY VISIT (OUTPATIENT)
Dept: PHYSICAL THERAPY | Facility: CLINIC | Age: 69
End: 2024-02-26
Attending: ORTHOPAEDIC SURGERY
Payer: COMMERCIAL

## 2024-02-26 DIAGNOSIS — Z47.1 AFTERCARE FOLLOWING HIP JOINT REPLACEMENT SURGERY, UNSPECIFIED LATERALITY: ICD-10-CM

## 2024-02-26 DIAGNOSIS — M25.551 HIP PAIN, RIGHT: Primary | ICD-10-CM

## 2024-02-26 DIAGNOSIS — Z96.649 AFTERCARE FOLLOWING HIP JOINT REPLACEMENT SURGERY, UNSPECIFIED LATERALITY: ICD-10-CM

## 2024-02-26 DIAGNOSIS — Z96.641 STATUS POST HIP REPLACEMENT, RIGHT: ICD-10-CM

## 2024-02-26 PROCEDURE — 97140 MANUAL THERAPY 1/> REGIONS: CPT | Mod: GP | Performed by: PHYSICAL THERAPIST

## 2024-02-26 PROCEDURE — 97110 THERAPEUTIC EXERCISES: CPT | Mod: GP | Performed by: PHYSICAL THERAPIST

## 2024-02-27 DIAGNOSIS — Z96.641 STATUS POST TOTAL REPLACEMENT OF RIGHT HIP: Primary | ICD-10-CM

## 2024-02-27 ASSESSMENT — HOOS JR
GOING UP OR DOWN STAIRS: MODERATE
SITTING: MILD
WALKING ON UNEVEN SURFACE: MILD
LYING IN BED (TURNING OVER, MAINTAINING HIP POSITION): MODERATE
RISING FROM SITTING: MILD
BENDING TO THE FLOOR TO PICK UP OBJECT: MILD
HOOS JR TOTAL INTERVAL SCORE: 64.66

## 2024-03-04 ENCOUNTER — ANCILLARY PROCEDURE (OUTPATIENT)
Dept: GENERAL RADIOLOGY | Facility: CLINIC | Age: 69
End: 2024-03-04
Attending: PHYSICIAN ASSISTANT
Payer: COMMERCIAL

## 2024-03-04 ENCOUNTER — OFFICE VISIT (OUTPATIENT)
Dept: ORTHOPEDICS | Facility: CLINIC | Age: 69
End: 2024-03-04
Payer: COMMERCIAL

## 2024-03-04 ENCOUNTER — THERAPY VISIT (OUTPATIENT)
Dept: PHYSICAL THERAPY | Facility: CLINIC | Age: 69
End: 2024-03-04
Payer: COMMERCIAL

## 2024-03-04 DIAGNOSIS — Z96.642 STATUS POST TOTAL REPLACEMENT OF LEFT HIP: Primary | ICD-10-CM

## 2024-03-04 DIAGNOSIS — Z96.641 STATUS POST TOTAL REPLACEMENT OF RIGHT HIP: ICD-10-CM

## 2024-03-04 DIAGNOSIS — M25.551 HIP PAIN, RIGHT: Primary | ICD-10-CM

## 2024-03-04 DIAGNOSIS — Z96.641 STATUS POST HIP REPLACEMENT, RIGHT: ICD-10-CM

## 2024-03-04 DIAGNOSIS — Z96.649 AFTERCARE FOLLOWING HIP JOINT REPLACEMENT SURGERY, UNSPECIFIED LATERALITY: ICD-10-CM

## 2024-03-04 DIAGNOSIS — Z47.1 AFTERCARE FOLLOWING HIP JOINT REPLACEMENT SURGERY, UNSPECIFIED LATERALITY: ICD-10-CM

## 2024-03-04 PROCEDURE — 99024 POSTOP FOLLOW-UP VISIT: CPT | Performed by: PHYSICIAN ASSISTANT

## 2024-03-04 PROCEDURE — 97110 THERAPEUTIC EXERCISES: CPT | Mod: GP | Performed by: PHYSICAL THERAPIST

## 2024-03-04 PROCEDURE — 73502 X-RAY EXAM HIP UNI 2-3 VIEWS: CPT | Mod: RT | Performed by: RADIOLOGY

## 2024-03-04 PROCEDURE — 97140 MANUAL THERAPY 1/> REGIONS: CPT | Mod: GP | Performed by: PHYSICAL THERAPIST

## 2024-03-04 ASSESSMENT — PAIN SCALES - GENERAL: PAINLEVEL: MILD PAIN (3)

## 2024-03-04 NOTE — PROGRESS NOTES
Chief Complaint: right hip check  DATE OF SERVICE: 2/12/24  SURGEON: Jac Man MD.  POSTOPERATIVE DIAGNOSIS:  Osteoarthritis  OPERATION PERFORMED:  right total hip arthroplasty    HPI: Sachin is a 68-year-old man here 3 weeks status post above procedure for the right hip.  He also has a recent left total hip 4 months ago, both were done by Dr. Man.  Patient reports overall he is doing well.  He still has some stiffness and soreness.  He takes Tylenol occasionally.  He is taking baby aspirin twice a day as directed.  He is back to driving.  Has been showering.  He is attending physical therapy.  He carries a cane just in case for longer walks.  No other concerns.    Physical Exam: Sachin is a pleasant 68-year-old man who is alert and oriented no apparent distress.  He has a mildly antalgic gait with single-point cane today.  His right hip incision is fully healed with no erythema or drainage.  He has mild swelling and no ecchymosis.  Minimal tenderness.  No calf tenderness.  He is neurovascular intact distally.    Imaging: AP pelvis and lateral of the right hip was ordered and obtained today.  This shows bilateral total hip arthroplasty with equal leg lengths.  No sign of fracture or lucency.    Impression: Patient doing well status post bilateral total hip arthroplasty, right more recent than left    Plan: Patient will continue to progress his activities as tolerated and wean off the cane as he sees fit.  Continue to follow your hip precautions as directed to you by physical therapy.  He can discontinue physical therapy when he plateaus.  He will follow-up in 1 month with clinical check.  If he has any dental work, he will need antibiotics before hand.  He can call for those anytime.  After that, we will see him yearly for x-rays.  He understands and agrees with plan.  All questions answered.

## 2024-03-04 NOTE — LETTER
3/4/2024         RE: Aníbal Goldstein  [address] [address]        Dear Colleague,    Thank you for referring your patient, Aníbal Goldstein, to the [facility]. Please see a copy of my visit note below.    Chief Complaint: right hip check  DATE OF SERVICE: 2/12/24  SURGEON: Jac Man MD.  POSTOPERATIVE DIAGNOSIS:  Osteoarthritis  OPERATION PERFORMED:  right total hip arthroplasty    HPI: Sachin is a 68-year-old man here 3 weeks status post above procedure for the right hip.  He also has a recent left total hip 4 months ago, both were done by Dr. Man.  Patient reports overall he is doing well.  He still has some stiffness and soreness.  He takes Tylenol occasionally.  He is taking baby aspirin twice a day as directed.  He is back to driving.  Has been showering.  He is attending physical therapy.  He carries a cane just in case for longer walks.  No other concerns.    Physical Exam: Sachin is a pleasant 68-year-old man who is alert and oriented no apparent distress.  He has a mildly antalgic gait with single-point cane today.  His right hip incision is fully healed with no erythema or drainage.  He has mild swelling and no ecchymosis.  Minimal tenderness.  No calf tenderness.  He is neurovascular intact distally.    Imaging: AP pelvis and lateral of the right hip was ordered and obtained today.  This shows bilateral total hip arthroplasty with equal leg lengths.  No sign of fracture or lucency.    Impression: Patient doing well status post bilateral total hip arthroplasty, right more recent than left    Plan: Patient will continue to progress his activities as tolerated and wean off the cane as he sees fit.  Continue to follow your hip precautions as directed to you by physical therapy.  He can discontinue physical therapy when he plateaus.  He will follow-up in 1 month with clinical check.  If he has any dental work, he will need antibiotics before  hand.  He can call for those anytime.  After that, we will see him yearly for x-rays.  He understands and agrees with plan.  All questions answered.      Again, thank you for allowing me to participate in the care of your patient.        Sincerely,        Alida Cedeño PA-C

## 2024-03-12 ENCOUNTER — THERAPY VISIT (OUTPATIENT)
Dept: PHYSICAL THERAPY | Facility: CLINIC | Age: 69
End: 2024-03-12
Payer: COMMERCIAL

## 2024-03-12 DIAGNOSIS — Z96.641 STATUS POST HIP REPLACEMENT, RIGHT: ICD-10-CM

## 2024-03-12 DIAGNOSIS — Z96.649 AFTERCARE FOLLOWING HIP JOINT REPLACEMENT SURGERY, UNSPECIFIED LATERALITY: ICD-10-CM

## 2024-03-12 DIAGNOSIS — M25.551 HIP PAIN, RIGHT: Primary | ICD-10-CM

## 2024-03-12 DIAGNOSIS — Z47.1 AFTERCARE FOLLOWING HIP JOINT REPLACEMENT SURGERY, UNSPECIFIED LATERALITY: ICD-10-CM

## 2024-03-12 PROCEDURE — 97110 THERAPEUTIC EXERCISES: CPT | Mod: GP | Performed by: PHYSICAL THERAPIST

## 2024-03-22 ASSESSMENT — HOOS JR
RISING FROM SITTING: MILD
GOING UP OR DOWN STAIRS: MODERATE
HOOS JR TOTAL INTERVAL SCORE: 70.43
SITTING: MILD
LYING IN BED (TURNING OVER, MAINTAINING HIP POSITION): MILD
BENDING TO THE FLOOR TO PICK UP OBJECT: MILD

## 2024-03-25 ENCOUNTER — THERAPY VISIT (OUTPATIENT)
Dept: PHYSICAL THERAPY | Facility: CLINIC | Age: 69
End: 2024-03-25
Payer: COMMERCIAL

## 2024-03-25 DIAGNOSIS — Z47.1 AFTERCARE FOLLOWING HIP JOINT REPLACEMENT SURGERY, UNSPECIFIED LATERALITY: ICD-10-CM

## 2024-03-25 DIAGNOSIS — Z96.641 STATUS POST HIP REPLACEMENT, RIGHT: ICD-10-CM

## 2024-03-25 DIAGNOSIS — M25.551 HIP PAIN, RIGHT: Primary | ICD-10-CM

## 2024-03-25 DIAGNOSIS — Z96.649 AFTERCARE FOLLOWING HIP JOINT REPLACEMENT SURGERY, UNSPECIFIED LATERALITY: ICD-10-CM

## 2024-03-25 PROCEDURE — 97110 THERAPEUTIC EXERCISES: CPT | Mod: GP | Performed by: PHYSICAL THERAPIST

## 2024-03-25 PROCEDURE — 97140 MANUAL THERAPY 1/> REGIONS: CPT | Mod: GP | Performed by: PHYSICAL THERAPIST

## 2024-03-25 ASSESSMENT — ACTIVITIES OF DAILY LIVING (ADL)
PUTTING_ON_SOCKS_AND_SHOES: EXTREME DIFFICULTY
WALKING_15_MINUTES_OR_GREATER: MODERATE DIFFICULTY
WALKING_INITIALLY: SLIGHT DIFFICULTY
LIGHT_TO_MODERATE_WORK: SLIGHT DIFFICULTY
HEAVY_WORK: MODERATE DIFFICULTY
RECREATIONAL_ACTIVITIES: MODERATE DIFFICULTY
ROLLING_OVER_IN_BED: SLIGHT DIFFICULTY
GETTING_INTO_AND_OUT_OF_A_BATHTUB: SLIGHT DIFFICULTY
HOS_ADL_ITEM_SCORE_TOTAL: 36
HOW_WOULD_YOU_RATE_YOUR_CURRENT_LEVEL_OF_FUNCTION_DURING_YOUR_USUAL_ACTIVITIES_OF_DAILY_LIVING_FROM_0_TO_100_WITH_100_BEING_YOUR_LEVEL_OF_FUNCTION_PRIOR_TO_YOUR_HIP_PROBLEM_AND_0_BEING_THE_INABILITY_TO_PERFORM_ANY_OF_YOUR_USUAL_DAILY_ACTIVITIES?: 60
GETTING_INTO_AND_OUT_OF_AN_AVERAGE_CAR: SLIGHT DIFFICULTY
GOING_UP_1_FLIGHT_OF_STAIRS: EXTREME DIFFICULTY
WALKING_APPROXIMATELY_10_MINUTES: MODERATE DIFFICULTY
STANDING_FOR_15_MINUTES: SLIGHT DIFFICULTY
WALKING_DOWN_STEEP_HILLS: MODERATE DIFFICULTY
STEPPING_UP_AND_DOWN_CURBS: SLIGHT DIFFICULTY
HOS_ADL_COUNT: 16
WALKING_UP_STEEP_HILLS: MODERATE DIFFICULTY
HOS_ADL_HIGHEST_POTENTIAL_SCORE: 64
DEEP_SQUATTING: UNABLE TO DO
SITTING_FOR_15_MINUTES: SLIGHT DIFFICULTY
HOS_ADL_SCORE(%): 56.25
GOING_DOWN_1_FLIGHT_OF_STAIRS: MODERATE DIFFICULTY

## 2024-03-25 NOTE — PROGRESS NOTES
03/25/24 0500   Appointment Info   Signing clinician's name / credentials Bryn Rios, PT, Chinyere Victoria SPT   Total/Authorized Visits eval and treat - 12   Visits Used 5   Medical Diagnosis status post total hip replacement, R hip   PT Tx Diagnosis R hip pain, s/p R CADE, aftercare R CADE   Quick Adds Certification   Progress Note/Certification   Start of Care Date 02/19/24   Onset of illness/injury or Date of Surgery 02/12/24   Therapy Frequency every other week for 8 weeks   Predicted Duration 8   Certification date from 02/19/24   Certification date to 04/22/24   Progress Note Due Date 03/25/24   Progress Note Completed Date 02/19/24   GOALS   PT Goals 2   PT Goal 1   Goal Identifier ambulation   Goal Description patient will be able to walk 15 minutes without hip pain, nor limp without assistive device   Rationale to maximize safety and independence with performance of ADLs and functional tasks;to maximize safety and independence within the home;to maximize safety and independence with self cares   Goal Progress does not use SEC.  Has not tried to walk for 15 minutes   Target Date 04/15/24   PT Goal 2   Goal Identifier stairs   Goal Description patient will be able to go up/down stairs, reciprocally without pain   Rationale to maximize safety and independence within the home;to maximize safety and independence within the community;to maximize safety and independence with performance of ADLs and functional tasks   Goal Progress using railings to assist with reciprocal stairs - up and down   Target Date 05/13/24   Subjective Report   Subjective Report Patient notes that he is a bit sore today as he has been working on his truck to change a tire, brakes, etc. He has to move 80# piece of equipment.  He is not longer using his cane.  Ascending stairs non-reciprocal (due to pain)  reciprocal down.  Overall, he feels that he is 60% improved but needs to get stronger   Objective Measures   Objective Measures  Objective Measure 1;Objective Measure 2;Objective Measure 3;Objective Measure 4   Objective Measure 1   Objective Measure hip ROM   Details Supine passive 90 deg with ease, Abd 40 deg.   Objective Measure 2   Objective Measure MMT   Details Flex 4/5, Abd 3+/5, IR 4/5 w pain (without pain after manual work, ER 3+/5, extension  3+/5   Objective Measure 3   Objective Measure gait   Details no AD, antalgic gait with wide base of support, decreased hip extension   Objective Measure 4   Objective Measure HOS   Details 56.25, improved from 26.47   Treatment Interventions (PT)   Interventions Therapeutic Procedure/Exercise;Manual Therapy   Therapeutic Procedure/Exercise   Therapeutic Procedures: strength, endurance, ROM, flexibility minutes (70945) 30   Ther Proc 1 Upright bike   Ther Proc 1 - Details seat 4, x 8 min   Ther Proc 2 leg press   Ther Proc 2 - Details back flat 66#, 15 reps, 2 sets   PTRx Ther Proc 1 Prone On Elbows   PTRx Ther Proc 1 - Details during manual work   PTRx Ther Proc 2 Hip Extension Straight Leg Raise   PTRx Ther Proc 2 - Details 10 reps   PTRx Ther Proc 3 Prone Knee Flexion   PTRx Ther Proc 3 - Details 10 reps prone glute max   PTRx Ther Proc 4 Hip Abduction Straight Leg Raise   PTRx Ther Proc 4 - Details did side stepping laterally down/up 6 M line   PTRx Ther Proc 5 Clamshell Feet together   PTRx Ther Proc 5 - Details did side stepping with knees bent up/down 6 M line  - could feel hip symptoms laterally in both directions.   PTRx Ther Proc 6 Bridging #1   PTRx Ther Proc 6 - Details reviewed   PTRx Ther Proc 8 Knee Bends   PTRx Ther Proc 8 - Details 10  reps - good tech   PTRx Ther Proc 9 Toe Raises   PTRx Ther Proc 9 - Details 15 reps on leg press   PTRx Ther Proc 10 Seated Hamstring Curl with Tubing   PTRx Ther Proc 10 - Details HEP   PTRx Ther Proc 11 Lateral Stepdown with Neutral Trunk Position   PTRx Ther Proc 11 - Details held due to lateral hip pain today   PTRx Ther Proc 12 One Leg  Stands Supported   PTRx Ther Proc 12 - Details verbal review   Skilled Intervention increased soreness with increased activity with having to work on the wheel of his truck   Patient Response/Progress soreness with side stepping if knees are bent but he feels that is due to lack of strength   Therapeutic Activity   PTRx Ther Act 1 Stair Step Ups   Manual Therapy   Manual Therapy: Mobilization, MFR, MLD, friction massage minutes (81497) 10   Manual Therapy 1 prone - effelurage/soft tissue work   Manual Therapy 1 - Details from thigh to hip/glute   Skilled Intervention dense edema around scar   Patient Response/Progress dec pain, density of edema improved; improved standing posture   Education   Learner/Method Patient;Listening;Pictures/Video   Education Comments PTRx on phone   Plan   Home program Per PTRx   Updates to plan of care adv strengthening and ROM   Plan for next session progress reps/weights as tolerated   Comments   Comments Patient has increased his activity level with feeling better.  Although now, has increased pain.  Overall, doing well since surgery but needs to gain strength.  Would like to continue PT every other week for 8 weeks   Total Session Time   Timed Code Treatment Minutes 40   Total Treatment Time (sum of timed and untimed services) 40       PLAN  Continue therapy per current plan of care.    Beginning/End Dates of Progress Note Reporting Period:  02/19/24 to 03/25/2024    Referring Provider:  Jac Man

## 2024-03-26 ENCOUNTER — OFFICE VISIT (OUTPATIENT)
Dept: ORTHOPEDICS | Facility: CLINIC | Age: 69
End: 2024-03-26
Payer: COMMERCIAL

## 2024-03-26 DIAGNOSIS — Z96.641 STATUS POST TOTAL REPLACEMENT OF RIGHT HIP: Primary | ICD-10-CM

## 2024-03-26 PROCEDURE — 99024 POSTOP FOLLOW-UP VISIT: CPT | Performed by: ORTHOPAEDIC SURGERY

## 2024-03-26 NOTE — LETTER
3/26/2024         RE: Aníbal Goldstein  [address] [facility] [address]        Dear Colleague,    Thank you for referring your patient, Aníbal Goldstein, to the [facility]. Please see a copy of my visit note below.    Chief Complaint: Follow Up (6 week follow up s/p RTHA DOS:2/12/24, Stiff today after being very active yesterday. PT going well.)     HPI: Aníbal Goldstein returns today in follow-up for his right hip. He reports that he is doing very well. He is using no pain medication, no assist device, and has been very active doing chores and physical therapy. Happy with how he is doing so far.     Current Status:  ACOSTA Jr: 70.43  [facility]:  Global Mental Health Score -    Global Physical Health Score -    PROMIS TOTAL - SUBSCORES -    Medications and allergies are documented in the EMR and have been reviewed.      Current Outpatient Medications:      acetaminophen (TYLENOL) 325 MG tablet, Take 2 tablets (650 mg) by mouth every 4 hours as needed for other (mild pain), Disp: 100 tablet, Rfl: 0     acetaminophen (TYLENOL) 500 MG tablet, Take 1-2 tablets (500-1,000 mg) by mouth every 6 hours as needed for mild pain, Disp: , Rfl:      aspirin 81 MG EC tablet, Take 1 tablet (81 mg) by mouth 2 times daily, Disp: 60 tablet, Rfl: 0     atorvastatin (LIPITOR) 40 MG tablet, Take 1 tablet (40 mg) by mouth every evening, Disp: 90 tablet, Rfl: 3     lisinopril (ZESTRIL) 10 MG tablet, Take 0.5 tablets (5 mg) by mouth daily, Disp: 90 tablet, Rfl: 3     oxyCODONE (ROXICODONE) 5 MG tablet, Take 1-2 tablets (5-10 mg) by mouth every 4 hours as needed for moderate to severe pain, Disp: 26 tablet, Rfl: 0     polyethylene glycol (MIRALAX) 17 g packet, Take 17 g by mouth daily, Disp: 7 packet, Rfl: 0     senna-docusate (SENOKOT-S/PERICOLACE) 8.6-50 MG tablet, Take 1-2 tablets by mouth 2 times daily Take while on oral narcotics to prevent or treat constipation., Disp: 30 tablet, Rfl:  0    Current Facility-Administered Medications:      triamcinolone (KENALOG-40) injection 40 mg, 40 mg, , , Joe Kraus, DO, 40 mg at 08/25/23 1151     triamcinolone (KENALOG-40) injection 40 mg, 40 mg, , , Joe Kraus Fausto, DO, 40 mg at 08/25/23 1151    Allergies: No known allergies    Physical Exam:  On physical examination the patient appears the stated age, is in no acute distress, affect is appropriate, and breathing is non-labored.    There were no vitals taken for this visit.  There is no height or weight on file to calculate BMI.    Rises from chair: easily   Gait:  near normal   Incision is healed an benign  ROM:  fluid and painless  Flexion: 90+  Can stand on toes and heels    Reviewed radiographs from 3/4 with patient.   Assessment: doing well     Plan: continue with rehab program and PT and RTC in 6 weeks, sooner if issues.   No ref. provider found      Again, thank you for allowing me to participate in the care of your patient.        Sincerely,        Jac Man MD

## 2024-03-26 NOTE — PROGRESS NOTES
Chief Complaint: Follow Up (6 week follow up s/p RTHA DOS:2/12/24, Stiff today after being very active yesterday. PT going well.)     HPI: Aníbal Goldstein returns today in follow-up for his right hip. He reports that he is doing very well. He is using no pain medication, no assist device, and has been very active doing chores and physical therapy. Happy with how he is doing so far.     Current Status:  HOOS Jr: 70.43  [facility]:  Global Mental Health Score -    Global Physical Health Score -    PROMIS TOTAL - SUBSCORES -    Medications and allergies are documented in the EMR and have been reviewed.      Current Outpatient Medications:     acetaminophen (TYLENOL) 325 MG tablet, Take 2 tablets (650 mg) by mouth every 4 hours as needed for other (mild pain), Disp: 100 tablet, Rfl: 0    acetaminophen (TYLENOL) 500 MG tablet, Take 1-2 tablets (500-1,000 mg) by mouth every 6 hours as needed for mild pain, Disp: , Rfl:     aspirin 81 MG EC tablet, Take 1 tablet (81 mg) by mouth 2 times daily, Disp: 60 tablet, Rfl: 0    atorvastatin (LIPITOR) 40 MG tablet, Take 1 tablet (40 mg) by mouth every evening, Disp: 90 tablet, Rfl: 3    lisinopril (ZESTRIL) 10 MG tablet, Take 0.5 tablets (5 mg) by mouth daily, Disp: 90 tablet, Rfl: 3    oxyCODONE (ROXICODONE) 5 MG tablet, Take 1-2 tablets (5-10 mg) by mouth every 4 hours as needed for moderate to severe pain, Disp: 26 tablet, Rfl: 0    polyethylene glycol (MIRALAX) 17 g packet, Take 17 g by mouth daily, Disp: 7 packet, Rfl: 0    senna-docusate (SENOKOT-S/PERICOLACE) 8.6-50 MG tablet, Take 1-2 tablets by mouth 2 times daily Take while on oral narcotics to prevent or treat constipation., Disp: 30 tablet, Rfl: 0    Current Facility-Administered Medications:     triamcinolone (KENALOG-40) injection 40 mg, 40 mg, , , Joe Kraus DO, 40 mg at 08/25/23 1151    triamcinolone (KENALOG-40) injection 40 mg, 40 mg, , , Joe Kraus DO, 40 mg at 08/25/23 1151    Allergies: No known  allergies    Physical Exam:  On physical examination the patient appears the stated age, is in no acute distress, affect is appropriate, and breathing is non-labored.    There were no vitals taken for this visit.  There is no height or weight on file to calculate BMI.    Rises from chair: easily   Gait:  near normal   Incision is healed an benign  ROM:  fluid and painless  Flexion: 90+  Can stand on toes and heels    Reviewed radiographs from 3/4 with patient.   Assessment: doing well     Plan: continue with rehab program and PT and RTC in 6 weeks, sooner if issues.   No ref. provider found

## 2024-03-26 NOTE — NURSING NOTE
Aníbal Goldstein's chief complaint for this visit includes:  Chief Complaint   Patient presents with    Follow Up     6 week follow up s/p RTHA DOS:2/12/24, Stiff today after being very active yesterday. PT going well.       Referring Provider:  No referring provider defined for this encounter.    There were no vitals taken for this visit.  Data Unavailable   Global Mental Health Score:    Global Physical Health Score:    PROMIS TOTAL - SUBSCORES:    [facility]: 5  HOOS Jr. 70.43    Pain increases with: too much activity  Previous surgeries: 2/12/24-RTHA, 11/29/23-LTHA  Previous injections within last 6 months: NO  Treatments done: PT going well  Imaging completed: 3/4/24 right hip  Pain: 1/10  Concerns: none    Hanna Staples, ATC

## 2024-04-15 ENCOUNTER — THERAPY VISIT (OUTPATIENT)
Dept: PHYSICAL THERAPY | Facility: CLINIC | Age: 69
End: 2024-04-15
Payer: COMMERCIAL

## 2024-04-15 DIAGNOSIS — Z47.1 AFTERCARE FOLLOWING HIP JOINT REPLACEMENT SURGERY, UNSPECIFIED LATERALITY: ICD-10-CM

## 2024-04-15 DIAGNOSIS — M25.551 HIP PAIN, RIGHT: Primary | ICD-10-CM

## 2024-04-15 DIAGNOSIS — Z96.649 AFTERCARE FOLLOWING HIP JOINT REPLACEMENT SURGERY, UNSPECIFIED LATERALITY: ICD-10-CM

## 2024-04-15 DIAGNOSIS — Z96.641 STATUS POST HIP REPLACEMENT, RIGHT: ICD-10-CM

## 2024-04-15 PROCEDURE — 97110 THERAPEUTIC EXERCISES: CPT | Mod: GP | Performed by: PHYSICAL THERAPIST

## 2024-05-01 ENCOUNTER — THERAPY VISIT (OUTPATIENT)
Dept: PHYSICAL THERAPY | Facility: CLINIC | Age: 69
End: 2024-05-01
Payer: COMMERCIAL

## 2024-05-01 DIAGNOSIS — Z47.1 AFTERCARE FOLLOWING HIP JOINT REPLACEMENT SURGERY, UNSPECIFIED LATERALITY: ICD-10-CM

## 2024-05-01 DIAGNOSIS — Z96.649 AFTERCARE FOLLOWING HIP JOINT REPLACEMENT SURGERY, UNSPECIFIED LATERALITY: ICD-10-CM

## 2024-05-01 DIAGNOSIS — Z96.641 STATUS POST HIP REPLACEMENT, RIGHT: ICD-10-CM

## 2024-05-01 DIAGNOSIS — M25.551 HIP PAIN, RIGHT: Primary | ICD-10-CM

## 2024-05-01 PROCEDURE — 97110 THERAPEUTIC EXERCISES: CPT | Mod: GP | Performed by: PHYSICAL THERAPIST

## 2024-05-02 NOTE — PROGRESS NOTES
05/01/24 0500   Appointment Info   Signing clinician's name / credentials Bryn Rios PT   Total/Authorized Visits eval and treat - 12   Visits Used 7   Medical Diagnosis status post total hip replacement, R hip   PT Tx Diagnosis R hip pain, s/p R CADE, aftercare R CADE   Quick Adds Certification   Progress Note/Certification   Start of Care Date 02/19/24   Onset of illness/injury or Date of Surgery 02/12/24   Therapy Frequency every other week for 10 weeks   Predicted Duration 10 weeks   Certification date from 04/22/24   Certification date to 07/01/24   Progress Note Due Date 05/01/24   Progress Note Completed Date 03/25/24   GOALS   PT Goals 2   PT Goal 1   Goal Identifier ambulation   Goal Description patient will be able to walk 15 minutes without hip pain, nor limp without assistive device   Rationale to maximize safety and independence with performance of ADLs and functional tasks;to maximize safety and independence within the home;to maximize safety and independence with self cares   Goal Progress significant improvement in gait.  Minimal limp due to weakness.  Able to be on his feet/walk more than 15 minutes without AD   Target Date 05/27/24   PT Goal 2   Goal Identifier stairs   Goal Description patient will be able to go up/down stairs, reciprocally without pain   Rationale to maximize safety and independence within the home;to maximize safety and independence within the community;to maximize safety and independence with performance of ADLs and functional tasks   Goal Progress patient is able to go up 20 steps reciprocally without use of his hands - goal met for ascending stairs.  Can feel symptoms medial thigh with controlled lowering when going down stairs.   Target Date 06/26/24   Subjective Report   Subjective Report Patient relates that his R hip is much better since surgery.  He has significantly increased his activity level.  He will develop soreness at about the glute med muscle and insertion  "area.  He can ice when the area is sore and relieve the symptoms.  Will feel symptoms with controlled lowering from a 6\" step, medial R proximal thigh.  Symptoms do not last long, however are somewhat sharp.  Patient notes that he caught his L foot when coming off a trailer today and ended up jumping down to avoid falling.  Landed on both feet without any pain.  He was surprised that he did so well with the landing.  Does want to get stronger to avoid developing R glute symptoms as the day progresses.   Objective Measures   Objective Measures Objective Measure 1;Objective Measure 2;Objective Measure 3;Objective Measure 4   Objective Measure 1   Objective Measure hip ROM   Details supine flexion to 90 deg with ease, abd 40 deg with strain medial thigh, Prone ER 50 degrees   Objective Measure 2   Objective Measure MMT   Details supine hip flexion SLR 5/5, hip abd SLR 5/5, prone hip ext 4/5, ER 4/5, IR 5-/5   Objective Measure 3   Objective Measure gait   Details without AD; mild loss of hip extension on the R, normalizing lateral bend when WB on the R LE   Objective Measure 4   Objective Measure HOS   Details 56.25 at last PN 3/25/2024; not reassessed today   Treatment Interventions (PT)   Interventions Therapeutic Procedure/Exercise   Therapeutic Procedure/Exercise   Ther Proc 1 Upright bike   Ther Proc 1 - Details seat 4, x 10 min L 8   Ther Proc 2 leg press   Ther Proc 2 - Details single leg 66# 10 reps, 2 sets   PTRx Ther Proc 1 Prone On Elbows   PTRx Ther Proc 1 - Details verbal review to stretch anterior hip   PTRx Ther Proc 2 hip abduction vs green band at ankles   PTRx Ther Proc 2 - Details 10 reps L LE then 10 reps R LE - very challenging   PTRx Ther Proc 6 Bridging #1   PTRx Ther Proc 6 - Details HEP - verbal review   PTRx Ther Proc 8 Knee Bends   PTRx Ther Proc 8 - Details single leg 10 reps, 2 sets with some hand support and work toward sitting back to plinth   PTRx Ther Proc 9 Side Stepping With Theraband " "  PTRx Ther Proc 9 - Details will do standing hip abd for ea LE; green band around ankles   PTRx Ther Proc 10 Stepdown Backward   PTRx Ther Proc 10 - Details good progress 6\" 10 reps- without hands   PTRx Ther Proc 11 One Leg Stands Supported   PTRx Ther Proc 11 - Details x1 min    PTRx Ther Proc 12 Toe Raises   PTRx Ther Proc 12 - Details 15 reps on leg press   Skilled Intervention advanced strengthening; focus on hip abd vs band and single leg knee bends   Patient Response/Progress did well with single leg knee bends when utilizing proper form   Education   Learner/Method Patient;Listening;Pictures/Video   Education Comments PTRx on phone   Plan   Home program Per PTRx   Updates to plan of care leg press resumed, hip abd with green band around both ankles, single leg knee bends   Plan for next session Quarters   Comments   Comments Patient is approximately 12 weeks s/p R CADE.  He has improved R hip strength over the past month.  His gait has improved as has his ROM.  He continues to be cautious with activity, but his activity level has improved dramatically over the past month - climbing off/on trailers, working on trucks - under vehicles, etc.  Would like to continue PT about every other week for 10 weeks to advance strengthening to clear R glute symptoms.       [facility]                                                                                   OUTPATIENT PHYSICAL THERAPY    PLAN OF TREATMENT FOR OUTPATIENT REHABILITATION   Patient's Last Name, First Name, Aníbal Slade YOB: 1955   Provider's Name   [facility]   Medical Record No.  [id number]     Onset Date: 02/12/24  Start of Care Date: 02/19/24     Medical Diagnosis:  status post total hip replacement, R hip      PT Treatment Diagnosis:  R hip pain, s/p R CADE, aftercare R CADE Plan of Treatment  Frequency/Duration: every other week for 10 weeks/ 10 " weeks    Certification date from 04/22/24 to 07/01/24         See note for plan of treatment details and functional goals     Lindsay Rios, PT                         I CERTIFY THE NEED FOR THESE SERVICES FURNISHED UNDER        THIS PLAN OF TREATMENT AND WHILE UNDER MY CARE     (Physician attestation of this document indicates review and certification of the therapy plan).              Referring Provider:  Jac Man    Initial Assessment  See Epic Evaluation- Start of Care Date: 02/19/24            PLAN  Continue therapy per current plan of care.    Beginning/End Dates of Progress Note Reporting Period:  03/25/24 to 05/01/2024    Referring Provider:  Jac Man

## 2024-05-07 ENCOUNTER — VIRTUAL VISIT (OUTPATIENT)
Dept: ORTHOPEDICS | Facility: CLINIC | Age: 69
End: 2024-05-07
Payer: COMMERCIAL

## 2024-05-07 DIAGNOSIS — Z96.641 STATUS POST TOTAL REPLACEMENT OF RIGHT HIP: Primary | ICD-10-CM

## 2024-05-07 PROCEDURE — 99024 POSTOP FOLLOW-UP VISIT: CPT | Mod: 93 | Performed by: ORTHOPAEDIC SURGERY

## 2024-05-07 ASSESSMENT — HOOS JR
HOOS JR TOTAL INTERVAL SCORE: 92.34
BENDING TO THE FLOOR TO PICK UP OBJECT: MILD

## 2024-05-07 NOTE — PROGRESS NOTES
I spoke with Sachin on the phone this afternoon.  He reports that he is doing very well. Wife on the phone call. No pain medication, no assist device. He is back at work, doing all his desired reports no hip pain, happy with how he is doing.     We discussed activities of total hips. We discussed the typical follow-up. All the patient's questions were answered to the best of my ability.      Assessment: 12 weeks s/p right total hip doing very well.   Plan: appropriate for one year follow-up. Bilateral radiographs at that time. Sooner if issues.

## 2024-05-07 NOTE — NURSING NOTE
Aníbal Goldstein's chief complaint for this visit includes:  Chief Complaint   Patient presents with    Surgical Followup     3mo s/p RTHA 2/12/24       Referring Provider:  No referring provider defined for this encounter.    There were no vitals taken for this visit.  Data Unavailable   Global Mental Health Score:    Global Physical Health Score:    PROMIS TOTAL - SUBSCORES:    [facility]: 5  HOOS Jr. 92.34    Pain increases with: Just muscle soreness  Previous surgeries: RTHA 2/12/24  Previous injections within last 6 months: NO  Treatments done: PT - almost done  Imaging completed: n/a  Pain: none  Concerns: None, happy with progress     Wood Roth, ATC

## 2024-05-07 NOTE — LETTER
5/7/2024         RE: Aníbal Goldstein  [address] [facility] [address]        Dear Colleague,    Thank you for referring your patient, Aníbal Goldstein, to the [facility]. Please see a copy of my visit note below.    I spoke with Sachin on the phone this afternoon.  He reports that he is doing very well. Wife on the phone call. No pain medication, no assist device. He is back at work, doing all his desired reports no hip pain, happy with how he is doing.     We discussed activities of total hips. We discussed the typical follow-up. All the patient's questions were answered to the best of my ability.      Assessment: 12 weeks s/p right total hip doing very well.   Plan: appropriate for one year follow-up. Bilateral radiographs at that time. Sooner if issues.       Again, thank you for allowing me to participate in the care of your patient.        Sincerely,        Jac Man MD

## 2024-05-21 ENCOUNTER — THERAPY VISIT (OUTPATIENT)
Dept: PHYSICAL THERAPY | Facility: CLINIC | Age: 69
End: 2024-05-21
Payer: COMMERCIAL

## 2024-05-21 DIAGNOSIS — Z96.649 AFTERCARE FOLLOWING HIP JOINT REPLACEMENT SURGERY, UNSPECIFIED LATERALITY: ICD-10-CM

## 2024-05-21 DIAGNOSIS — Z47.1 AFTERCARE FOLLOWING HIP JOINT REPLACEMENT SURGERY, UNSPECIFIED LATERALITY: ICD-10-CM

## 2024-05-21 DIAGNOSIS — M25.551 HIP PAIN, RIGHT: Primary | ICD-10-CM

## 2024-05-21 DIAGNOSIS — Z96.641 STATUS POST HIP REPLACEMENT, RIGHT: ICD-10-CM

## 2024-05-21 PROCEDURE — 97110 THERAPEUTIC EXERCISES: CPT | Mod: GP | Performed by: PHYSICAL THERAPY ASSISTANT

## 2024-06-18 ENCOUNTER — THERAPY VISIT (OUTPATIENT)
Dept: PHYSICAL THERAPY | Facility: CLINIC | Age: 69
End: 2024-06-18
Payer: COMMERCIAL

## 2024-06-18 DIAGNOSIS — Z96.641 STATUS POST HIP REPLACEMENT, RIGHT: ICD-10-CM

## 2024-06-18 DIAGNOSIS — Z47.1 AFTERCARE FOLLOWING HIP JOINT REPLACEMENT SURGERY, UNSPECIFIED LATERALITY: ICD-10-CM

## 2024-06-18 DIAGNOSIS — M25.551 HIP PAIN, RIGHT: Primary | ICD-10-CM

## 2024-06-18 DIAGNOSIS — Z96.649 AFTERCARE FOLLOWING HIP JOINT REPLACEMENT SURGERY, UNSPECIFIED LATERALITY: ICD-10-CM

## 2024-06-18 PROCEDURE — 97110 THERAPEUTIC EXERCISES: CPT | Mod: GP | Performed by: PHYSICAL THERAPY ASSISTANT

## 2024-06-18 ASSESSMENT — ACTIVITIES OF DAILY LIVING (ADL)
HEAVY_WORK: MODERATE DIFFICULTY
LIGHT_TO_MODERATE_WORK: SLIGHT DIFFICULTY
GETTING_INTO_AND_OUT_OF_AN_AVERAGE_CAR: SLIGHT DIFFICULTY
ROLLING_OVER_IN_BED: SLIGHT DIFFICULTY
HOW_WOULD_YOU_RATE_YOUR_CURRENT_LEVEL_OF_FUNCTION_DURING_YOUR_USUAL_ACTIVITIES_OF_DAILY_LIVING_FROM_0_TO_100_WITH_100_BEING_YOUR_LEVEL_OF_FUNCTION_PRIOR_TO_YOUR_HIP_PROBLEM_AND_0_BEING_THE_INABILITY_TO_PERFORM_ANY_OF_YOUR_USUAL_DAILY_ACTIVITIES?: 90
WALKING_15_MINUTES_OR_GREATER: NO DIFFICULTY AT ALL
HOS_ADL_HIGHEST_POTENTIAL_SCORE: 68
SITTING_FOR_15_MINUTES: NO DIFFICULTY AT ALL
TWISTING/PIVOTING_ON_INVOLVED_LEG: SLIGHT DIFFICULTY
GOING_UP_1_FLIGHT_OF_STAIRS: SLIGHT DIFFICULTY
HOS_ADL_ITEM_SCORE_TOTAL: 50
RECREATIONAL_ACTIVITIES: MODERATE DIFFICULTY
WALKING_APPROXIMATELY_10_MINUTES: NO DIFFICULTY AT ALL
GETTING_INTO_AND_OUT_OF_A_BATHTUB: MODERATE DIFFICULTY
WALKING_DOWN_STEEP_HILLS: MODERATE DIFFICULTY
PUTTING_ON_SOCKS_AND_SHOES: MODERATE DIFFICULTY
HOS_ADL_SCORE(%): 73.53
STANDING_FOR_15_MINUTES: NO DIFFICULTY AT ALL
DEEP_SQUATTING: MODERATE DIFFICULTY
WALKING_INITIALLY: NO DIFFICULTY AT ALL
HOS_ADL_COUNT: 17
GOING_DOWN_1_FLIGHT_OF_STAIRS: SLIGHT DIFFICULTY
STEPPING_UP_AND_DOWN_CURBS: NO DIFFICULTY AT ALL
WALKING_UP_STEEP_HILLS: MODERATE DIFFICULTY

## 2024-06-19 PROBLEM — Z96.641 STATUS POST HIP REPLACEMENT, RIGHT: Status: RESOLVED | Noted: 2024-02-19 | Resolved: 2024-06-19

## 2024-06-19 PROBLEM — Z47.1 AFTERCARE FOLLOWING HIP JOINT REPLACEMENT SURGERY, UNSPECIFIED LATERALITY: Status: RESOLVED | Noted: 2024-02-19 | Resolved: 2024-06-19

## 2024-06-19 PROBLEM — Z96.649 AFTERCARE FOLLOWING HIP JOINT REPLACEMENT SURGERY, UNSPECIFIED LATERALITY: Status: RESOLVED | Noted: 2024-02-19 | Resolved: 2024-06-19

## 2024-06-19 PROBLEM — M25.551 HIP PAIN, RIGHT: Status: RESOLVED | Noted: 2024-02-19 | Resolved: 2024-06-19

## 2024-06-19 NOTE — PROGRESS NOTES
06/18/24 0500   Appointment Info   Signing clinician's name / credentials Karen Jacobo PTA   Total/Authorized Visits eval and treat - 12   Visits Used 9   Medical Diagnosis status post total hip replacement, R hip   PT Tx Diagnosis R hip pain, s/p R CADE, aftercare R CADE   Quick Adds Certification;PTA Supervision   Progress Note/Certification   Start of Care Date 02/19/24   Onset of illness/injury or Date of Surgery 02/12/24   Therapy Frequency every other week for 10 weeks   Predicted Duration 10 weeks   Certification date from 04/22/24   Certification date to 07/01/24   Progress Note Completed Date 06/18/24   Supervision   PT Assistant Visit Number 2   GOALS   PT Goals 2   PT Goal 1   Goal Identifier ambulation   Goal Description patient will be able to walk 15 minutes without hip pain, nor limp without assistive device   Rationale to maximize safety and independence with performance of ADLs and functional tasks;to maximize safety and independence within the home;to maximize safety and independence with self cares   Goal Progress 15+min without pain or limp   Target Date 05/27/24   Date Met 06/18/24   PT Goal 2   Goal Identifier stairs   Goal Description patient will be able to go up/down stairs, reciprocally without pain   Rationale to maximize safety and independence within the home;to maximize safety and independence within the community;to maximize safety and independence with performance of ADLs and functional tasks   Goal Progress reciprocal pattern without pain, also able to step up/down from truck/trailer   Target Date 06/26/24   Date Met 06/18/24   Subjective Report   Subjective Report Pt has been seen for 9 PT sessions following R CADE on 2/12/24, pt reports 90% improved with daily function. He will have intermittent soreness in B hip region although no pain. Full performance of daily and work activities. Fair consistency with HEP.   Objective Measures   Objective Measures Objective Measure  "1;Objective Measure 2;Objective Measure 3;Objective Measure 4   Objective Measure 1   Objective Measure hip ROM   Details supine flexion to 90 deg, abd 40 deg, prone ER 50 degrees   Objective Measure 2   Objective Measure MMT   Details grossly 5/5 for B hip/knee except for hip ext 4/5   Objective Measure 3   Objective Measure gait   Details without AD; mild loss of hip extension on the R, upright trunk posture   Objective Measure 4   Objective Measure HOS   Details improved from 27 to 74%   Treatment Interventions (PT)   Interventions Therapeutic Procedure/Exercise   Therapeutic Procedure/Exercise   Therapeutic Procedures: strength, endurance, ROM, flexibility minutes (36642) 28   Ther Proc 1 discussed using stationary bike or bike for continued conditioning   PTRx Ther Proc 1 Prone On Elbows   PTRx Ther Proc 1 - Details 2 min   PTRx Ther Proc 2 verbal review of full HEP   PTRx Ther Proc 6 Bridging #1   PTRx Ther Proc 6 - Details 20 reps   PTRx Ther Proc 7 prone SLR ext   PTRx Ther Proc 7 - Details 10 reps each   PTRx Ther Proc 10 B knee bends supported   PTRx Ther Proc 10 - Details 5\" hold x 10 reps with focus on glut work   PTRx Ther Proc 11 SLS   PTRx Ther Proc 11 - Details 1 min B without hand tap, greater effort to maintain on R LE   Skilled Intervention verbal cues   Patient Response/Progress pt demonstrates understanding of HEP   Education   Learner/Method Patient;Listening;Pictures/Video   Education Comments PTRx on phone   Plan   Home program Per PTRx   Updates to plan of care DC   Comments   Comments PN/DC written in collaboration with Lindsay Rios, PT   Total Session Time   Timed Code Treatment Minutes 28   Total Treatment Time (sum of timed and untimed services) 28         DISCHARGE  Reason for Discharge: Patient has met all goals.    Equipment Issued: none    Discharge Plan: Patient to continue home program.    Referring Provider:  Jac Man    "

## 2024-08-28 ASSESSMENT — ACTIVITIES OF DAILY LIVING (ADL)
STANDING FOR 15 MINUTES: SLIGHT DIFFICULTY
WALKING_INITIALLY: SLIGHT DIFFICULTY
HEAVY_WORK: SLIGHT DIFFICULTY
WALKING_15_MINUTES_OR_GREATER: SLIGHT DIFFICULTY
ADL_TOTAL_ITEM_SCORE: 0
SPORTS_COUNT: 9
HEAVY_WORK: SLIGHT DIFFICULTY
LOW_IMPACT_ACTIVITIES_LIKE_FAST_WALKING: SLIGHT DIFFICULTY
PUTTING ON SOCKS AND SHOES: SLIGHT DIFFICULTY
DEEP_SQUATTING: SLIGHT DIFFICULTY
GETTING INTO AND OUT OF AN AVERAGE CAR: SLIGHT DIFFICULTY
ABILITY_TO_PARTICIPATE_IN_YOUR_DESIRED_SPORT_AS_LONG_AS_YOU_WOULD_LIKE: MODERATE DIFFICULTY
SPORTS_HIGHEST_POTENTIAL_SCORE: 36
PLEASE_INDICATE_YOR_PRIMARY_REASON_FOR_REFERRAL_TO_THERAPY:: HIP
WALKING_INITIALLY: SLIGHT DIFFICULTY
ADL_HIGHEST_POTENTIAL_SCORE: 68
LIGHT_TO_MODERATE_WORK: NO DIFFICULTY AT ALL
GETTING_INTO_AND_OUT_OF_A_BATHTUB: MODERATE DIFFICULTY
STARTING_AND_STOPPING_QUICKLY: NO DIFFICULTY AT ALL
ADL_COUNT: 17
GETTING_INTO_AND_OUT_OF_AN_AVERAGE_CAR: SLIGHT DIFFICULTY
WALKING_UP_STEEP_HILLS: SLIGHT DIFFICULTY
RECREATIONAL ACTIVITIES: SLIGHT DIFFICULTY
SPORTS_TOTAL_ITEM_SCORE: 0
WALKING_APPROXIMATELY_10_MINUTES: SLIGHT DIFFICULTY
HOS_ADL_HIGHEST_POTENTIAL_SCORE: 68
ROLLING OVER IN BED: NO DIFFICULTY AT ALL
SPORTS_SCORE(%): 0
WALKING_DOWN_STEEP_HILLS: SLIGHT DIFFICULTY
GOING DOWN 1 FLIGHT OF STAIRS: NO DIFFICULTY AT ALL
RUNNING_ONE_MILE: UNABLE TO DO
GETTING_INTO_AND_OUT_OF_A_BATHTUB: MODERATE DIFFICULTY
WALKING_15_MINUTES_OR_GREATER: SLIGHT DIFFICULTY
STEPPING UP AND DOWN CURBS: NO DIFFICULTY AT ALL
GOING_DOWN_1_FLIGHT_OF_STAIRS: NO DIFFICULTY AT ALL
HOS_ADL_ITEM_SCORE_TOTAL: 56
RECREATIONAL_ACTIVITIES: SLIGHT DIFFICULTY
TWISTING/PIVOTING_ON_INVOLVED_LEG: NO DIFFICULTY AT ALL
DEEP SQUATTING: SLIGHT DIFFICULTY
SWINGING_OBJECTS_LIKE_A_GOLF_CLUB: NO DIFFICULTY AT ALL
SITTING_FOR_15_MINUTES: NO DIFFICULTY AT ALL
GOING_UP_1_FLIGHT_OF_STAIRS: NO DIFFICULTY AT ALL
WALKING_FOR_APPROXIMATELY_10_MINUTES: SLIGHT DIFFICULTY
STEPPING_UP_AND_DOWN_CURBS: NO DIFFICULTY AT ALL
PUTTING_ON_SOCKS_AND_SHOES: SLIGHT DIFFICULTY
HOS_ADL_SCORE(%): 82.35
ABILITY_TO_PERFORM_ACTIVITY_WITH_YOUR_NORMAL_TECHNIQUE: SLIGHT DIFFICULTY
ADL_SCORE(%): 0
LANDING: SLIGHT DIFFICULTY
STANDING_FOR_15_MINUTES: SLIGHT DIFFICULTY
ROLLING_OVER_IN_BED: NO DIFFICULTY AT ALL
WALKING_DOWN_STEEP_HILLS: SLIGHT DIFFICULTY
JUMPING: SLIGHT DIFFICULTY
LIGHT_TO_MODERATE_WORK: NO DIFFICULTY AT ALL
CUTTING/LATERAL_MOVEMENTS: SLIGHT DIFFICULTY
GOING UP 1 FLIGHT OF STAIRS: NO DIFFICULTY AT ALL
TWISTING/PIVOTING ON INVOLVED LEG: NO DIFFICULTY AT ALL
WALKING_UP_STEEP_HILLS: SLIGHT DIFFICULTY
HOW_WOULD_YOU_RATE_YOUR_CURRENT_LEVEL_OF_FUNCTION?: NEARLY NORMAL
SITTING FOR 15 MINUTES: NO DIFFICULTY AT ALL

## 2024-08-29 DIAGNOSIS — Z96.643 STATUS POST TOTAL REPLACEMENT OF BOTH HIPS: Primary | ICD-10-CM

## 2024-08-30 ENCOUNTER — THERAPY VISIT (OUTPATIENT)
Dept: PHYSICAL THERAPY | Facility: CLINIC | Age: 69
End: 2024-08-30
Payer: COMMERCIAL

## 2024-08-30 DIAGNOSIS — Z96.641 STATUS POST TOTAL REPLACEMENT OF RIGHT HIP: Primary | ICD-10-CM

## 2024-08-30 DIAGNOSIS — Z96.643 STATUS POST TOTAL REPLACEMENT OF BOTH HIPS: ICD-10-CM

## 2024-08-30 PROCEDURE — 97110 THERAPEUTIC EXERCISES: CPT | Mod: GP | Performed by: PHYSICAL THERAPIST

## 2024-08-30 PROCEDURE — 97161 PT EVAL LOW COMPLEX 20 MIN: CPT | Mod: GP | Performed by: PHYSICAL THERAPIST

## 2024-08-30 NOTE — PROGRESS NOTES
PHYSICAL THERAPY EVALUATION  Type of Visit: Evaluation       Fall Risk Screen:  Fall screen completed by: PT  Have you fallen 2 or more times in the past year?: No  Have you fallen and had an injury in the past year?: No  Is patient a fall risk?: No    Subjective   Pt presents to PT 6 months post R CADE with complaints of R hip pain and tightness. Pt reports that he has increased tightness in the R hip compared to the L when trying to get up after sitting or driving for a while. Pt states that his glute takes a minute to relax once he stands. He reports that his pain gets worse throughout the day especially after a work day.         Presenting condition or subjective complaint:    Date of onset: 02/12/24    Relevant medical history:     Dates & types of surgery:      Prior diagnostic imaging/testing results: Other     Prior therapy history for the same diagnosis, illness or injury: Yes      Prior Level of Function  Transfers: Independent  Ambulation: Independent  ADL: Independent      Living Environment  Social support: With a significant other or spouse   Type of home: House; 1 level   Stairs to enter the home: No       Ramp: No   Stairs inside the home: No       Help at home: None  Equipment owned: Straight Cane; Four-point cane; Walker; Walker with wheels; Crutches; Power wheelchair or scooter; Grab bars; Raised toilet seat     Employment: Yes    Hobbies/Interests:      Patient goals for therapy:      Pain assessment: See objective evaluation for additional pain details     Objective   HIP EVALUATION  PAIN: Pain Level at Rest: 0/10  Pain Level with Use: 3/10  Pain Location: hip, glute area behind hip   Pain Quality: Dull  Pain Frequency: intermittent  Pain is Worst: nighttime  Pain is Exacerbated By: sitting in the car for a long time, bending/using, crawling up on trailer, use  Pain is Relieved By: cold and NSAIDs  Pain Progression: Worsened in the last few months    GAIT:   Weightbearing Status: WBAT  Assistive  Device(s): None  Gait Deviations: stiff gait, decreased hip extension, trunk lean onto R  BALANCE/PROPRIOCEPTION:   WEIGHTBEARING ALIGNMENT:   NON-WEIGHTBEARING ALIGNMENT:    ROM:   R Hip ROM   Flexion R: mod limited  Abduction R: WNL   IR R: WNL  ER R: WNL    STRENGTH:    R hip strength   Glute max R: 3+/5  Glute max L: 4/5  Glute med R: 4/5  IR R: 4/5  ER R: 4+/5    LE FLEXIBILITY:  decreased flexibility posterior hip capsule and with ER  SPECIAL TESTS:   FUNCTIONAL TESTS:  able to squat without compensation but feels a stretch in his glute  PALPATION:  tender about insertion of glute med  JOINT MOBILITY:   Performed 10 side glides and walked down hallway and back 2x, increased pain in front of hip, didn't change glute pain    Assessment & Plan   CLINICAL IMPRESSIONS  Medical Diagnosis: R hip CADE    Treatment Diagnosis: Chronic R hip pain, s/p CADE   Impression/Assessment: Patient is a 69 year old male with R hip complaints.  The following significant findings have been identified: Pain, Decreased ROM/flexibility, Decreased strength, Impaired gait, Impaired muscle performance, and Decreased activity tolerance. These impairments interfere with their ability to perform work tasks, recreational activities, driving , household mobility, and community mobility as compared to previous level of function.     Clinical Decision Making (Complexity):  Clinical Presentation: Stable/Uncomplicated  Clinical Presentation Rationale: based on medical and personal factors listed in PT evaluation  Clinical Decision Making (Complexity): Low complexity    PLAN OF CARE  Treatment Interventions:  Interventions: Manual Therapy, Neuromuscular Re-education, Therapeutic Activity, Therapeutic Exercise    Long Term Goals     PT Goal 1  Goal Identifier: (P) stand/walk/sit  Goal Description: Pt will be able to complete a full work day without increase in hip symptoms  Rationale: to maximize safety and independence with performance of ADLs and  functional tasks;to maximize safety and independence within the home;to maximize safety and independence with transportation;to maximize safety and independence within the community;to maximize safety and independence with self cares  Goal Progress: R glute pain will progressively increase as his day progresses  Target Date: (P) 11/22/24      Frequency of Treatment: (P) 1x/week (may decrease frequency as he improves)  Duration of Treatment: (P) 6 weeks (may see up to 12 weeks with the decrease in frequency)    Recommended Referrals to Other Professionals:  none at this time  Education Assessment:   Learner/Method: Patient;No Barriers to Learning    Risks and benefits of evaluation/treatment have been explained.   Patient/Family/caregiver agrees with Plan of Care.     Evaluation Time:     PT Eval, Low Complexity Minutes (94711): 25   Present: Not applicable     Signing Clinician: Lindsay Rios, PT, Ashley Sotelo, GUERO        [facility]                                                                                   OUTPATIENT PHYSICAL THERAPY      PLAN OF TREATMENT FOR OUTPATIENT REHABILITATION   Patient's Last Name, First Name, Aníbal Slade YOB: 1955   Provider's Name   [facility]   Medical Record No.  [id number]     Onset Date: 02/12/24  Start of Care Date: (P) 08/30/24     Medical Diagnosis:  R hip CADE      PT Treatment Diagnosis:  Chronic R hip pain, s/p CADE Plan of Treatment  Frequency/Duration: (P) 1x/week (may decrease frequency as he improves)/ (P) 6 weeks (may see up to 12 weeks with the decrease in frequency)    Certification date from (P) 08/30/24 to (P) 11/22/24         See note for plan of treatment details and functional goals     Lindsay Rios, PT                         I CERTIFY THE NEED FOR THESE SERVICES FURNISHED UNDER        THIS PLAN OF TREATMENT AND WHILE UNDER MY CARE .              Physician Signature               Date    X_____________________________________________________                  Referring Provider:  Jac Man    Initial Assessment  See Epic Evaluation- Start of Care Date: (P) 08/30/24

## 2024-09-18 ENCOUNTER — PATIENT OUTREACH (OUTPATIENT)
Dept: CARE COORDINATION | Facility: CLINIC | Age: 69
End: 2024-09-18
Payer: COMMERCIAL

## 2024-09-23 ENCOUNTER — THERAPY VISIT (OUTPATIENT)
Dept: PHYSICAL THERAPY | Facility: CLINIC | Age: 69
End: 2024-09-23
Payer: COMMERCIAL

## 2024-09-23 DIAGNOSIS — Z96.641 STATUS POST TOTAL REPLACEMENT OF RIGHT HIP: ICD-10-CM

## 2024-09-23 DIAGNOSIS — M25.551 CHRONIC RIGHT HIP PAIN: Primary | ICD-10-CM

## 2024-09-23 DIAGNOSIS — G89.29 CHRONIC RIGHT HIP PAIN: Primary | ICD-10-CM

## 2024-09-23 PROCEDURE — 97110 THERAPEUTIC EXERCISES: CPT | Mod: GP | Performed by: PHYSICAL THERAPIST

## 2024-10-02 ENCOUNTER — PATIENT OUTREACH (OUTPATIENT)
Dept: CARE COORDINATION | Facility: CLINIC | Age: 69
End: 2024-10-02

## 2024-10-02 ENCOUNTER — THERAPY VISIT (OUTPATIENT)
Dept: PHYSICAL THERAPY | Facility: CLINIC | Age: 69
End: 2024-10-02
Payer: COMMERCIAL

## 2024-10-02 DIAGNOSIS — Z96.641 STATUS POST TOTAL REPLACEMENT OF RIGHT HIP: ICD-10-CM

## 2024-10-02 DIAGNOSIS — M25.551 CHRONIC RIGHT HIP PAIN: Primary | ICD-10-CM

## 2024-10-02 DIAGNOSIS — G89.29 CHRONIC RIGHT HIP PAIN: Primary | ICD-10-CM

## 2024-10-02 PROCEDURE — 97140 MANUAL THERAPY 1/> REGIONS: CPT | Mod: GP | Performed by: PHYSICAL THERAPIST

## 2024-10-02 PROCEDURE — 97110 THERAPEUTIC EXERCISES: CPT | Mod: GP | Performed by: PHYSICAL THERAPIST

## 2024-10-30 NOTE — NURSING NOTE
"Aníbal Goldstein's chief complaint for this visit includes:  Chief Complaint   Patient presents with    Right Hip - Pain    Left Hip - Pain     L hip pain > R       [place]erring [place]  [address] [place] [address]    Ht 1.683 m (5' 6.25\")   Wt 93.8 kg (206 lb 14.4 oz)   BMI 33.14 kg/m    Mild Pain (2)   Global Mental Health Score: (P) 19  Global Physical Health Score: (P) 12  PROMIS TOTAL - SUBSCORES: (P) 31  [facility]: 3    Pain increases with: use/motion, stairs, bending to tie left shoe, climbing in and out of equipment (skid  & )  Previous surgeries: bilateral shoulder cuff repair, bilateral meniscus repair, bilateral carpal tunnel repair  Previous injections within last 6 months: NO  Treatments done: PT  Imaging completed: 5/4/2023  Pain: 2/10  Concerns: Would like to try to do surgery after December 1st.    Aliza Nunes CMA            " Patient

## 2024-11-18 DIAGNOSIS — E78.2 MIXED HYPERLIPIDEMIA: Chronic | ICD-10-CM

## 2024-11-18 DIAGNOSIS — H53.121 TRANSIENT VISUAL LOSS, RIGHT: ICD-10-CM

## 2024-11-18 RX ORDER — ATORVASTATIN CALCIUM 40 MG/1
40 TABLET, FILM COATED ORAL EVERY MORNING
Qty: 90 TABLET | Refills: 0 | Status: SHIPPED | OUTPATIENT
Start: 2024-11-18

## 2024-11-18 NOTE — TELEPHONE ENCOUNTER
A 1x blu refill has been given. Patient is due for a follow-up appointment.    Please contact patient and assist in scheduling appointment.     Inform patient future refills will be declined without completing appointment for monitoring control, or making mutually agreed upon follow-up arrangements .       Thank you,    Dr. Garcia

## 2024-12-12 ENCOUNTER — OFFICE VISIT (OUTPATIENT)
Dept: FAMILY MEDICINE | Facility: CLINIC | Age: 69
End: 2024-12-12
Payer: COMMERCIAL

## 2024-12-12 VITALS
HEIGHT: 66 IN | BODY MASS INDEX: 36.08 KG/M2 | SYSTOLIC BLOOD PRESSURE: 124 MMHG | TEMPERATURE: 97.8 F | RESPIRATION RATE: 17 BRPM | DIASTOLIC BLOOD PRESSURE: 78 MMHG | OXYGEN SATURATION: 95 % | HEART RATE: 81 BPM | WEIGHT: 224.5 LBS

## 2024-12-12 DIAGNOSIS — E78.2 MIXED HYPERLIPIDEMIA: ICD-10-CM

## 2024-12-12 DIAGNOSIS — Z71.89 ACP (ADVANCE CARE PLANNING): ICD-10-CM

## 2024-12-12 DIAGNOSIS — Z00.00 ENCOUNTER FOR MEDICARE ANNUAL WELLNESS EXAM: Primary | ICD-10-CM

## 2024-12-12 DIAGNOSIS — Z12.5 SCREENING FOR PROSTATE CANCER: ICD-10-CM

## 2024-12-12 DIAGNOSIS — I10 BENIGN ESSENTIAL HYPERTENSION: Chronic | ICD-10-CM

## 2024-12-12 LAB
BASOPHILS # BLD AUTO: 0 10E3/UL (ref 0–0.2)
BASOPHILS NFR BLD AUTO: 0 %
EOSINOPHIL # BLD AUTO: 0.5 10E3/UL (ref 0–0.7)
EOSINOPHIL NFR BLD AUTO: 7 %
ERYTHROCYTE [DISTWIDTH] IN BLOOD BY AUTOMATED COUNT: 12.8 % (ref 10–15)
HCT VFR BLD AUTO: 39.6 % (ref 40–53)
HGB BLD-MCNC: 13.4 G/DL (ref 13.3–17.7)
IMM GRANULOCYTES # BLD: 0 10E3/UL
IMM GRANULOCYTES NFR BLD: 0 %
LYMPHOCYTES # BLD AUTO: 1.7 10E3/UL (ref 0.8–5.3)
LYMPHOCYTES NFR BLD AUTO: 26 %
MCH RBC QN AUTO: 30.5 PG (ref 26.5–33)
MCHC RBC AUTO-ENTMCNC: 33.8 G/DL (ref 31.5–36.5)
MCV RBC AUTO: 90 FL (ref 78–100)
MONOCYTES # BLD AUTO: 0.6 10E3/UL (ref 0–1.3)
MONOCYTES NFR BLD AUTO: 9 %
NEUTROPHILS # BLD AUTO: 3.8 10E3/UL (ref 1.6–8.3)
NEUTROPHILS NFR BLD AUTO: 57 %
PLATELET # BLD AUTO: 231 10E3/UL (ref 150–450)
RBC # BLD AUTO: 4.4 10E6/UL (ref 4.4–5.9)
WBC # BLD AUTO: 6.7 10E3/UL (ref 4–11)

## 2024-12-12 RX ORDER — ATORVASTATIN CALCIUM 40 MG/1
40 TABLET, FILM COATED ORAL EVERY MORNING
Qty: 90 TABLET | Refills: 3 | Status: SHIPPED | OUTPATIENT
Start: 2024-12-12

## 2024-12-12 RX ORDER — LISINOPRIL 10 MG/1
5 TABLET ORAL DAILY
Qty: 90 TABLET | Refills: 3 | Status: SHIPPED | OUTPATIENT
Start: 2024-12-12

## 2024-12-12 SDOH — HEALTH STABILITY: PHYSICAL HEALTH: ON AVERAGE, HOW MANY MINUTES DO YOU ENGAGE IN EXERCISE AT THIS LEVEL?: 40 MIN

## 2024-12-12 SDOH — HEALTH STABILITY: PHYSICAL HEALTH: ON AVERAGE, HOW MANY DAYS PER WEEK DO YOU ENGAGE IN MODERATE TO STRENUOUS EXERCISE (LIKE A BRISK WALK)?: 3 DAYS

## 2024-12-12 ASSESSMENT — PAIN SCALES - GENERAL: PAINLEVEL_OUTOF10: NO PAIN (0)

## 2024-12-12 ASSESSMENT — SOCIAL DETERMINANTS OF HEALTH (SDOH): HOW OFTEN DO YOU GET TOGETHER WITH FRIENDS OR RELATIVES?: TWICE A WEEK

## 2024-12-12 NOTE — PATIENT INSTRUCTIONS
Patient Education     Call your insurance about where to get the RSV vaccine if interested.   Preventive Care Advice   This is general advice given by our system to help you stay healthy. However, your care team may have specific advice just for you. Please talk to your care team about your preventive care needs.  Nutrition  Eat 5 or more servings of fruits and vegetables each day.  Try wheat bread, brown rice and whole grain pasta (instead of white bread, rice, and pasta).  Get enough calcium and vitamin D. Check the label on foods and aim for 100% of the RDA (recommended daily allowance).  Lifestyle  Exercise at least 150 minutes each week  (30 minutes a day, 5 days a week).  Do muscle strengthening activities 2 days a week. These help control your weight and prevent disease.  No smoking.  Wear sunscreen to prevent skin cancer.  Have a dental exam and cleaning every 6 months.  Yearly exams  See your health care team every year to talk about:  Any changes in your health.  Any medicines your care team has prescribed.  Preventive care, family planning, and ways to prevent chronic diseases.  Shots (vaccines)   HPV shots (up to age 26), if you've never had them before.  Hepatitis B shots (up to age 59), if you've never had them before.  COVID-19 shot: Get this shot when it's due.  Flu shot: Get a flu shot every year.  Tetanus shot: Get a tetanus shot every 10 years.  Pneumococcal, hepatitis A, and RSV shots: Ask your care team if you need these based on your risk.  Shingles shot (for age 50 and up)  General health tests  Diabetes screening:  Starting at age 35, Get screened for diabetes at least every 3 years.  If you are younger than age 35, ask your care team if you should be screened for diabetes.  Cholesterol test: At age 39, start having a cholesterol test every 5 years, or more often if advised.  Bone density scan (DEXA): At age 50, ask your care team if you should have this scan for osteoporosis (brittle  bones).  Hepatitis C: Get tested at least once in your life.  STIs (sexually transmitted infections)  Before age 24: Ask your care team if you should be screened for STIs.  After age 24: Get screened for STIs if you're at risk. You are at risk for STIs (including HIV) if:  You are sexually active with more than one person.  You don't use condoms every time.  You or a partner was diagnosed with a sexually transmitted infection.  If you are at risk for HIV, ask about PrEP medicine to prevent HIV.  Get tested for HIV at least once in your life, whether you are at risk for HIV or not.  Cancer screening tests  Cervical cancer screening: If you have a cervix, begin getting regular cervical cancer screening tests starting at age 21.  Breast cancer scan (mammogram): If you've ever had breasts, begin having regular mammograms starting at age 40. This is a scan to check for breast cancer.  Colon cancer screening: It is important to start screening for colon cancer at age 45.  Have a colonoscopy test every 10 years (or more often if you're at risk) Or, ask your provider about stool tests like a FIT test every year or Cologuard test every 3 years.  To learn more about your testing options, visit:   .  For help making a decision, visit:   https://bit.ly/ao60276.  Prostate cancer screening test: If you have a prostate, ask your care team if a prostate cancer screening test (PSA) at age 55 is right for you.  Lung cancer screening: If you are a current or former smoker ages 50 to 80, ask your care team if ongoing lung cancer screenings are right for you.  For informational purposes only. Not to replace the advice of your health care provider. Copyright   2023 [employer]. All rights reserved. Clinically reviewed by the [facility] Transitions Program. Distra [id number] - REV 01/24.

## 2024-12-12 NOTE — RESULT ENCOUNTER NOTE
Evert Spangler,    If you have not viewed these results on [employer] within 3 days, we will use an alternative method to contact you. We will contact you via the following protocol:    - Via letter if your results are normal.  - Via phone ([phone]) if your results are abnormal.     Here are my comments about your recent results:    CBC Results - Your cell counts were normal.    Please call the clinic ([phone]), or message us on [employer]Info with any questions you may have.     Have a great day,    Dr. Garcia

## 2024-12-12 NOTE — PROGRESS NOTES
Preventive Care Visit  [facility] CASEY Navarrete MD, Family Medicine  Dec 12, 2024    Assessment & Plan   1. Encounter for Medicare annual wellness exam (Primary)  Discussed personal health and safety. Routine screenings ordered as below. Appropriate anticipatory guidance, vaccinations, and health screening recommendations delivered according to the USPSTF and other appropriate society guidelines. Aníabl reports understanding and in agreement with this mutually agreed upon plan.    Today's Orders:  - REVIEW OF HEALTH MAINTENANCE PROTOCOL ORDERS  - CT ADVANCE CARE PLANNING FIRST 30 MINS  - Lipid panel reflex to direct LDL Non-fasting; Future  - Comprehensive metabolic panel; Future  - CBC with Platelets & Differential; Future  - Prostate Specific Antigen Screen; Future    2. Benign essential hypertension  Symptoms stable/controlled. Medication monitoring labs ordered/reviewed. Tolerating pharmacotherapy well. Continue current regiment. Medication refilled per orders as below.   - lisinopril (ZESTRIL) 10 MG tablet; Take 0.5 tablets (5 mg) by mouth daily.  Dispense: 90 tablet; Refill: 3    3. Mixed hyperlipidemia  Check labs to monitor efficacy of interventions (medication, lifestyle, etc). No side effects reported. Continue on current therapy. See medication list for more details. Ok for refill of current lipid lowering medications for next 1 year. Will need follow-up visit with labs in 1 year.  - atorvastatin (LIPITOR) 40 MG tablet; Take 1 tablet (40 mg) by mouth every morning.  Dispense: 90 tablet; Refill: 3    4. ACP (advance care planning)  Discussed importance of ACP as part of standard end of life care planning / Preventative health. Discussed it can improve his care and make decisions/grief easier for family should it ever need to be implemented. Discussion lasted < 30 min.  - CT ADVANCE CARE PLANNING FIRST 30 MINS    5. Screening for prostate cancer  Patient elects to undergo  PSA screening after informed decision making process. This discussion with the patient included reviewing the benefits of testing such as: Ability to easily add on to existing blood work, screening for a common cancer in men which has treatment options and otherwise may have been silent, and possibility for early detection to prevent morbidity from future metastasis and/or death. Additionally, risks were discussed including possibility of false positive testing (leading to anxiety and further unnecessary testing/biopsies), possibility of over treating a cancer that may not affect a man in his lifetime, and the side effects of the current treatment options for prosate cancer (urinary incontinence, ED, etc).  - Prostate Specific Antigen Screen; Future       Follow-up Visit   Expected date:  Dec 19, 2025 (Approximate)      Follow Up Appointment Details:     Follow-up with whom?: PCP    Follow-Up for what?: Medicare Wellness    Welcome or Annual?: Annual Wellness    How?: In Person               Jaime Navarrete MD  [facility]    Disclaimer: This note consists of symbols derived from keyboarding, dictation and/or voice recognition software. As a result, there may be errors in the script that have gone undetected. Please consider this when interpreting information found in this chart.    Subjective   Aníbal is a 69 year old, presenting for the following:  Physical        12/12/2024     2:13 PM   Additional Questions   Roomed by Daniela VASQUEZ   Accompanied by Self     HPI  Hypertension:  Currently controlled.     Aníbal has had a positive response to his current hypertension treatment regimen. he reports adherence to his prescribed medications, which are listed below, with no medication side effects noted. Aníbal  notes a significant improvement in his symptoms since initiating treatment, including a reduction in blood pressure readings. he states that he has been monitoring his blood  pressure at home. Overall, Aníbal expresses satisfaction with his current management plan and reports a notable enhancement in his quality of life since starting treatment for hypertension.    he specifically denies current symptoms of chest pain, shortness of breath, claudication symptoms, etc.      Current medications:  Medication (Note: This includes the hypertensive combination subclass to make sure to show all ACEI/ARB's.)       ACE Inhibitors       lisinopril (ZESTRIL) 10 MG tablet Take 0.5 tablets (5 mg) by mouth daily                Hyperlipidemia:  Currently controlled.     Aníbal has had a positive response to his current hyperlipidemia treatment regimen. he reports adherence to his prescribed medications, which are listed on the patient's medication list. Aníbal  notes a significant improvement in his cholesterol levels since initiating treatment. he states that he has not noticed significant side effects such as muscle achiness.     Overall, Aníbal expresses satisfaction with his current management plan and reports a notable enhancement in his quality of life since starting treatment for hyperlipidemia.    he specifically denies current symptoms of chest pain, shortness of breath, claudication symptoms, etc.              Health Care Directive  Patient does not have a Health Care Directive: Discussed advance care planning with patient; information given to patient to review.      12/12/2024   General Health   How would you rate your overall physical health? Good   Feel stress (tense, anxious, or unable to sleep) Not at all            12/12/2024   Nutrition   Diet: Regular (no restrictions)            12/12/2024   Exercise   Days per week of moderate/strenous exercise 3 days   Average minutes spent exercising at this level 40 min            12/12/2024   Social Factors   Frequency of gathering with friends or relatives Twice a week   Worry food won't last until get money to buy more No   Food not last  or not have enough money for food? No   Do you have housing? (Housing is defined as stable permanent housing and does not include staying ouside in a car, in a tent, in an abandoned building, in an overnight shelter, or couch-surfing.) Yes   Are you worried about losing your housing? No   Lack of transportation? No   Unable to get utilities (heat,electricity)? No            12/12/2024   Fall Risk   Fallen 2 or more times in the past year? No    Trouble with walking or balance? No        Patient-reported          12/12/2024   Activities of Daily Living- Home Safety   Needs help with the following daily activites None of the above   Safety concerns in the home None of the above            12/12/2024   Dental   Dentist two times every year? Yes            12/12/2024   Hearing Screening   Hearing concerns? None of the above            12/12/2024   Driving Risk Screening   Patient/family members have concerns about driving No            12/12/2024   General Alertness/Fatigue Screening   Have you been more tired than usual lately? No            12/12/2024   Urinary Incontinence Screening   Bothered by leaking urine in past 6 months No            12/12/2024   TB Screening   Were you born outside of the [place]? No            Today's PHQ-2 Score:       12/12/2024    12:01 PM   PHQ-2 ( 1999 [employer])   Q1: Little interest or pleasure in doing things 0    Q2: Feeling down, depressed or hopeless 0    PHQ-2 Score 0    Q1: Little interest or pleasure in doing things Not at all   Q2: Feeling down, depressed or hopeless Not at all   PHQ-2 Score 0       Patient-reported           12/12/2024   Substance Use   Alcohol more than 3/day or more than 7/wk No   Do you have a current opioid prescription? No   How severe/bad is pain from 1 to 10? 3/10   Do you use any other substances recreationally? No        Social History     Tobacco Use    Smoking status: Never    Smokeless tobacco: Never   Vaping Use    Vaping status: Never Used   Substance  Use Topics    Alcohol use: Not Currently     Alcohol/week: 1.7 standard drinks of alcohol     Types: 2 Standard drinks or equivalent per week     Comment: 0-1 per week     Drug use: Never         12/12/2024   AAA Screening   Family history of Abdominal Aortic Aneurysm (AAA)? No      Last PSA:   PSA   Date Value Ref Range Status   04/07/2021 0.93 0 - 4 ug/L Final     Comment:     Assay Method:  Chemiluminescence using Siemens Vista analyzer     Prostate Specific Antigen Screen   Date Value Ref Range Status   11/10/2023 0.84 0.00 - 4.50 ng/mL Final     ASCVD Risk   The 10-year ASCVD risk score (Kleber NDIAYE, et al., 2019) is: 13.9%    Values used to calculate the score:      Age: 69 years      Sex: Male      Is Non- : No      Diabetic: No      Tobacco smoker: No      Systolic Blood Pressure: 124 mmHg      Is BP treated: Yes      HDL Cholesterol: 60 mg/dL      Total Cholesterol: 157 mg/dL    Reviewed and updated as needed this visit by Provider   Tobacco  Allergies  Meds  Problems  Med Hx  Surg Hx  Fam Hx        Social Hx Reviewed    Past Medical History:   Diagnosis Date    Cervicalgia 01/2008    Hypertension 10-6-22    Pain in joint, shoulder region 01/2008     Past Surgical History:   Procedure Laterality Date    ABDOMEN SURGERY  1991    Hernia repair    ARTHROPLASTY HIP Left 11/29/2023    Procedure: Left total hip arthroplasty;  Surgeon: Jac Man MD;  Location: [facility]    ARTHROPLASTY HIP Right 2/12/2024    Procedure: ARTHROPLASTY, HIP, TOTAL RIGHT;  Surgeon: Jac Man MD;  Location: [facility] OR    COLONOSCOPY      COLONOSCOPY WITH CO2 INSUFFLATION N/A 05/17/2021    Procedure: COLONOSCOPY, WITH CO2 INSUFFLATION;  Surgeon: Elder Bates DO;  Location: [facility] OR    HC KNEE SCOPE,MED/LAT MENISECTOMY  02/09/2009    Right knee    HERNIA REPAIR, INGUINAL RT/LT      lt ?    RELEASE CARPAL TUNNEL Right 03/01/2018    Procedure: RELEASE CARPAL TUNNEL;  Right carpal tunnel  "release;  Surgeon: Britney Delgado MD;  Location: [facility] OR    ROTATOR CUFF REPAIR RT/LT  02/18/2010    Right side. Done at [facility].     Current providers sharing in care for this patient include:  Patient Care Team:  Jaime Navarrete MD as PCP - General (Family Medicine)  Driss Chu MD as MD (Family Medicine - Sports Medicine)  Jaime Navarrete MD as Assigned PCP  Pancho Reilly MD as MD (Ophthalmology)  Jaime Navarrete MD as Referring Physician (Family Medicine)  Jac Man MD as Assigned Musculoskeletal Provider    The following health maintenance items are reviewed in Epic and correct as of today:  Health Maintenance   Topic Date Due    RSV VACCINE (1 - Risk 60-74 years 1-dose series) Never done    Pneumococcal Vaccine: 65+ Years (1 of 1 - PCV) Never done    INFLUENZA VACCINE (1) 09/01/2024    COVID-19 Vaccine (4 - 2024-25 season) 09/01/2024    ANNUAL REVIEW OF HM ORDERS  10/18/2024    LIPID  11/10/2024    BMP  01/17/2025    MEDICARE ANNUAL WELLNESS VISIT  12/12/2025    FALL RISK ASSESSMENT  12/12/2025    GLUCOSE  01/17/2027    COLORECTAL CANCER SCREENING  05/17/2028    ADVANCE CARE PLANNING  12/12/2029    DTAP/TDAP/TD IMMUNIZATION (5 - Td or Tdap) 01/04/2031    PHQ-2 (once per calendar year)  Completed    ZOSTER IMMUNIZATION  Completed    HPV IMMUNIZATION  Aged Out    MENINGITIS IMMUNIZATION  Aged Out    RSV MONOCLONAL ANTIBODY  Aged Out    HEPATITIS C SCREENING  Discontinued         Review of Systems  Constitutional, HEENT, cardiovascular, pulmonary, GI, , musculoskeletal, neuro, skin, endocrine and psych systems are negative, except as otherwise noted.     Objective    Exam  /78   Pulse 81   Temp 97.8  F (36.6  C) (Temporal)   Resp 17   Ht 1.68 m (5' 6.14\")   Wt 101.8 kg (224 lb 8 oz)   SpO2 95%   BMI 36.08 kg/m     Estimated body mass index is 36.08 kg/m  as calculated from the following:    Height as of this encounter: 1.68 m (5' " "6.14\").    Weight as of this encounter: 101.8 kg (224 lb 8 oz).    Physical Exam  Constitutional:       Appearance: He is obese.   HENT:      Head: Normocephalic and atraumatic.      Right Ear: Tympanic membrane, ear canal and external ear normal. There is no impacted cerumen.      Left Ear: Tympanic membrane, ear canal and external ear normal. There is no impacted cerumen.      Nose: Nose normal. No congestion.      Mouth/Throat:      Pharynx: Oropharynx is clear. No oropharyngeal exudate or posterior oropharyngeal erythema.   Eyes:      General:         Right eye: No discharge.         Left eye: No discharge.      Extraocular Movements: Extraocular movements intact.      Conjunctiva/sclera: Conjunctivae normal.      Pupils: Pupils are equal, round, and reactive to light.   Cardiovascular:      Rate and Rhythm: Normal rate and regular rhythm.      Heart sounds: Normal heart sounds. No murmur heard.     No friction rub.   Pulmonary:      Effort: Pulmonary effort is normal. No respiratory distress.      Breath sounds: Normal breath sounds. No wheezing or rhonchi.   Abdominal:      General: Abdomen is flat. There is no distension.      Palpations: Abdomen is soft. There is no mass.      Tenderness: There is no abdominal tenderness. There is no guarding.   Musculoskeletal:         General: No swelling.      Cervical back: Normal range of motion and neck supple. No tenderness.      Right lower leg: No edema.      Left lower leg: No edema.   Lymphadenopathy:      Cervical: No cervical adenopathy.   Skin:     General: Skin is warm.      Findings: No rash.   Neurological:      General: No focal deficit present.      Mental Status: He is alert.      Cranial Nerves: No cranial nerve deficit.      Motor: No weakness.      Coordination: Coordination normal.      Gait: Gait normal.   Psychiatric:         Mood and Affect: Mood normal.         Behavior: Behavior normal.         Thought Content: Thought content normal.         " Judgment: Judgment normal.     Labs: Pending         12/12/2024   Mini Cog   Clock Draw Score 2 Normal   3 Item Recall 3 objects recalled   Mini Cog Total Score 5        Signed Electronically by: Jaime Navarrete MD

## 2025-01-12 PROBLEM — M25.551 CHRONIC RIGHT HIP PAIN: Status: RESOLVED | Noted: 2024-01-09 | Resolved: 2025-01-12

## 2025-01-12 PROBLEM — G89.29 CHRONIC RIGHT HIP PAIN: Status: RESOLVED | Noted: 2024-01-09 | Resolved: 2025-01-12

## 2025-01-12 PROBLEM — Z96.641 STATUS POST TOTAL REPLACEMENT OF RIGHT HIP: Status: RESOLVED | Noted: 2024-08-30 | Resolved: 2025-01-12

## 2025-02-06 ENCOUNTER — VIRTUAL VISIT (OUTPATIENT)
Dept: URGENT CARE | Facility: CLINIC | Age: 70
End: 2025-02-06
Payer: COMMERCIAL

## 2025-02-06 DIAGNOSIS — R05.1 ACUTE COUGH: ICD-10-CM

## 2025-02-06 DIAGNOSIS — J32.9 OTHER SINUSITIS, UNSPECIFIED CHRONICITY: Primary | ICD-10-CM

## 2025-02-06 RX ORDER — AZITHROMYCIN 250 MG/1
TABLET, FILM COATED ORAL
Qty: 6 TABLET | Refills: 0 | Status: SHIPPED | OUTPATIENT
Start: 2025-02-06 | End: 2025-02-11

## 2025-02-06 NOTE — PROGRESS NOTES
Phone appointment (no access to video)  Start time: 10:33 AM  Stop time: 10:38 AM  Duration: 5 minutes  Patient location: At home  Provider location: [facility] virtual provider (remote).        CHIEF COMPLAINT: Worsening respiratory symptoms.      HPI: Patient is a 69-year-old male whose been ill for about 8 days with cough and sinus congestion.  He actually feels like, at this point, his symptoms of cough and sinus congestion have actually progressively getting worse.  No shortness of breath.  No chronic respiratory disease      ROS: See HPI otherwise negative    Allergies   Allergen Reactions    No Known Allergies       Current Outpatient Medications   Medication Sig Dispense Refill    azithromycin (ZITHROMAX) 250 MG tablet Take 2 tablets (500 mg) by mouth daily for 1 day, THEN 1 tablet (250 mg) daily for 4 days. 6 tablet 0    acetaminophen (TYLENOL) 325 MG tablet Take 2 tablets (650 mg) by mouth every 4 hours as needed for other (mild pain) 100 tablet 0    acetaminophen (TYLENOL) 500 MG tablet Take 1-2 tablets (500-1,000 mg) by mouth every 6 hours as needed for mild pain      aspirin 81 MG EC tablet Take 1 tablet (81 mg) by mouth 2 times daily 60 tablet 0    atorvastatin (LIPITOR) 40 MG tablet Take 1 tablet (40 mg) by mouth every morning. 90 tablet 3    lisinopril (ZESTRIL) 10 MG tablet Take 0.5 tablets (5 mg) by mouth daily. 90 tablet 3         PE: No acute distress on phone appointment.  No dysphonia.  Patient is nondyspneic sounding speaking in full sentences.        TREATMENT: None.        ASSESSMENT: Protracted respiratory and sinus infection symptoms concerning for bacterial sequela.      DIAGNOSIS: Acute sinusitis.  Cough.      PLAN: Z-Barron as instructed.  Recheck 5 to 7 days if still ill, sooner if worse

## 2025-03-19 ENCOUNTER — TELEPHONE (OUTPATIENT)
Dept: ORTHOPEDICS | Facility: CLINIC | Age: 70
End: 2025-03-19
Payer: COMMERCIAL

## 2025-03-19 DIAGNOSIS — Z96.641 STATUS POST TOTAL REPLACEMENT OF RIGHT HIP: Primary | ICD-10-CM

## 2025-03-19 NOTE — TELEPHONE ENCOUNTER
Other: Patient called and stated that he is still having hip pain in his right hip after surgery. DOS Feb of 2024. Patient would like a call back on what to do. Please call patient.     Could we send this information to you in [employer] or would you prefer to receive a phone call?:   Patient would prefer a phone call   Okay to leave a detailed message?: Yes at Cell number on file:    Telephone Information:   Mobile [phone]

## 2025-03-19 NOTE — TELEPHONE ENCOUNTER
Called and spoke with Pt who stated they are having pain in their R hip that is a continuation of pain felt after surgery. Pain never fully resolved, did do another round of PT that had some help but nothing completely resolved the discomfort/pain. 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 Reports pain as located on their glute in the area of the back pocket on a pair of jeans. Pt scheduled for 1yr follow up while on phone to get updated XR and check in.

## 2025-03-23 ENCOUNTER — MYC REFILL (OUTPATIENT)
Dept: FAMILY MEDICINE | Facility: CLINIC | Age: 70
End: 2025-03-23
Payer: COMMERCIAL

## 2025-03-23 DIAGNOSIS — E78.2 MIXED HYPERLIPIDEMIA: ICD-10-CM

## 2025-03-24 RX ORDER — ATORVASTATIN CALCIUM 40 MG/1
40 TABLET, FILM COATED ORAL EVERY MORNING
Qty: 90 TABLET | Refills: 3 | OUTPATIENT
Start: 2025-03-24

## 2025-04-29 ENCOUNTER — OFFICE VISIT (OUTPATIENT)
Dept: ORTHOPEDICS | Facility: CLINIC | Age: 70
End: 2025-04-29
Payer: COMMERCIAL

## 2025-04-29 ENCOUNTER — ANCILLARY PROCEDURE (OUTPATIENT)
Dept: GENERAL RADIOLOGY | Facility: CLINIC | Age: 70
End: 2025-04-29
Attending: ORTHOPAEDIC SURGERY
Payer: COMMERCIAL

## 2025-04-29 DIAGNOSIS — Z96.641 STATUS POST TOTAL REPLACEMENT OF RIGHT HIP: ICD-10-CM

## 2025-04-29 DIAGNOSIS — Z96.641 STATUS POST TOTAL REPLACEMENT OF RIGHT HIP: Primary | ICD-10-CM

## 2025-04-29 PROCEDURE — 73502 X-RAY EXAM HIP UNI 2-3 VIEWS: CPT | Mod: RT | Performed by: RADIOLOGY

## 2025-04-29 ASSESSMENT — HOOS JR
BENDING TO THE FLOOR TO PICK UP OBJECT: SEVERE
GOING UP OR DOWN STAIRS: SEVERE
WALKING ON UNEVEN SURFACE: MODERATE
RISING FROM SITTING: MODERATE
LYING IN BED (TURNING OVER, MAINTAINING HIP POSITION): MILD
SITTING: MODERATE
HOOS JR TOTAL INTERVAL SCORE: 49.86

## 2025-04-29 NOTE — PROGRESS NOTES
"Chief Complaint: Total Joint Post-op of the Right Hip (1yr s/p RTHA 2/12/25)       HPI: Aníbal Goldstein returns today in follow-up for his right hip. He is 1+ year out from surgery. Here today to discuss right hip pain associated with increased activity. Patient reports a right deep \"ache\" in his posterior hip/groin. Worsens when he walks >1 miles, works long days. No trauma/falls. No incisional issues. No fevers/chills. No numbness/tingling, no back pain, no radiating pain.       Current Status:  HOOS Jr: 49.86  UCLA: 6  Global Mental Health Score -    Global Physical Health Score -    PROMIS TOTAL - SUBSCORES -    Medications and allergies are documented in the EMR and have been reviewed.      Current Outpatient Medications:     acetaminophen (TYLENOL) 325 MG tablet, Take 2 tablets (650 mg) by mouth every 4 hours as needed for other (mild pain), Disp: 100 tablet, Rfl: 0    acetaminophen (TYLENOL) 500 MG tablet, Take 1-2 tablets (500-1,000 mg) by mouth every 6 hours as needed for mild pain, Disp: , Rfl:     aspirin 81 MG EC tablet, Take 1 tablet (81 mg) by mouth 2 times daily, Disp: 60 tablet, Rfl: 0    atorvastatin (LIPITOR) 40 MG tablet, Take 1 tablet (40 mg) by mouth every morning., Disp: 90 tablet, Rfl: 3    lisinopril (ZESTRIL) 10 MG tablet, Take 0.5 tablets (5 mg) by mouth daily., Disp: 90 tablet, Rfl: 3    Allergies: No known allergies      Physical Exam:  On physical examination the patient appears the stated age, is in no acute distress, affect is appropriate, and breathing is non-labored.    There were no vitals taken for this visit.  There is no height or weight on file to calculate BMI.    Rises from chair: without assist  Gait: non antalgic  ROM:  symmetric to contralateral, benign hip exam    Flexion: 90  IRF: 10  ERF: 40  TTP greater troch, no pelvis drop with single leg stand    Distally, the circulatory, motor, and sensation exam is intact with 5/5 EHL, gastroc-soleus, and tibialis anterior. Able " to walk on toes/heels.  Sensation to light touch is intact.  Dorsalis pedis and posterior tibialis pulses are palpable.  There are no sores on the feet, no bruising, and no lymphedema.    XR: XR of right hip today reveal bilateral total hip arthroplasty. Leg length equal. No subsidence or implant alignment changes or failure. No fracture or lucency.     Assessment: 69 M 14 months sp right CADE with pain consistent with abductor complex pain. Recommend physical therapy to work on abductor strengthening, continues symptom relief with OTC pain meds, ice, rest. Follow up in 6 months if symptoms not improved.     Plan: Physical therapy, Follow-up in 6 months, can cancel if pain improved  Referred Self

## 2025-04-29 NOTE — PROGRESS NOTES
I have personally examined this patient and have reviewed the clinical presentation and progress note with the resident. I agree with the treatment plan as outlined. The plan was formulated with the resident on the day of the resident's dictation.  Palpation at the trochanter reproduces symptoms and hip exam otherwise benign. Referred to PT for GT pain program.

## 2025-04-29 NOTE — LETTER
"4/29/2025      Aníbal Goldstein  [address] [facility] [address]      Dear Colleague,    Thank you for referring your patient, Aníbal Goldstein, to the [facility]. Please see a copy of my visit note below.    Chief Complaint: Total Joint Post-op of the Right Hip (1yr s/p RTHA 2/12/25)       HPI: Aníbal Goldstein returns today in follow-up for his right hip. He is 1+ year out from surgery. Here today to discuss right hip pain associated with increased activity. Patient reports a right deep \"ache\" in his posterior hip/groin. Worsens when he walks >1 miles, works long days. No trauma/falls. No incisional issues. No fevers/chills. No numbness/tingling, no back pain, no radiating pain.       Current Status:  HOOS Jr: 49.86  UCLA: 6  Global Mental Health Score -    Global Physical Health Score -    PROMIS TOTAL - SUBSCORES -    Medications and allergies are documented in the EMR and have been reviewed.      Current Outpatient Medications:      acetaminophen (TYLENOL) 325 MG tablet, Take 2 tablets (650 mg) by mouth every 4 hours as needed for other (mild pain), Disp: 100 tablet, Rfl: 0     acetaminophen (TYLENOL) 500 MG tablet, Take 1-2 tablets (500-1,000 mg) by mouth every 6 hours as needed for mild pain, Disp: , Rfl:      aspirin 81 MG EC tablet, Take 1 tablet (81 mg) by mouth 2 times daily, Disp: 60 tablet, Rfl: 0     atorvastatin (LIPITOR) 40 MG tablet, Take 1 tablet (40 mg) by mouth every morning., Disp: 90 tablet, Rfl: 3     lisinopril (ZESTRIL) 10 MG tablet, Take 0.5 tablets (5 mg) by mouth daily., Disp: 90 tablet, Rfl: 3    Allergies: No known allergies      Physical Exam:  On physical examination the patient appears the stated age, is in no acute distress, affect is appropriate, and breathing is non-labored.    There were no vitals taken for this visit.  There is no height or weight on file to calculate BMI.    Rises from chair: without assist  Gait: non antalgic  ROM:  " symmetric to contralateral, benign hip exam    Flexion: 90  IRF: 10  ERF: 40  TTP greater troch, no pelvis drop with single leg stand    Distally, the circulatory, motor, and sensation exam is intact with 5/5 EHL, gastroc-soleus, and tibialis anterior. Able to walk on toes/heels.  Sensation to light touch is intact.  Dorsalis pedis and posterior tibialis pulses are palpable.  There are no sores on the feet, no bruising, and no lymphedema.    XR: XR of right hip today reveal bilateral total hip arthroplasty. Leg length equal. No subsidence or implant alignment changes or failure. No fracture or lucency.     Assessment: 69 M 14 months sp right CADE with pain consistent with abductor complex pain. Recommend physical therapy to work on abductor strengthening, continues symptom relief with OTC pain meds, ice, rest. Follow up in 6 months if symptoms not improved.     Plan: Physical therapy, Follow-up in 6 months, can cancel if pain improved  Referred Self      I have personally examined this patient and have reviewed the clinical presentation and progress note with the resident. I agree with the treatment plan as outlined. The plan was formulated with the resident on the day of the resident's dictation.  Palpation at the trochanter reproduces symptoms and hip exam otherwise benign. Referred to PT for GT pain program.       Again, thank you for allowing me to participate in the care of your patient.        Sincerely,      Jac Man MD  Electronically signed

## 2025-04-29 NOTE — NURSING NOTE
Aníbal Goldstein's chief complaint for this visit includes:  Chief Complaint   Patient presents with    Right Hip - Total Joint Post-op     1yr s/p RTHA 2/12/25       Referring Provider:  Referred Self, MD  No address on file    There were no vitals taken for this visit.  Data Unavailable   Global Mental Health Score:    Global Physical Health Score:    PROMIS TOTAL - SUBSCORES:    UCLA: 6  HOOS Jr. 49.86     Pain increases with: flexion, general physical activity  Previous surgeries: RTHA 2/12/25  Previous injections within last 6 months: NO  Treatments done: ICe  Imaging completed: XR today  Pain: 7/10  Concerns: Having serious pains in glute with activity levels. Sensitive near incision site, exacerbated by extending hip when standing from seated or when bending over.     Wood Roth, ATC

## 2025-05-28 ENCOUNTER — THERAPY VISIT (OUTPATIENT)
Dept: PHYSICAL THERAPY | Facility: CLINIC | Age: 70
End: 2025-05-28
Attending: ORTHOPAEDIC SURGERY
Payer: COMMERCIAL

## 2025-05-28 DIAGNOSIS — Z96.641 CHRONIC HIP PAIN AFTER TOTAL REPLACEMENT OF RIGHT HIP JOINT: Primary | ICD-10-CM

## 2025-05-28 DIAGNOSIS — Z96.641 STATUS POST TOTAL REPLACEMENT OF RIGHT HIP: ICD-10-CM

## 2025-05-28 DIAGNOSIS — M25.551 CHRONIC HIP PAIN AFTER TOTAL REPLACEMENT OF RIGHT HIP JOINT: Primary | ICD-10-CM

## 2025-05-28 DIAGNOSIS — G89.29 CHRONIC HIP PAIN AFTER TOTAL REPLACEMENT OF RIGHT HIP JOINT: Primary | ICD-10-CM

## 2025-05-28 PROCEDURE — 97161 PT EVAL LOW COMPLEX 20 MIN: CPT | Mod: GP | Performed by: PHYSICAL THERAPIST

## 2025-05-28 PROCEDURE — 97110 THERAPEUTIC EXERCISES: CPT | Mod: GP | Performed by: PHYSICAL THERAPIST

## 2025-05-28 ASSESSMENT — ACTIVITIES OF DAILY LIVING (ADL)
WALKING_15_MINUTES_OR_GREATER: EXTREME DIFFICULTY
JUMPING: UNABLE TO DO
SPORTS_HIGHEST_POTENTIAL_SCORE: 36
STEPPING UP AND DOWN CURBS: SLIGHT DIFFICULTY
STEPPING_UP_AND_DOWN_CURBS: SLIGHT DIFFICULTY
GETTING INTO AND OUT OF AN AVERAGE CAR: EXTREME DIFFICULTY
STANDING_FOR_15_MINUTES: MODERATE DIFFICULTY
PUTTING_ON_SOCKS_AND_SHOES: SLIGHT DIFFICULTY
HEAVY_WORK: EXTREME DIFFICULTY
SITTING FOR 15 MINUTES: SLIGHT DIFFICULTY
SPORTS_COUNT: 9
WALKING_DOWN_STEEP_HILLS: SLIGHT DIFFICULTY
WALKING_15_MINUTES_OR_GREATER: EXTREME DIFFICULTY
HOS_ADL_ITEM_SCORE_TOTAL: 35
LANDING: EXTREME DIFFICULTY
SITTING_FOR_15_MINUTES: SLIGHT DIFFICULTY
STARTING_AND_STOPPING_QUICKLY: MODERATE DIFFICULTY
ABILITY_TO_PARTICIPATE_IN_YOUR_DESIRED_SPORT_AS_LONG_AS_YOU_WOULD_LIKE: UNABLE TO DO
WALKING_DOWN_STEEP_HILLS: SLIGHT DIFFICULTY
TWISTING/PIVOTING ON INVOLVED LEG: SLIGHT DIFFICULTY
ADL_HIGHEST_POTENTIAL_SCORE: 68
DEEP_SQUATTING: EXTREME DIFFICULTY
HOS_ADL_HIGHEST_POTENTIAL_SCORE: 64
WALKING_UP_STEEP_HILLS: SLIGHT DIFFICULTY
LIGHT_TO_MODERATE_WORK: SLIGHT DIFFICULTY
ADL_COUNT: 17
HOW_WOULD_YOU_RATE_YOUR_CURRENT_LEVEL_OF_FUNCTION_DURING_YOUR_USUAL_ACTIVITIES_OF_DAILY_LIVING_FROM_0_TO_100_WITH_100_BEING_YOUR_LEVEL_OF_FUNCTION_PRIOR_TO_YOUR_HIP_PROBLEM_AND_0_BEING_THE_INABILITY_TO_PERFORM_ANY_OF_YOUR_USUAL_DAILY_ACTIVITIES?: 18
LOW_IMPACT_ACTIVITIES_LIKE_FAST_WALKING: EXTREME DIFFICULTY
PUTTING ON SOCKS AND SHOES: SLIGHT DIFFICULTY
SPORTS_SCORE(%): 0
HOW_WOULD_YOU_RATE_YOUR_CURRENT_LEVEL_OF_FUNCTION?: ABNORMAL
ROLLING OVER IN BED: SLIGHT DIFFICULTY
GOING DOWN 1 FLIGHT OF STAIRS: MODERATE DIFFICULTY
RECREATIONAL_ACTIVITIES: MODERATE DIFFICULTY
WALKING_FOR_APPROXIMATELY_10_MINUTES: MODERATE DIFFICULTY
SWINGING_OBJECTS_LIKE_A_GOLF_CLUB: SLIGHT DIFFICULTY
SPORTS_TOTAL_ITEM_SCORE: 0
WALKING_UP_STEEP_HILLS: SLIGHT DIFFICULTY
TWISTING/PIVOTING_ON_INVOLVED_LEG: SLIGHT DIFFICULTY
WALKING_INITIALLY: SLIGHT DIFFICULTY
DEEP SQUATTING: EXTREME DIFFICULTY
ABILITY_TO_PERFORM_ACTIVITY_WITH_YOUR_NORMAL_TECHNIQUE: EXTREME DIFFICULTY
ADL_TOTAL_ITEM_SCORE: 0
GOING UP 1 FLIGHT OF STAIRS: MODERATE DIFFICULTY
STANDING FOR 15 MINUTES: MODERATE DIFFICULTY
PLEASE_INDICATE_YOR_PRIMARY_REASON_FOR_REFERRAL_TO_THERAPY:: HIP
ROLLING_OVER_IN_BED: SLIGHT DIFFICULTY
HOW_WOULD_YOU_RATE_YOUR_CURRENT_LEVEL_OF_FUNCTION_DURING_YOUR_USUAL_ACTIVITIES_OF_DAILY_LIVING_FROM_0_TO_100_WITH_100_BEING_YOUR_LEVEL_OF_FUNCTION_PRIOR_TO_YOUR_HIP_PROBLEM_AND_0_BEING_THE_INABILITY_TO_PERFORM_ANY_OF_YOUR_USUAL_DAILY_ACTIVITIES?: 18
GOING_DOWN_1_FLIGHT_OF_STAIRS: MODERATE DIFFICULTY
RUNNING_ONE_MILE: UNABLE TO DO
WALKING_APPROXIMATELY_10_MINUTES: MODERATE DIFFICULTY
ADL_SCORE(%): 0
LIGHT_TO_MODERATE_WORK: SLIGHT DIFFICULTY
GETTING_INTO_AND_OUT_OF_AN_AVERAGE_CAR: EXTREME DIFFICULTY
GOING_UP_1_FLIGHT_OF_STAIRS: MODERATE DIFFICULTY
WALKING_INITIALLY: SLIGHT DIFFICULTY
RECREATIONAL ACTIVITIES: MODERATE DIFFICULTY
CUTTING/LATERAL_MOVEMENTS: EXTREME DIFFICULTY
HEAVY_WORK: EXTREME DIFFICULTY
HOS_ADL_SCORE(%): 54.69
HOW_WOULD_YOU_RATE_YOUR_CURRENT_LEVEL_OF_FUNCTION_DURING_YOUR_SPORTS_RELATED_ACTIVITIES_FROM_0_TO_100_WITH_100_BEING_YOUR_LEVEL_OF_FUNCTION_PRIOR_TO_YOUR_HIP_PROBLEM_AND_0_BEING_THE_INABILITY_TO_PERFORM_ANY_OF_YOUR_USUAL_DAILY_ACTIVITIES?: 16

## 2025-05-28 NOTE — PROGRESS NOTES
PHYSICAL THERAPY EVALUATION  Type of Visit: Evaluation       Fall Risk Screen:  Have you fallen 2 or more times in the past year?: No  Have you fallen and had an injury in the past year?: No  Is patient receiving Physical Therapy Services?: No    Subjective         Presenting condition or subjective complaint: Hip replacement  Date of onset: 04/29/25    Relevant medical history:     Dates & types of surgery:      Prior diagnostic imaging/testing results: X-ray; Other     Prior therapy history for the same diagnosis, illness or injury: Yes      Prior Level of Function  Transfers: Independent  Ambulation: Independent  ADL: Independent      Living Environment  Social support: With a significant other or spouse   Type of home:     Stairs to enter the home:         Ramp:     Stairs inside the home:         Help at home:    Equipment owned:       Employment:      Hobbies/Interests:      Patient goals for therapy: Us right leg normally    Pain assessment: See objective evaluation for additional pain details  Subjective:  Patient relates that he is feeling a constant level of right lateral hip pain, just posterior to the greater trochanter.   His symptoms have been progressively worsening since about mid February 2025 (about the 17th) .   His symptoms will worsen with prolonged sitting, prolonged walking, using the R LE to bring himself up stairs or a ladder.      Objective   HIP EVALUATION  PAIN: Pain Level at Rest: 2/10  Pain Level with Use: 9/10  Pain Location: Lateral R hip   Pain Quality: Aching and sore muscles, discomfort  Pain Frequency: constant  Pain is Exacerbated By: increased activity on his feet, prolonged walking, prolonged sitting, trying to go up stairs leading with the R LE  Pain is Relieved By: cold, rest, and avoiding aggravating activity  Pain Progression: Worsened  INTEGUMENTARY (edema, incisions): doesn't notice swelling but hip can feel warm  POSTURE: slouched sitting - no change in symptoms with  posture correction or slouching further  GAIT:   Weightbearing Status: WBAT  Assistive Device(s): None  Gait Deviations: Antalgic  BALANCE/PROPRIOCEPTION: Single Leg Stance Eyes Open (seconds): 30 sec - did feel symptoms posterior hip during, not worse after  WEIGHTBEARING ALIGNMENT: WNL   ROM:   (Degrees) Left AROM Left PROM  Right AROM Right PROM   Hip Flexion  95  100   Hip Extension    5   Hip Abduction    40   Hip Adduction       Hip Internal Rotation  15  20   Hip External Rotation  45  50   Knee Flexion       Knee Extension       Lumbar Side glide     Lumbar Flexion    Lumbar Extension    Pain: can feel R lateral hip sym with passive ER   End feel: muscular endfeel  STRENGTH: weakness with R hip extension 3/5, abd 4+/5, glute med 4+/5, flexion 5/5  LE FLEXIBILITY: tightness with passive hip extension on the Rt  FUNCTIONAL TESTS: SLS: 30 sec with light finger assistance; could feel symptoms posterior lateral hip  PALPATION:   + Tenderness At Location Left Right   Ischial Tuberosity     Greater Trochanter  +   IT Band     Hip Flexors     Piriformis     PSIS     ASIS     Adductors     Abductors     Iliac Crest     Glut Medius  +   Bursa     Pubis         Assessment & Plan   CLINICAL IMPRESSIONS  Medical Diagnosis: s/p R hip replacement    Treatment Diagnosis: R hip pain, chronic, s/p R hip replacement   Impression/Assessment: Patient is a 69 year old male with R hip complaints, s/p R CADE (2/17/2024).  The following significant findings have been identified: Pain, Decreased ROM/flexibility, Decreased strength, Decreased proprioception, Impaired muscle performance, and Decreased activity tolerance. These impairments interfere with their ability to perform work tasks, household mobility, and community mobility as compared to previous level of function.     Clinical Decision Making (Complexity):  Clinical Presentation: Evolving/Changing  Clinical Presentation Rationale: based on medical and personal factors listed  in PT evaluation  Clinical Decision Making (Complexity): Low complexity    PLAN OF CARE  Treatment Interventions:  Interventions: Manual Therapy, Neuromuscular Re-education, Therapeutic Activity, Therapeutic Exercise, Self-Care/Home Management    Long Term Goals     PT Goal 1  Goal Identifier: ambulation  Goal Description: patient will be able to be on his feet as long as needed without R hip pain  Rationale: to maximize safety and independence with performance of ADLs and functional tasks;to maximize safety and independence within the home;to maximize safety and independence within the community  Goal Progress: constant sym lateral hip, posterior to greater trochanter  Target Date: 08/20/25  PT Goal 2  Goal Identifier: stairs  Goal Description: patient will be able to lead with the R LE going up stairs, whether inside or into his truck  Rationale: to maximize safety and independence with performance of ADLs and functional tasks;to maximize safety and independence within the home;to maximize safety and independence within the community;to maximize safety and independence with transportation  Goal Progress: leading with the L LE due to R hip pain  Target Date: 08/20/25      Frequency of Treatment: 1 time a week for 2 weeks then every other week for 10 weeks  Duration of Treatment: 12 weeks    Recommended Referrals to Other Professionals:   Education Assessment:   Learner/Method: (P) Patient;No Barriers to Learning;Pictures/Video;Demonstration;Reading;Listening  Education Comments: (P) PTRx on phone    Risks and benefits of evaluation/treatment have been explained.   Patient/Family/caregiver agrees with Plan of Care.     Evaluation Time:     PT Eval, Low Complexity Minutes (80951): (P) 23  Evaluation Only     Signing Clinician: Lindsay Rios PT        [facility]                                                                                   OUTPATIENT PHYSICAL THERAPY      PLAN  OF TREATMENT FOR OUTPATIENT REHABILITATION   Patient's Last Name, First Name, Aníbal Slade YOB: 1955   Provider's Name   [facility]   Medical Record No.  [id number]     Onset Date: 04/29/25  Start of Care Date: 05/28/25     Medical Diagnosis:  s/p R hip replacement      PT Treatment Diagnosis:  R hip pain, chronic, s/p R hip replacement Plan of Treatment  Frequency/Duration: 1 time a week for 2 weeks then every other week for 10 weeks/ 12 weeks    Certification date from 05/28/25 to 08/20/25         See note for plan of treatment details and functional goals     Lindsay Rios, PT                         I CERTIFY THE NEED FOR THESE SERVICES FURNISHED UNDER        THIS PLAN OF TREATMENT AND WHILE UNDER MY CARE     (Physician attestation of this document indicates review and certification of the therapy plan).              Referring Provider:  Jac Man    Initial Assessment  See Epic Evaluation- Start of Care Date: 05/28/25

## 2025-05-29 ENCOUNTER — VIRTUAL VISIT (OUTPATIENT)
Dept: FAMILY MEDICINE | Facility: CLINIC | Age: 70
End: 2025-05-29
Payer: COMMERCIAL

## 2025-05-29 ENCOUNTER — TELEPHONE (OUTPATIENT)
Dept: FAMILY MEDICINE | Facility: CLINIC | Age: 70
End: 2025-05-29
Payer: COMMERCIAL

## 2025-05-29 DIAGNOSIS — J01.90 ACUTE SINUSITIS WITH SYMPTOMS > 10 DAYS: Primary | ICD-10-CM

## 2025-05-29 DIAGNOSIS — L60.8 ACQUIRED DYSMORPHIC TOENAIL: Primary | ICD-10-CM

## 2025-05-29 NOTE — PATIENT INSTRUCTIONS
Important Takeaway Points From This Visit: Regarding your Sinus Infection:  You've been diagnosed with a sinus infection, and I'm here to help you feel better.     Below are some important steps and tips for treating your sinus infection:    Symptom Treatment:  - Rest and Hydration: Make sure to get plenty of rest to help your body fight off the infection.  Drink lots of fluids like water, herbal tea, and clear broth to stay hydrated.    - Pain Relief: Over-the-counter pain relievers such as acetaminophen (Tylenol) or ibuprofen (Advil, Motrin) can help alleviate headache or facial pain associated with sinusitis.  You can take 1000 mg of tylenol up to three times daily, as long as another provider has not restricted you from taking this type of medication.  You can take 400-600 mg of Ibuprofen up to three times daily with food, as long as another provider has restricted you from taking this type of medication.    - Warm Compresses: Apply warm compresses to your face to help ease sinus pain and pressure.  Simply soak a clean towel in warm water, wring out the excess water, and place it over your face for a few minutes at a time.    - Nasal Decongestants: Over-the-counter nasal decongestant sprays or drops may help relieve nasal congestion.  Be sure to use them only as directed and for no longer than 3 days to avoid rebound congestion.            Infection Treatment:  - Nasal Saline Irrigation: Use a saline nasal rinse, or nasal spray to help clear out mucus and soothe your nasal passages.  Follow the instructions provided with the saline rinse or spray for proper use. Make sure to use distilled water from the store, not tap water.            - Nasal Corticosteroids: I recommend using a nasal corticosteroid spray, such as Flonase or Nasacort, to reduce inflammation in your nasal passages and improve breathing and drainage.  Use the nasal spray once daily (1-2 sprays into each nostril).   It is best to do a saline rinse  "just prior to using the nasal spray.  Shake and \"prime\" the bottle first making sure a nice spray comes out prior to use.  Aim back into the sinuses, not just straight up your nose. Also aim a little away from the center (septum) of your nose.   Breath in slightly (but not excessively) with each spray. When completed breath out through the mouth  Repeat on the other side.  Wipe of the nozzle with a clean tissue when complete and cover with the manufactures cap.    Store in a room temperature area out of the light.  Don't share sprays with friends and family.    A side effect of most nasal sprays includes nose bleeds.  Be patient as it may take a few days to start working.              - Prescription Medications: After 7-10 days, we typically consider your symptoms to be from a bacterial infection rather than a virus. In your case, we have decided to treat this infection with an antibiotic. Be sure to take the antibiotics exactly as prescribed, even if you start feeling better before you finish the entire course.  All antibiotics can cause diarrhea as a side effect.   In rare instances, use of antibiotics can disrupt your normal gut bacteria that help you digest food and a new bad infection can take over called C. Diff. This leads to explosive watery diarrhea, is not pleasant, and can be difficult to treat. This is one reason we try to avoid antibiotics when able.  Additionally, these bugs can become resistant to our antibiotics over time if used frequently, which is another reason to avoid their use if we can avoid it.    The particular antibiotic I have prescribed for you is called Augmentin.     Augmentin is commonly used to treat sinus, ear, and other respiratory tract infections. It can also be used to treat strep throat, some UTI's, and skin infections.     Here are some important notes about your prescription:   Take this 875 mg tablet twice daily for 10 days.  Swallow each tablet whole with a full glass of " water.  This drug is related to penicillin and should not be taken if you have a history of severe penicillin allergy.  Store Augmentin at room temperature away from moisture and heat.  Common side effects include: nausea, vomiting, stomach upset, headache, and rash.        When to Seek Medical Attention: It's important to follow up with a healthcare provider if your symptoms worsen, or do not improve after a few days of treatment.  I may need to reassess your condition and adjust your treatment plan accordingly.  If you develop severe headache, facial swelling, or vision changes.  If you have a high fever lasting more than a few days.  If you experience difficulty breathing or chest pain.  If you have persistent symptoms despite treatment.    Remember, everyone's body responds differently to treatment, so it's important to be patient and give your body time to heal. If you have any questions or concerns, don't hesitate to reach out to me.

## 2025-05-29 NOTE — TELEPHONE ENCOUNTER
"Wife called regarding patient toenails being curled over into skin, thick and wood like.   They are unable to cut them.    Requesting referral for podiatry    Wife stated great toe on left foot has black at edges, like it is bruised skin. She states \"it does not look necrotic\", red, swollen, weeping or infected. Denies fever, chills, numbness tingling, N&V.     Sending picture of toe for further assessment later today.    Evisit referral to podiatry or wait for further assessment with incoming picture?    Patricia Frederick RN  [facility] - Registered Nurse  Clinic Triage [place]   May 29, 2025          "

## 2025-05-29 NOTE — PROGRESS NOTES
Aníbal is a 69 year old who is being evaluated via a billable video visit.    How would you like to obtain your AVS? MyChart  If the video visit is dropped, the invitation should be resent by: Text to cell phone: [phone]  Will anyone else be joining your video visit? No    Assessment & Plan   1. Acute sinusitis with symptoms > 10 days (Primary)  Patient presenting with greater than 10-day duration of symptoms. Given this, will treat with antibiotic course prescribed below. Discussed completing the entire course of antibiotics. Discussed common side effects of the medication.  Recommend following up if not improving, new symptoms, medication side effects, or if worsening despite treatment. Discussed other conservative cares (nasal saline rinses using distilled water, decongestants, nasal Flonase, etc).  Patient agreeable with plan.  - amoxicillin-clavulanate (AUGMENTIN) 875-125 MG tablet; Take 1 tablet by mouth 2 times daily for 10 days.  Dispense: 20 tablet; Refill: 0    Jaime Navarrete MD  [facility]    Disclaimer: This note consists of symbols derived from keyboarding, dictation and/or voice recognition software. As a result, there may be errors in the script that have gone undetected. Please consider this when interpreting information found in this chart.    The longitudinal plan of care for the diagnosis(es)/condition(s) as documented were addressed during this visit. Due to the added complexity in care, I will continue to support Aníbal in the subsequent management and with ongoing continuity of care.    Subjective   Aníbal is a 69 year old, presenting for the following health issues:  Sinus Problem        5/29/2025     3:53 PM   Additional Questions   Roomed by Daniela   Accompanied by Self     Video Start Time: 5:10 PM    Sinus Problem     History of Present Illness       Reason for visit:  Sinus pain, pressure, coughing  Symptoms include:  Coffee, not sleeping  sinuses and chest fluid filled  Symptom intensity:  Moderate  Symptom progression:  Staying the same  Had these symptoms before:  No  What makes it worse:  How to sleep and complete task tasks  What makes it better:  Sleep don t feel better trying to sleep, but just lizzy wanna keep my eyes shut   He is taking medications regularly.        Ongoing for weeks. Terrible weekend with chills. Sinus pressure around the eyes. Hasn't used nasal flonase, saline rinses, etc.      Review of Systems  Constitutional, HEENT, cardiovascular, pulmonary, GI, , musculoskeletal, neuro, skin, endocrine and psych systems are negative, except as otherwise noted.      Objective           Vitals:  No vitals were obtained today due to virtual visit.    Physical Exam   GENERAL: alert and no distress  EYES: Eyes grossly normal to inspection.  No discharge or erythema, or obvious scleral/conjunctival abnormalities.  RESP: No audible wheeze, cough, or visible cyanosis.    SKIN: Visible skin clear. No significant rash, abnormal pigmentation or lesions.  NEURO: Cranial nerves grossly intact.  Mentation and speech appropriate for age.  PSYCH: Appropriate affect, tone, and pace of words    Labs: None        Video-Visit Details    Type of service:  Video Visit   Video End Time:5:16 PM  Originating Location (pt. Location): Home    Distant Location (provider location):  On-site  Platform used for Video Visit: Ja  Signed Electronically by: Jaime Navarrete MD

## 2025-05-29 NOTE — TELEPHONE ENCOUNTER
[phone]         Called wife and relayed below message. Gave phone number with referral and stated to reach out if worsening or further questions and concerns.    Patient verbalized understanding and all questions answered.     Patricia Frederick RN  [facility] - Registered Nurse  Clinic Triage [place]   May 29, 2025

## 2025-06-04 ENCOUNTER — THERAPY VISIT (OUTPATIENT)
Dept: PHYSICAL THERAPY | Facility: CLINIC | Age: 70
End: 2025-06-04
Attending: ORTHOPAEDIC SURGERY
Payer: COMMERCIAL

## 2025-06-04 DIAGNOSIS — M25.551 CHRONIC HIP PAIN AFTER TOTAL REPLACEMENT OF RIGHT HIP JOINT: Primary | ICD-10-CM

## 2025-06-04 DIAGNOSIS — Z96.641 CHRONIC HIP PAIN AFTER TOTAL REPLACEMENT OF RIGHT HIP JOINT: Primary | ICD-10-CM

## 2025-06-04 DIAGNOSIS — G89.29 CHRONIC HIP PAIN AFTER TOTAL REPLACEMENT OF RIGHT HIP JOINT: Primary | ICD-10-CM

## 2025-06-04 PROCEDURE — 97110 THERAPEUTIC EXERCISES: CPT | Mod: GP | Performed by: PHYSICAL THERAPIST

## 2025-06-11 ENCOUNTER — OFFICE VISIT (OUTPATIENT)
Dept: PODIATRY | Facility: CLINIC | Age: 70
End: 2025-06-11
Payer: COMMERCIAL

## 2025-06-11 VITALS — BODY MASS INDEX: 35.52 KG/M2 | WEIGHT: 221 LBS | HEIGHT: 66 IN

## 2025-06-11 DIAGNOSIS — M72.2 PLANTAR FASCIITIS OF LEFT FOOT: Primary | ICD-10-CM

## 2025-06-11 DIAGNOSIS — L60.8 PINCER NAIL DEFORMITY: ICD-10-CM

## 2025-06-11 PROCEDURE — 99203 OFFICE O/P NEW LOW 30 MIN: CPT | Performed by: PODIATRIST

## 2025-06-11 PROCEDURE — 1125F AMNT PAIN NOTED PAIN PRSNT: CPT | Performed by: PODIATRIST

## 2025-06-11 ASSESSMENT — PAIN SCALES - GENERAL: PAINLEVEL_OUTOF10: MILD PAIN (1)

## 2025-06-11 NOTE — PROGRESS NOTES
HPI:  Aníbal Goldstein is a 69 year old male who is seen in consultation at the request of self.    Pt presents for eval of:   (Onset, Location, L/R, Character, Treatments, Injury if yes)     Onset left and right toenails are curving inwards.    Ongoing for 1 year, plantar left heel pain. No injury noted. Worse w/o shoes.    Works as a .      ROS: 10 point ROS neg other than the symptoms noted above in the HPI.    Patient Active Problem List   Diagnosis    Transient visual loss, right    Mixed hyperlipidemia    Benign essential hypertension    Prediabetes    Status post total replacement of left hip    Abnormal gait    Class 2 severe obesity due to excess calories with serious comorbidity in adult (H)    Chronic hip pain after total replacement of right hip joint       PAST MEDICAL HISTORY:   Past Medical History:   Diagnosis Date    Cervicalgia 01/2008    Hypertension 10-6-22    Pain in joint, shoulder region 01/2008     PAST SURGICAL HISTORY:   Past Surgical History:   Procedure Laterality Date    ABDOMEN SURGERY  1991    Hernia repair    ARTHROPLASTY HIP Left 11/29/2023    Procedure: Left total hip arthroplasty;  Surgeon: Jac Man MD;  [facility]    ARTHROPLASTY HIP Right 2/12/2024    Procedure: ARTHROPLASTY, HIP, TOTAL RIGHT;  Surgeon: Jac Man MD;  Location: [facility] OR    COLONOSCOPY      COLONOSCOPY WITH CO2 INSUFFLATION N/A 05/17/2021    Procedure: COLONOSCOPY, WITH CO2 INSUFFLATION;  Surgeon: Elder Bates DO;  Location: [facility]    HC KNEE SCOPE,MED/LAT MENISECTOMY  02/09/2009    Right knee    HERNIA REPAIR, INGUINAL RT/LT      lt ?    RELEASE CARPAL TUNNEL Right 03/01/2018    Procedure: RELEASE CARPAL TUNNEL;  Right carpal tunnel release;  Surgeon: Britney Delgado MD;  Location: [facility]    ROTATOR CUFF REPAIR RT/LT  02/18/2010    Right side. Done at [facility].     MEDICATIONS:   Current Outpatient Medications:     acetaminophen (TYLENOL) 325 MG tablet,  Take 2 tablets (650 mg) by mouth every 4 hours as needed for other (mild pain), Disp: 100 tablet, Rfl: 0    acetaminophen (TYLENOL) 500 MG tablet, Take 1-2 tablets (500-1,000 mg) by mouth every 6 hours as needed for mild pain, Disp: , Rfl:     atorvastatin (LIPITOR) 40 MG tablet, Take 1 tablet (40 mg) by mouth every morning., Disp: 90 tablet, Rfl: 3    lisinopril (ZESTRIL) 10 MG tablet, Take 0.5 tablets (5 mg) by mouth daily., Disp: 90 tablet, Rfl: 3    aspirin 81 MG EC tablet, Take 1 tablet (81 mg) by mouth 2 times daily (Patient not taking: Reported on 6/11/2025), Disp: 60 tablet, Rfl: 0  ALLERGIES:    Allergies   Allergen Reactions    No Known Allergies      SOCIAL HISTORY:   Social History     Socioeconomic History    Marital status:      Spouse name: Not on file    Number of children: Not on file    Years of education: Not on file    Highest education level: Not on file   Occupational History    Occupation:      Employer: SELF EMPLOYED.   Tobacco Use    Smoking status: Never    Smokeless tobacco: Never   Vaping Use    Vaping status: Never Used   Substance and Sexual Activity    Alcohol use: Not Currently     Alcohol/week: 1.7 standard drinks of alcohol     Types: 2 Standard drinks or equivalent per week     Comment: 0-1 per week     Drug use: Never    Sexual activity: Not Currently     Partners: Female   Other Topics Concern    Parent/sibling w/ CABG, MI or angioplasty before 65F 55M? No   Social History Narrative    Lives with spouse.     Social Drivers of Health     Financial Resource Strain: Low Risk  (12/12/2024)    Financial Resource Strain     Within the past 12 months, have you or your family members you live with been unable to get utilities (heat, electricity) when it was really needed?: No   Food Insecurity: Low Risk  (12/12/2024)    Food Insecurity     Within the past 12 months, did you worry that your food would run out before you got money to buy more?: No     Within the past 12  months, did the food you bought just not last and you didn t have money to get more?: No   Transportation Needs: Low Risk  (12/12/2024)    Transportation Needs     Within the past 12 months, has lack of transportation kept you from medical appointments, getting your medicines, non-medical meetings or appointments, work, or from getting things that you need?: No   Physical Activity: Insufficiently Active (12/12/2024)    Exercise Vital Sign     Days of Exercise per Week: 3 days     Minutes of Exercise per Session: 40 min   Stress: No Stress Concern Present (12/12/2024)    [place] [place] [employer] - Occupational Stress Questionnaire     Feeling of Stress : Not at all   Social Connections: Unknown (12/12/2024)    Social Connection and Isolation Panel [NHANES]     Frequency of Communication with Friends and Family: Not on file     Frequency of Social Gatherings with Friends and Family: Twice a week     Attends Confucianism Services: Not on file     Active Member of Clubs or Organizations: Not on file     Attends Club or Organization Meetings: Not on file     Marital Status: Not on file   Interpersonal Safety: Low Risk  (12/12/2024)    Interpersonal Safety     Do you feel physically and emotionally safe where you currently live?: Yes     Within the past 12 months, have you been hit, slapped, kicked or otherwise physically hurt by someone?: No     Within the past 12 months, have you been humiliated or emotionally abused in other ways by your partner or ex-partner?: No   Housing Stability: Low Risk  (12/12/2024)    Housing Stability     Do you have housing? : Yes     Are you worried about losing your housing?: No     FAMILY HISTORY:   Family History   Problem Relation Age of Onset    Alzheimer Disease Mother     Colon Cancer Mother     Diabetes Father         Adult onset    Alzheimer Disease Father     Colon Cancer Maternal Grandmother     Prostate Cancer No family hx of     Breast Cancer No family hx of      "Ulcerative Colitis No family hx of     Crohn's Disease No family hx of        EXAM:Vitals: Ht 1.687 m (5' 6.4\")   Wt 100.2 kg (221 lb)   BMI 35.24 kg/m    BMI= Body mass index is 35.24 kg/m .    General appearance: Patient is alert and fully cooperative with history & exam.  No sign of distress is noted during the visit.     Psychiatric: Affect is pleasant & appropriate.  Patient appears motivated to improve health.     Respiratory: Breathing is regular & unlabored while sitting.     HEENT: Hearing is intact to spoken word.  Speech is clear.  No gross evidence of visual impairment that would impact ambulation.     Vascular: DP & PT 2/4 & regular bilaterally.  No significant edema, rubor or varicosities noted.  CFT and skin temperature is normal to both lower extremities.       Neurologic: Lower extremity sensation is intact to light touch.  No evidence of weakness in the lower extremities.  No evidence of neuropathy and negative tinel sign.     Dermatologic: Skin is intact to both lower extremities without significant lesions, rash or abrasion.  Normal texture turgor and tone. No paronychia or evidence of soft tissue infection is noted.  Several nails are forming pincer nails about the hallux and second toes bilateral.  No acute edema bleeding or abscess noted.    Musculoskeletal: Patient is ambulatory without assistive device or brace.  Discomfort is noted with firm palpation along the medial band of the plantar fascia left foot most notably at the origination upon the calcaneus not through the arch.  No pain with compression of the calcaneus medial to lateral or with palpation of the achilles, peroneal or posterior tibial tendons.  Slightly more than 0  of ankle joint dorsiflexion without crepitus or pain throughout the ankle, subtalar or midtarsal joints.  No pain or limitations throughout manual muscle strength testing plus 5/5 to all four quadrants bilateral.  No palpable edema noted.      Radiographs:  " Deferred.     ASSESSMENT:       ICD-10-CM    1. Plantar fasciitis of left foot  M72.2 Orthotics, Mastectomy and Custom Compression Orders Type: Orthotic; Orthotic Type Requested: Foot Orthotics; Foot Orthotics Laterality: Bilateral      2. Pincer nail deformity  L60.8           PLAN:  Reviewed patient's chart in [facility] and discussed etiology and treatment options.      Treatments:  6/11/2025  Discussed appropriate debridement technique regarding the nails.  Discussed and dispensed written instructions for assistance with this.  Also discussed permanent matrixectomy and written instructions dispensed.  Follow-up for matrixectomy if it remains symptomatic.    Second problem addressed was plantar fasciitis bilateral.  It is off-and-on at this point therefore recommend changes in shoe gear.  Discontinue barefoot walking or unsupported walking in shoes without shank.  Dispensed written instructions to obtain appropriate shoe gear and/or OTC inserts.    May consider night splint physical therapy oral anti-inflammatories if it became consistently painful or he fails changes in shoe gear.  Prescription for custom molded orthotics 6/11/2025  Follow up in 4-5 weeks if still symptomatic.      Garcia Melgar DPM

## 2025-06-11 NOTE — LETTER
6/11/2025      Aníbal Goldstein  [address] [place] [facility] [id number]      Dear Colleague,    Thank you for referring your patient, Aníbal Goldstein, to the [facility]. Please see a copy of my visit note below.    HPI:  Aníbal Goldstein is a 69 year old male who is seen in consultation at the request of self.    Pt presents for eval of:   (Onset, Location, L/R, Character, Treatments, Injury if yes)     Onset left and right toenails are curving inwards.    Ongoing for 1 year, plantar left heel pain. No injury noted. Worse w/o shoes.    Works as a .      ROS: 10 point ROS neg other than the symptoms noted above in the HPI.    Patient Active Problem List   Diagnosis     Transient visual loss, right     Mixed hyperlipidemia     Benign essential hypertension     Prediabetes     Status post total replacement of left hip     Abnormal gait     Class 2 severe obesity due to excess calories with serious comorbidity in adult (H)     Chronic hip pain after total replacement of right hip joint       PAST MEDICAL HISTORY:   Past Medical History:   Diagnosis Date     Cervicalgia 01/2008     Hypertension 10-6-22     Pain in joint, shoulder region 01/2008     PAST SURGICAL HISTORY:   Past Surgical History:   Procedure Laterality Date     ABDOMEN SURGERY  1991    Hernia repair     ARTHROPLASTY HIP Left 11/29/2023    Procedure: Left total hip arthroplasty;  Surgeon: Jac Man MD;  [facility]     ARTHROPLASTY HIP Right 2/12/2024    Procedure: ARTHROPLASTY, HIP, TOTAL RIGHT;  Surgeon: Jac Man MD;  Location: [facility] OR     COLONOSCOPY       COLONOSCOPY WITH CO2 INSUFFLATION N/A 05/17/2021    Procedure: COLONOSCOPY, WITH CO2 INSUFFLATION;  Surgeon: Elder Bates DO;  Location: [facility]     HC KNEE SCOPE,MED/LAT MENISECTOMY  02/09/2009    Right knee     HERNIA REPAIR, INGUINAL RT/LT      lt ?     RELEASE CARPAL TUNNEL Right 03/01/2018    Procedure: RELEASE CARPAL TUNNEL;   Right carpal tunnel release;  Surgeon: Britney Delgado MD;  Location: [facility] OR     ROTATOR CUFF REPAIR RT/LT  02/18/2010    Right side. Done at [facility].     MEDICATIONS:   Current Outpatient Medications:      acetaminophen (TYLENOL) 325 MG tablet, Take 2 tablets (650 mg) by mouth every 4 hours as needed for other (mild pain), Disp: 100 tablet, Rfl: 0     acetaminophen (TYLENOL) 500 MG tablet, Take 1-2 tablets (500-1,000 mg) by mouth every 6 hours as needed for mild pain, Disp: , Rfl:      atorvastatin (LIPITOR) 40 MG tablet, Take 1 tablet (40 mg) by mouth every morning., Disp: 90 tablet, Rfl: 3     lisinopril (ZESTRIL) 10 MG tablet, Take 0.5 tablets (5 mg) by mouth daily., Disp: 90 tablet, Rfl: 3     aspirin 81 MG EC tablet, Take 1 tablet (81 mg) by mouth 2 times daily (Patient not taking: Reported on 6/11/2025), Disp: 60 tablet, Rfl: 0  ALLERGIES:    Allergies   Allergen Reactions     No Known Allergies      SOCIAL HISTORY:   Social History     Socioeconomic History     Marital status:      Spouse name: Not on file     Number of children: Not on file     Years of education: Not on file     Highest education level: Not on file   Occupational History     Occupation:      Employer: SELF EMPLOYED.   Tobacco Use     Smoking status: Never     Smokeless tobacco: Never   Vaping Use     Vaping status: Never Used   Substance and Sexual Activity     Alcohol use: Not Currently     Alcohol/week: 1.7 standard drinks of alcohol     Types: 2 Standard drinks or equivalent per week     Comment: 0-1 per week      Drug use: Never     Sexual activity: Not Currently     Partners: Female   Other Topics Concern     Parent/sibling w/ CABG, MI or angioplasty before 65F 55M? No   Social History Narrative    Lives with spouse.     Social Drivers of Health     Financial Resource Strain: Low Risk  (12/12/2024)    Financial Resource Strain      Within the past 12 months, have you or your family members you  live with been unable to get utilities (heat, electricity) when it was really needed?: No   Food Insecurity: Low Risk  (12/12/2024)    Food Insecurity      Within the past 12 months, did you worry that your food would run out before you got money to buy more?: No      Within the past 12 months, did the food you bought just not last and you didn t have money to get more?: No   Transportation Needs: Low Risk  (12/12/2024)    Transportation Needs      Within the past 12 months, has lack of transportation kept you from medical appointments, getting your medicines, non-medical meetings or appointments, work, or from getting things that you need?: No   Physical Activity: Insufficiently Active (12/12/2024)    Exercise Vital Sign      Days of Exercise per Week: 3 days      Minutes of Exercise per Session: 40 min   Stress: No Stress Concern Present (12/12/2024)    [employer] [place] [employer] - Occupational Stress Questionnaire      Feeling of Stress : Not at all   Social Connections: Unknown (12/12/2024)    Social Connection and Isolation Panel [NHANES]      Frequency of Communication with Friends and Family: Not on file      Frequency of Social Gatherings with Friends and Family: Twice a week      Attends Oriental orthodox Services: Not on file      Active Member of Clubs or Organizations: Not on file      Attends Club or Organization Meetings: Not on file      Marital Status: Not on file   Interpersonal Safety: Low Risk  (12/12/2024)    Interpersonal Safety      Do you feel physically and emotionally safe where you currently live?: Yes      Within the past 12 months, have you been hit, slapped, kicked or otherwise physically hurt by someone?: No      Within the past 12 months, have you been humiliated or emotionally abused in other ways by your partner or ex-partner?: No   Housing Stability: Low Risk  (12/12/2024)    Housing Stability      Do you have housing? : Yes      Are you worried about losing your housing?: No  "    FAMILY HISTORY:   Family History   Problem Relation Age of Onset     Alzheimer Disease Mother      Colon Cancer Mother      Diabetes Father         Adult onset     Alzheimer Disease Father      Colon Cancer Maternal Grandmother      Prostate Cancer No family hx of      Breast Cancer No family hx of      Ulcerative Colitis No family hx of      Crohn's Disease No family hx of        EXAM:Vitals: Ht 1.687 m (5' 6.4\")   Wt 100.2 kg (221 lb)   BMI 35.24 kg/m    BMI= Body mass index is 35.24 kg/m .    General appearance: Patient is alert and fully cooperative with history & exam.  No sign of distress is noted during the visit.     Psychiatric: Affect is pleasant & appropriate.  Patient appears motivated to improve health.     Respiratory: Breathing is regular & unlabored while sitting.     HEENT: Hearing is intact to spoken word.  Speech is clear.  No gross evidence of visual impairment that would impact ambulation.     Vascular: DP & PT 2/4 & regular bilaterally.  No significant edema, rubor or varicosities noted.  CFT and skin temperature is normal to both lower extremities.       Neurologic: Lower extremity sensation is intact to light touch.  No evidence of weakness in the lower extremities.  No evidence of neuropathy and negative tinel sign.     Dermatologic: Skin is intact to both lower extremities without significant lesions, rash or abrasion.  Normal texture turgor and tone. No paronychia or evidence of soft tissue infection is noted.  Several nails are forming pincer nails about the hallux and second toes bilateral.  No acute edema bleeding or abscess noted.    Musculoskeletal: Patient is ambulatory without assistive device or brace.  Discomfort is noted with firm palpation along the medial band of the plantar fascia left foot most notably at the origination upon the calcaneus not through the arch.  No pain with compression of the calcaneus medial to lateral or with palpation of the achilles, peroneal or " posterior tibial tendons.  Slightly more than 0  of ankle joint dorsiflexion without crepitus or pain throughout the ankle, subtalar or midtarsal joints.  No pain or limitations throughout manual muscle strength testing plus 5/5 to all four quadrants bilateral.  No palpable edema noted.      Radiographs:  Deferred.     ASSESSMENT:       ICD-10-CM    1. Plantar fasciitis of left foot  M72.2 Orthotics, Mastectomy and Custom Compression Orders Type: Orthotic; Orthotic Type Requested: Foot Orthotics; Foot Orthotics Laterality: Bilateral      2. Pincer nail deformity  L60.8           PLAN:  [facility] and discussed etiology and treatment options.      Treatments:  6/11/2025  Discussed appropriate debridement technique regarding the nails.  Discussed and dispensed written instructions for assistance with this.  Also discussed permanent matrixectomy and written instructions dispensed.  Follow-up for matrixectomy if it remains symptomatic.    Second problem addressed was plantar fasciitis bilateral.  It is off-and-on at this point therefore recommend changes in shoe gear.  Discontinue barefoot walking or unsupported walking in shoes without shank.  Dispensed written instructions to obtain appropriate shoe gear and/or OTC inserts.    May consider night splint physical therapy oral anti-inflammatories if it became consistently painful or he fails changes in shoe gear.  Prescription for custom molded orthotics 6/11/2025  Follow up in 4-5 weeks if still symptomatic.      Garcia Melgar DPM      Again, thank you for allowing me to participate in the care of your patient.        Sincerely,        Garcia Melgar DPM    Electronically signed

## 2025-06-11 NOTE — PATIENT INSTRUCTIONS
"Nail Debridement    A high quality instrument makes trimming toenails MUCH easier.  Search [employer] for any 5\" nail nipper manufactured by reliable brands such as Miltex, Integra or Jarit as these quality instruments will help manage difficult nails more effectively and comfortably. We use Miltex -SS.  A physician is not necessary to trim nails even if you are taking blood thinners or are diabetic.  Your family or care givers may help manage your toenails.      Trim or sand the nails once weekly.  Do not wait until they are long and painful or trimming will become too difficult and painful and will increase your risk of complications or infection.  A course file or 120 grit sandpaper on a sanding block can be helpful.  For very thick nails many people prefer battery operated abebe such as an Amope', Personal Pedi and Emjoi for regular use or heavy painful callouses or thick toenails.    Trim or skive any portion of nail that is thick, loose, crumbling, or not well attached. Do not tear the nail away, but rather cut them with a nail nipper or sand or sand them down.  You may follow up with your Podiatric Physician if you have pain, bleeding, infection, questions or other concerns.      You may also contact the following Registered Nurses for further help with nail debridement and minor hygiene concnerns.  They may come to your home or meet them at their clinic to trim your toenails and soak your feet, as well as monitor for any complications that would require evaluation by a Physician.      [facility]  Sosa Arellano RN  Phone & text [phone]    [facility]  Agnes Manzano RN  Office [phone]    For up to date list and to find foot care nurses in other communities visit [employer] website:  afcna.org.     Calluses, Corns, IPKs, Porokeratosis    When there is excessive friction or pressure on the skin, the body responds by making the skin thicker. "  While this may protect the deeper structures, the thickened skin can take up more space and thus increase pressure over a bony prominence or become an open sore or skin ulcer as this skin becomes less flexible.    Flat, diffuse thickening are simple calluses and they are usually caused by friction.  Often these are the result of rubbing on a shoe or going barefoot.    Calluses with a central core between the toes are called corns.  These often result from prominent joints on adjacent toes rubbing together.  Theses are often a symptom of bone malalignment and will usually recur unless the underlying bones are addressed.    Many of these lesions can be kept comfortable with routine maintenance. This consists of filing them with a Ped Egg, callus file, or 120 grit sandpaper on a block, every day during your bath or shower.  Most people prefer battery operated abebe such as an Amope', Personal Pedi and Emjoi for regular use or heavy painful callouses.  Heavy creams or ointments can be applied 1-2 times every day to keep them soft. Toe spacers can be used for corns, gel pads can be used for other lesions on the bottom of the foot. If there is a deformity noted, such as a prominent bone, often this can be addressed to minimize recurrence. However, sometimes the pressure and lesion simply migrates to another spot after surgery, so it is not a guaranteed cure.     If you have heavy calluses, you may apply heavier cream that you scoop up such as Cetaphil cream, or Eucerin cream.  Be sure to obtain cream and not lotion.  Lotion is water based, dispenses through a pump, and not durable enough for feet). If your skin is cracked you may even select ointment such as Aquaphor or Vaseline. For more aggressive short term help apply heavy creams or ointment under occlusive dressings such as Saran Wrap or Jelly Feet while sleeping.   Jelly Feet can be obtained at [email or web address].[employer].com.     To be successful with managing hyperkeratotic  skin, you must manage hygiene daily.  Performing this hygiene once a month or sometimes even once a week may not be enough to stay ahead of it.  At your bath or shower time is the easiest time to work on this.  There is no medical or surgical treatment that will absolutely eliminate many of these and symptoms.    [employer] is a reliable source for all sorts of foot pads, cushions, or interdigital spacers and foot appliances. Go to www.[employer] or request a catalog at 4-667HelpAround.         .INGROWN TOENAIL POSTOPERATIVE INSTRUCTIONS   (Nail avulsion or chemical matrixectomy)   1.  Go directly home and elevate the affected foot on one or two pillows for the remainder of the day & evening. Your toe may stay numb for 2-8 hours.   2.  Take Tylenol, ibuprofen or another anti-inflammatory as needed for pain. Most people require no pain medication.  3.  Use oral antibiotic if that was prescribed at your doctor visit. Take the entire prescription even if your symptoms have improved.   4.  Keep dressing dry and intact the day of the procedure. The morning after the procedure, remove entire dressing and soak or wash the affected area in lukewarm water for 5-10 minutes.  You may add Epsom salt to soothe the area and help it become drier. Do this twice a day or more until the surgical site remains dry without drainage.  This may take 1-2 weeks if a small part of your nail was removed or 4-8 weeks if the entire nail was removed. You may count showering or bathing as one soak.  After each soak, pat the area dry with a clean towel or gauze and then allow to air dry for a few minutes. Then cover with a cloth or fabric bandaid.  Avoid ointments as they keep the area to wet. Encourage this wound to become dry with a scab.   5.  You may walk and pursue everyday activities as tolerated with either an open toe shoe or cut-out shoe as needed. You may wear regular roomy shoes if no pain is noted.  No swimming in the lake, river, pool or  hot tub until the wound has become dry.   7.  Watch for any signs or symptoms of infection such as: red streaks going up the foot/leg, swelling, pus or foul odor. For patients that have had a permanent phenol procedure, the toe will drain longer and may look red or sore for a half an inch around the wound similar to infection because it is a chemical burn.  Please call with questions.  8.  You may call my office in 1-2 weeks if the surgical site is not becoming drier or if other complications occur.   9.  There is 5-10% chance of complications such as infection or formation of another nail or a thick scared nail.         PLANTAR FASCIITIS  The  plantar fascia  is a thick fibrous layer of tissue that covers the bones on the bottom of your foot. It supports the foot bones in an arched position.  Plantar fasciitis  is a painful inflammation of the plantar fascia due to overuse. This can develop gradually or suddenly. It usually affects one foot at a time but can affect both feet. Heel pain can be sharp and feel like a knife sticking in the bottom of your foot. Pain may occur after exercising, long distance jogging, stair climbing, long periods of standing, or after getting up from a seated position.  Risk factors include arthritis, diabetes, obesity or recent weight gain, flat-foot, high arch, wearing high heels or loose shoes or shoes with poor arch support.  Sudden changes in activity or shoe gear may contribute to symptoms.  Foot pain from this condition is usually worse in the morning and improves with walking. By the end of the day there may be a dull aching. Treatment requires improved support of feet, short-term rest and controlling inflammation. It may take up to nine months before all symptoms go away with the measures described below.  A steroid injection into the foot, or surgery may be needed if this is becomes long standing or severe.  HOME CARE  If you are overweight, lose weight to promote  healing.  Choose supportive shoes (stiff through the shank) with good arch support and shock absorbency. Replace athletic shoes when they become worn out. Don t walk or run barefoot.  Shoe inserts are an important part of treatment. You can buy off-the-shelf shoe inserts inexpensively such as Superfeet.  The best ones are custom molded to your foot with a prescription.  Night splints keep the plantar fascia gently stretched while you sleep and will eliminate morning pain. Wear it ALL NIGHT EVERY NIGHT, or any time you sit for a long time.  Reduce by 10% or more the activities that stress the feet: jogging, prolonged standing or walking, high impact sports, etc.  Stretch your feet. Gently flex your ankle by leaning into a wall or counter or drop your heel from a step.  Stretch two minutes of every hour you are awake.  Icing or massage may help heel pain. Apply an ice pack or frozen water or Coke bottle to the heel for 10-20 minutes as a preventive or after an acute flare of symptoms. You may repeat this as needed.   Follow up with your Doctor in 3 weeks as instructed.  Reliable shoe stores: To maximize your experience and provide the best possible fit.  Be sure to show them your foot concerns and tell them Dr. Melgar sent you.      Stores listed in bold have only athletic shoes, and stores that are not bold are mostly casual or variety of shoes    [employer]  [address] [place] [address]  [phone]    [employer] [facility]  [address] [place] [address]  [phone]    [employer] [facility]  [address] [place] [address]  [phone]    [employer]  [address] [facility] [address]  [phone]    [employer]  [address] [place], [place] [id number] [phone]    [employer]  [address] [place] [address]  [phone]                         [employer] Locations:     [address] [place]  [address]   [phone]     [address] [place] [address] [address] [place] [address]   [phone]     [address] [place] [address]   [phone] [address] [place] [address]   [phone]     [address] [place] [address]   [phone]     [address] [facility] [place] [address]   [phone]     [address] [facility] [address]   [place] [address]   [phone]     [address] [facility]. [address]   [place] [address]   [phone]             How to find reasonable shoes          The correct width    Correct Fitting    Correct Length      Foot Distortion    Posture Distortion                          Torsional Rigidity      Grasp behind the heel and underneath the foot and twist      Bad    Excessive torsion/twist in midfoot     Less torsion/twist in midfoot is better                   Heel Counter Rigidity      Grasp just above   midsole and squeeze      Bad    Soft heel counter      Good    Rigid Heel Counter      Flexion Rigidity      Grasp shoe and bend from forefoot to rearfoot

## 2025-06-18 ENCOUNTER — THERAPY VISIT (OUTPATIENT)
Dept: PHYSICAL THERAPY | Facility: CLINIC | Age: 70
End: 2025-06-18
Payer: COMMERCIAL

## 2025-06-18 DIAGNOSIS — G89.29 CHRONIC HIP PAIN AFTER TOTAL REPLACEMENT OF RIGHT HIP JOINT: Primary | ICD-10-CM

## 2025-06-18 DIAGNOSIS — Z96.641 CHRONIC HIP PAIN AFTER TOTAL REPLACEMENT OF RIGHT HIP JOINT: Primary | ICD-10-CM

## 2025-06-18 DIAGNOSIS — M25.551 CHRONIC HIP PAIN AFTER TOTAL REPLACEMENT OF RIGHT HIP JOINT: Primary | ICD-10-CM

## 2025-06-18 PROCEDURE — 97110 THERAPEUTIC EXERCISES: CPT | Mod: GP | Performed by: PHYSICAL THERAPIST

## 2025-07-02 ENCOUNTER — THERAPY VISIT (OUTPATIENT)
Dept: PHYSICAL THERAPY | Facility: CLINIC | Age: 70
End: 2025-07-02
Payer: COMMERCIAL

## 2025-07-02 DIAGNOSIS — G89.29 CHRONIC HIP PAIN AFTER TOTAL REPLACEMENT OF RIGHT HIP JOINT: Primary | ICD-10-CM

## 2025-07-02 DIAGNOSIS — Z96.641 CHRONIC HIP PAIN AFTER TOTAL REPLACEMENT OF RIGHT HIP JOINT: Primary | ICD-10-CM

## 2025-07-02 DIAGNOSIS — M25.551 CHRONIC HIP PAIN AFTER TOTAL REPLACEMENT OF RIGHT HIP JOINT: Primary | ICD-10-CM

## 2025-07-02 PROCEDURE — 97110 THERAPEUTIC EXERCISES: CPT | Mod: GP | Performed by: PHYSICAL THERAPIST

## 2025-08-20 ENCOUNTER — THERAPY VISIT (OUTPATIENT)
Dept: PHYSICAL THERAPY | Facility: CLINIC | Age: 70
End: 2025-08-20
Payer: COMMERCIAL

## 2025-08-20 DIAGNOSIS — M25.551 CHRONIC HIP PAIN AFTER TOTAL REPLACEMENT OF RIGHT HIP JOINT: Primary | ICD-10-CM

## 2025-08-20 DIAGNOSIS — Z96.641 CHRONIC HIP PAIN AFTER TOTAL REPLACEMENT OF RIGHT HIP JOINT: Primary | ICD-10-CM

## 2025-08-20 DIAGNOSIS — G89.29 CHRONIC HIP PAIN AFTER TOTAL REPLACEMENT OF RIGHT HIP JOINT: Primary | ICD-10-CM

## 2025-08-20 PROCEDURE — 97110 THERAPEUTIC EXERCISES: CPT | Mod: GP | Performed by: PHYSICAL THERAPIST

## 2025-08-20 ASSESSMENT — ACTIVITIES OF DAILY LIVING (ADL)
WALKING_15_MINUTES_OR_GREATER: EXTREME DIFFICULTY
SITTING_FOR_15_MINUTES: NO DIFFICULTY AT ALL
STANDING_FOR_15_MINUTES: MODERATE DIFFICULTY
ROLLING_OVER_IN_BED: SLIGHT DIFFICULTY
HOS_ADL_HIGHEST_POTENTIAL_SCORE: 64
HOS_ADL_ITEM_SCORE_TOTAL: 33
HOS_ADL_COUNT: 16
HOS_ADL_SCORE(%): 51.56
TWISTING/PIVOTING_ON_INVOLVED_LEG: MODERATE DIFFICULTY
WALKING_INITIALLY: MODERATE DIFFICULTY
PUTTING_ON_SOCKS_AND_SHOES: MODERATE DIFFICULTY
GOING_UP_1_FLIGHT_OF_STAIRS: SLIGHT DIFFICULTY
WALKING_APPROXIMATELY_10_MINUTES: MODERATE DIFFICULTY
STEPPING_UP_AND_DOWN_CURBS: SLIGHT DIFFICULTY
GETTING_INTO_AND_OUT_OF_AN_AVERAGE_CAR: MODERATE DIFFICULTY
WALKING_DOWN_STEEP_HILLS: SLIGHT DIFFICULTY
WALKING_UP_STEEP_HILLS: SLIGHT DIFFICULTY
DEEP_SQUATTING: EXTREME DIFFICULTY
LIGHT_TO_MODERATE_WORK: MODERATE DIFFICULTY
HEAVY_WORK: EXTREME DIFFICULTY
HOW_WOULD_YOU_RATE_YOUR_CURRENT_LEVEL_OF_FUNCTION_DURING_YOUR_USUAL_ACTIVITIES_OF_DAILY_LIVING_FROM_0_TO_100_WITH_100_BEING_YOUR_LEVEL_OF_FUNCTION_PRIOR_TO_YOUR_HIP_PROBLEM_AND_0_BEING_THE_INABILITY_TO_PERFORM_ANY_OF_YOUR_USUAL_DAILY_ACTIVITIES?: 20
RECREATIONAL_ACTIVITIES: EXTREME DIFFICULTY
GOING_DOWN_1_FLIGHT_OF_STAIRS: MODERATE DIFFICULTY

## (undated) DEVICE — ESU GROUND PAD ADULT W/CORD E7507

## (undated) DEVICE — LINEN ORTHO PACK 5446

## (undated) DEVICE — PACK HAND WRIST SOP15HWFSP

## (undated) DEVICE — DRSG KERLIX 4 1/2"X4YDS ROLL 6730

## (undated) DEVICE — DECANTER VIAL 2006S

## (undated) DEVICE — GOWN IMPERVIOUS SPECIALTY XLG/XLONG 32474

## (undated) DEVICE — SOL NACL 0.9% INJ 1000ML BAG 2B1324X

## (undated) DEVICE — SUCTION CANISTER MEDIVAC LINER 1500ML W/LID 65651-515

## (undated) DEVICE — SU MONOCRYL 3-0 PS-1 27" Y936H

## (undated) DEVICE — BLADE KNIFE SURG 20 371120

## (undated) DEVICE — SOL WATER IRRIG 1000ML BOTTLE 07139-09

## (undated) DEVICE — HOOD SURG T7PLUS PEEL AWAY FACE SHIELD STRL LF 0416-801-100

## (undated) DEVICE — PREP CHLORAPREP 26ML TINTED HI-LITE ORANGE 930815

## (undated) DEVICE — LINEN TOWEL PACK X5 5464

## (undated) DEVICE — GLOVE BIOGEL PI MICRO INDICATOR UNDERGLOVE SZ 8.0 48980

## (undated) DEVICE — SYR 10ML FINGER CONTROL W/O NDL 309695

## (undated) DEVICE — DRSG TEGADERM 4X10" 1627

## (undated) DEVICE — PREP CHLORAPREP 26ML TINTED ORANGE  260815

## (undated) DEVICE — GLOVE PROTEXIS BLUE W/NEU-THERA 7.0  2D73EB70

## (undated) DEVICE — SUCTION IRR SYSTEM W/O TIP INTERPULSE HANDPIECE 0210-100-000

## (undated) DEVICE — DRAPE STERI TOWEL LG 1010

## (undated) DEVICE — SOL WATER IRRIG 1000ML BOTTLE 2F7114

## (undated) DEVICE — SPONGE LAP 18X18" X8435

## (undated) DEVICE — IMP HEAD FEMORAL SNR COBALT 32MM +0 71303200
Type: IMPLANTABLE DEVICE | Site: HIP | Status: NON-FUNCTIONAL
Removed: 2024-02-12

## (undated) DEVICE — SOL NACL 0.9% IRRIG 1000ML BOTTLE 07138-09

## (undated) DEVICE — GLOVE BIOGEL PI MICRO SZ 7.5 48575

## (undated) DEVICE — BONE CLEANING TIP INTERPULSE  0210-010-000

## (undated) DEVICE — BLADE SAW RECIP STRK 70X6X0.025MM 0277-096-250S5

## (undated) DEVICE — DRSG TEGADERM ALGINATE AG 4X5" 90303

## (undated) DEVICE — SU VICRYL 0 CT 36" J358H

## (undated) DEVICE — DRAPE STERI TOWEL SM 1000

## (undated) DEVICE — SU ETHILON 4-0 FS-2 18" 662H

## (undated) DEVICE — ESU PENCIL SMOKE EVAC W/ROCKER SWITCH 0703-047-000

## (undated) DEVICE — GLOVE BIOGEL PI MICRO INDICATOR UNDERGLOVE SZ 6.5 48965

## (undated) DEVICE — GLOVE PROTEXIS W/NEU-THERA 6.5  2D73TE65

## (undated) DEVICE — CAST PLASTER SPLINT 3X15" EXTRA FAST

## (undated) DEVICE — SOL NACL 0.9% IRRIG 500ML BOTTLE 2F7123

## (undated) DEVICE — SU DERMABOND ADVANCED .7ML DNX12

## (undated) DEVICE — DRSG STERI STRIP 1/2X4" R1547

## (undated) DEVICE — GOWN XLG DISP 9545

## (undated) DEVICE — DRAPE IOBAN INCISE 36X23" 6651EZ

## (undated) DEVICE — DRILL BIT FLEXIBLE REFLECTION 35MM 71362935

## (undated) DEVICE — SU VICRYL 2-0 CT-1 27" UND J259H

## (undated) DEVICE — IMM PILLOW ABDUCT HIP MED 31143061

## (undated) DEVICE — PACK TOTAL HIP W/POUCH RIVERSIDE LATEX FREE

## (undated) DEVICE — BNDG ELASTIC 3"X5YDS UNSTERILE 6611-30

## (undated) DEVICE — GLOVE BIOGEL PI MICRO SZ 8.0 48580

## (undated) DEVICE — STRAP STIRRUP W/SLIP 30187-030

## (undated) DEVICE — ESU PENCIL W/SMOKE EVAC NEPTUNE STRYKER 0703-046-000

## (undated) DEVICE — SU ETHIBOND 5 V-37 4X30" MB66G

## (undated) DEVICE — NDL 25GA 1.5" 305127

## (undated) DEVICE — DEVICE RETRIEVER HEWSON 71111579

## (undated) DEVICE — GLOVE BIOGEL PI MICRO SZ 6.0 48560

## (undated) DEVICE — HOOD FLYTE W/PEELAWAY 408-800-100

## (undated) DEVICE — LINEN DRAPE 54X72" 5467

## (undated) DEVICE — STRAP KNEE/BODY 31143004

## (undated) DEVICE — SUCTION MANIFOLD NEPTUNE 2 SYS 4 PORT 0702-020-000

## (undated) DEVICE — GLOVE BIOGEL PI SZ 7.5 40875

## (undated) DEVICE — POSITIONER ABDUCTION PILLOW FOAM MED FP-ABDUCTM

## (undated) DEVICE — LINEN MAYO STAND COVER OVERSIZE PACK 5458

## (undated) DEVICE — SOL NACL 0.9% IRRIG 1000ML BOTTLE 2F7124

## (undated) RX ORDER — GLYCOPYRROLATE 0.2 MG/ML
INJECTION INTRAMUSCULAR; INTRAVENOUS
Status: DISPENSED
Start: 2024-02-12

## (undated) RX ORDER — FENTANYL CITRATE 50 UG/ML
INJECTION, SOLUTION INTRAMUSCULAR; INTRAVENOUS
Status: DISPENSED
Start: 2021-05-17

## (undated) RX ORDER — ONDANSETRON 2 MG/ML
INJECTION INTRAMUSCULAR; INTRAVENOUS
Status: DISPENSED
Start: 2024-02-12

## (undated) RX ORDER — HYDROMORPHONE HYDROCHLORIDE 1 MG/ML
INJECTION, SOLUTION INTRAMUSCULAR; INTRAVENOUS; SUBCUTANEOUS
Status: DISPENSED
Start: 2023-11-29

## (undated) RX ORDER — TRANEXAMIC ACID 650 MG/1
TABLET ORAL
Status: DISPENSED
Start: 2023-11-29

## (undated) RX ORDER — TRANEXAMIC ACID 100 MG/ML
INJECTION, SOLUTION INTRAVENOUS
Status: DISPENSED
Start: 2024-02-12

## (undated) RX ORDER — FENTANYL CITRATE 50 UG/ML
INJECTION, SOLUTION INTRAMUSCULAR; INTRAVENOUS
Status: DISPENSED
Start: 2024-02-12

## (undated) RX ORDER — ACETAMINOPHEN 325 MG/1
TABLET ORAL
Status: DISPENSED
Start: 2023-11-29

## (undated) RX ORDER — EPHEDRINE SULFATE 50 MG/ML
INJECTION, SOLUTION INTRAMUSCULAR; INTRAVENOUS; SUBCUTANEOUS
Status: DISPENSED
Start: 2024-02-12

## (undated) RX ORDER — HYDROMORPHONE HYDROCHLORIDE 1 MG/ML
INJECTION, SOLUTION INTRAMUSCULAR; INTRAVENOUS; SUBCUTANEOUS
Status: DISPENSED
Start: 2024-02-12

## (undated) RX ORDER — ACETAMINOPHEN 325 MG/1
TABLET ORAL
Status: DISPENSED
Start: 2024-02-12

## (undated) RX ORDER — CEFAZOLIN SODIUM/WATER 2 G/20 ML
SYRINGE (ML) INTRAVENOUS
Status: DISPENSED
Start: 2023-11-29

## (undated) RX ORDER — OXYCODONE HYDROCHLORIDE 5 MG/1
TABLET ORAL
Status: DISPENSED
Start: 2023-11-29

## (undated) RX ORDER — DEXAMETHASONE SODIUM PHOSPHATE 4 MG/ML
INJECTION, SOLUTION INTRA-ARTICULAR; INTRALESIONAL; INTRAMUSCULAR; INTRAVENOUS; SOFT TISSUE
Status: DISPENSED
Start: 2024-02-12

## (undated) RX ORDER — SIMETHICONE 40MG/0.6ML
SUSPENSION, DROPS(FINAL DOSAGE FORM)(ML) ORAL
Status: DISPENSED
Start: 2021-05-17

## (undated) RX ORDER — PROPOFOL 10 MG/ML
INJECTION, EMULSION INTRAVENOUS
Status: DISPENSED
Start: 2024-02-12

## (undated) RX ORDER — CEFAZOLIN SODIUM 2 G/50ML
SOLUTION INTRAVENOUS
Status: DISPENSED
Start: 2024-02-12

## (undated) RX ORDER — FENTANYL CITRATE-0.9 % NACL/PF 10 MCG/ML
PLASTIC BAG, INJECTION (ML) INTRAVENOUS
Status: DISPENSED
Start: 2024-02-12

## (undated) RX ORDER — FENTANYL CITRATE 50 UG/ML
INJECTION, SOLUTION INTRAMUSCULAR; INTRAVENOUS
Status: DISPENSED
Start: 2023-11-29

## (undated) RX ORDER — OXYCODONE HYDROCHLORIDE 5 MG/1
TABLET ORAL
Status: DISPENSED
Start: 2024-02-12